# Patient Record
Sex: FEMALE | Race: WHITE | NOT HISPANIC OR LATINO
[De-identification: names, ages, dates, MRNs, and addresses within clinical notes are randomized per-mention and may not be internally consistent; named-entity substitution may affect disease eponyms.]

---

## 2017-01-12 ENCOUNTER — APPOINTMENT (OUTPATIENT)
Dept: INTERNAL MEDICINE | Facility: CLINIC | Age: 82
End: 2017-01-12

## 2017-01-12 VITALS — DIASTOLIC BLOOD PRESSURE: 68 MMHG | RESPIRATION RATE: 14 BRPM | SYSTOLIC BLOOD PRESSURE: 118 MMHG | HEART RATE: 68 BPM

## 2017-01-12 VITALS — WEIGHT: 175 LBS | HEIGHT: 68 IN | BODY MASS INDEX: 26.52 KG/M2

## 2017-01-16 ENCOUNTER — LABORATORY RESULT (OUTPATIENT)
Age: 82
End: 2017-01-16

## 2017-01-19 ENCOUNTER — APPOINTMENT (OUTPATIENT)
Dept: CARDIOLOGY | Facility: CLINIC | Age: 82
End: 2017-01-19

## 2017-01-19 VITALS
HEIGHT: 68 IN | WEIGHT: 174 LBS | OXYGEN SATURATION: 98 % | HEART RATE: 75 BPM | BODY MASS INDEX: 26.37 KG/M2 | DIASTOLIC BLOOD PRESSURE: 69 MMHG | RESPIRATION RATE: 15 BRPM | SYSTOLIC BLOOD PRESSURE: 105 MMHG

## 2017-01-19 LAB
25(OH)D3 SERPL-MCNC: 37.1 NG/ML
ALBUMIN SERPL ELPH-MCNC: 3.9 G/DL
ALP BLD-CCNC: 85 U/L
ALT SERPL-CCNC: 26 U/L
ANION GAP SERPL CALC-SCNC: 15 MMOL/L
APPEARANCE: CLEAR
AST SERPL-CCNC: 26 U/L
BASOPHILS # BLD AUTO: 0.06 K/UL
BASOPHILS NFR BLD AUTO: 0.7 %
BILIRUB SERPL-MCNC: 0.6 MG/DL
BILIRUBIN URINE: NEGATIVE
BLOOD URINE: NEGATIVE
BUN SERPL-MCNC: 33 MG/DL
CALCIUM SERPL-MCNC: 10.1 MG/DL
CHLORIDE SERPL-SCNC: 106 MMOL/L
CHOLEST SERPL-MCNC: 141 MG/DL
CHOLEST/HDLC SERPL: 2.3 RATIO
CO2 SERPL-SCNC: 24 MMOL/L
COLOR: YELLOW
CREAT SERPL-MCNC: 1.26 MG/DL
CRP SERPL HS-MCNC: 1.3 MG/L
EOSINOPHIL # BLD AUTO: 0.23 K/UL
EOSINOPHIL NFR BLD AUTO: 2.9 %
GLUCOSE BS SERPL-MCNC: 112 MG/DL
GLUCOSE QUALITATIVE U: NORMAL MG/DL
GLUCOSE SERPL-MCNC: 111 MG/DL
HBA1C MFR BLD HPLC: 6.7 %
HCT VFR BLD CALC: 34.9 %
HDLC SERPL-MCNC: 62 MG/DL
HGB BLD-MCNC: 10.3 G/DL
IMM GRANULOCYTES NFR BLD AUTO: 0.2 %
KETONES URINE: NEGATIVE
LDLC SERPL CALC-MCNC: 62 MG/DL
LEUKOCYTE ESTERASE URINE: NEGATIVE
LYMPHOCYTES # BLD AUTO: 1.54 K/UL
LYMPHOCYTES NFR BLD AUTO: 19.2 %
MAN DIFF?: NORMAL
MCHC RBC-ENTMCNC: 23.3 PG
MCHC RBC-ENTMCNC: 29.5 GM/DL
MCV RBC AUTO: 79 FL
MONOCYTES # BLD AUTO: 1.15 K/UL
MONOCYTES NFR BLD AUTO: 14.3 %
NEUTROPHILS # BLD AUTO: 5.04 K/UL
NEUTROPHILS NFR BLD AUTO: 62.7 %
NITRITE URINE: NEGATIVE
PH URINE: 6.5
PLATELET # BLD AUTO: 451 K/UL
POTASSIUM SERPL-SCNC: 4.9 MMOL/L
PROT SERPL-MCNC: 7 G/DL
PROTEIN URINE: 30 MG/DL
RBC # BLD: 4.42 M/UL
RBC # FLD: 20.1 %
SODIUM SERPL-SCNC: 145 MMOL/L
SPECIFIC GRAVITY URINE: 1.02
T4 FREE SERPL-MCNC: 1.1 NG/DL
TRIGL SERPL-MCNC: 85 MG/DL
TSH SERPL-ACNC: 2.51 UIU/ML
UROBILINOGEN URINE: 1 MG/DL
VIT B12 SERPL-MCNC: 1089 PG/ML
WBC # FLD AUTO: 8.04 K/UL

## 2017-01-20 LAB
INR PPP: 2.5 RATIO
POCT-PROTHROMBIN TIME: 28.9 SECS
QUALITY CONTROL: YES

## 2017-01-23 ENCOUNTER — LABORATORY RESULT (OUTPATIENT)
Age: 82
End: 2017-01-23

## 2017-01-24 ENCOUNTER — LABORATORY RESULT (OUTPATIENT)
Age: 82
End: 2017-01-24

## 2017-01-25 ENCOUNTER — LABORATORY RESULT (OUTPATIENT)
Age: 82
End: 2017-01-25

## 2017-01-26 ENCOUNTER — LABORATORY RESULT (OUTPATIENT)
Age: 82
End: 2017-01-26

## 2017-01-30 ENCOUNTER — APPOINTMENT (OUTPATIENT)
Dept: CARDIOLOGY | Facility: CLINIC | Age: 82
End: 2017-01-30

## 2017-01-30 VITALS
OXYGEN SATURATION: 96 % | HEART RATE: 77 BPM | SYSTOLIC BLOOD PRESSURE: 100 MMHG | DIASTOLIC BLOOD PRESSURE: 60 MMHG | RESPIRATION RATE: 16 BRPM

## 2017-01-30 LAB
INR PPP: 2.7 RATIO
POCT-PROTHROMBIN TIME: 32.3 SECS
QUALITY CONTROL: YES

## 2017-02-13 ENCOUNTER — APPOINTMENT (OUTPATIENT)
Dept: CARDIOLOGY | Facility: CLINIC | Age: 82
End: 2017-02-13

## 2017-02-13 VITALS — HEART RATE: 77 BPM | DIASTOLIC BLOOD PRESSURE: 74 MMHG | SYSTOLIC BLOOD PRESSURE: 112 MMHG | OXYGEN SATURATION: 97 %

## 2017-02-13 LAB
INR PPP: 2.6 RATIO
POCT-PROTHROMBIN TIME: 31.2 SECS
QUALITY CONTROL: YES

## 2017-02-15 ENCOUNTER — APPOINTMENT (OUTPATIENT)
Dept: HEMATOLOGY ONCOLOGY | Facility: CLINIC | Age: 82
End: 2017-02-15

## 2017-02-15 VITALS
SYSTOLIC BLOOD PRESSURE: 110 MMHG | WEIGHT: 174 LBS | BODY MASS INDEX: 26.46 KG/M2 | TEMPERATURE: 97.7 F | DIASTOLIC BLOOD PRESSURE: 62 MMHG | HEART RATE: 80 BPM

## 2017-02-15 DIAGNOSIS — Z92.89 PERSONAL HISTORY OF OTHER MEDICAL TREATMENT: ICD-10-CM

## 2017-02-15 DIAGNOSIS — Z12.4 ENCOUNTER FOR SCREENING FOR MALIGNANT NEOPLASM OF CERVIX: ICD-10-CM

## 2017-02-15 DIAGNOSIS — Z98.890 OTHER SPECIFIED POSTPROCEDURAL STATES: ICD-10-CM

## 2017-02-16 ENCOUNTER — NON-APPOINTMENT (OUTPATIENT)
Age: 82
End: 2017-02-16

## 2017-02-16 ENCOUNTER — APPOINTMENT (OUTPATIENT)
Dept: CARDIOLOGY | Facility: CLINIC | Age: 82
End: 2017-02-16

## 2017-02-16 VITALS
BODY MASS INDEX: 26.07 KG/M2 | DIASTOLIC BLOOD PRESSURE: 69 MMHG | OXYGEN SATURATION: 95 % | HEIGHT: 68 IN | HEART RATE: 68 BPM | WEIGHT: 172 LBS | RESPIRATION RATE: 16 BRPM | SYSTOLIC BLOOD PRESSURE: 110 MMHG

## 2017-02-23 ENCOUNTER — LABORATORY RESULT (OUTPATIENT)
Age: 82
End: 2017-02-23

## 2017-02-23 LAB
CREAT SERPL-MCNC: 1.27 MG/DL
FERRITIN SERPL-MCNC: 39.6 NG/ML
FOLATE SERPL-MCNC: 15.4 NG/ML
HAPTOGLOB SERPL-MCNC: <20 MG/DL
IRON SATN MFR SERPL: 11 %
IRON SERPL-MCNC: 40 UG/DL
RBC # BLD: 4.58 M/UL
RETICS # AUTO: 2.1 %
RETICS AGGREG/RBC NFR: 94.1 K/UL
TIBC SERPL-MCNC: 368 UG/DL
UIBC SERPL-MCNC: 328 UG/DL
VIT B12 SERPL-MCNC: 1228 PG/ML

## 2017-02-24 LAB
BASOPHILS # BLD AUTO: 0.05 K/UL
BASOPHILS NFR BLD AUTO: 0.6 %
DEPRECATED KAPPA LC FREE/LAMBDA SER: 1.42 RATIO
DEPRECATED KAPPA LC FREE/LAMBDA SER: 1.42 RATIO
EOSINOPHIL # BLD AUTO: 0.16 K/UL
EOSINOPHIL NFR BLD AUTO: 1.9 %
HCT VFR BLD CALC: 35.9 %
HCYS SERPL-MCNC: 13.5 UMOL/L
HGB BLD-MCNC: 10.8 G/DL
IGA SER QL IEP: 350 MG/DL
IGG SER QL IEP: 1290 MG/DL
IGM SER QL IEP: 118 MG/DL
IMM GRANULOCYTES NFR BLD AUTO: 0.2 %
KAPPA LC CSF-MCNC: 2.78 MG/DL
KAPPA LC CSF-MCNC: 2.78 MG/DL
KAPPA LC SERPL-MCNC: 3.94 MG/DL
KAPPA LC SERPL-MCNC: 3.94 MG/DL
LYMPHOCYTES # BLD AUTO: 1.03 K/UL
LYMPHOCYTES NFR BLD AUTO: 12.2 %
MAN DIFF?: NORMAL
MCHC RBC-ENTMCNC: 23.6 PG
MCHC RBC-ENTMCNC: 30.1 GM/DL
MCV RBC AUTO: 78.4 FL
MONOCYTES # BLD AUTO: 0.9 K/UL
MONOCYTES NFR BLD AUTO: 10.7 %
NEUTROPHILS # BLD AUTO: 6.26 K/UL
NEUTROPHILS NFR BLD AUTO: 74.4 %
PLATELET # BLD AUTO: 437 K/UL
RBC # BLD: 4.58 M/UL
RBC # FLD: 23.1 %
WBC # FLD AUTO: 8.42 K/UL

## 2017-02-26 LAB — KAPPA LC 24H UR QL: NORMAL

## 2017-02-27 LAB
ALBUMIN MFR SERPL ELPH: 53.9 %
ALBUMIN SERPL-MCNC: 3.8 G/DL
ALBUMIN/GLOB SERPL: 1.2 RATIO
ALPHA1 GLOB MFR SERPL ELPH: 5.4 %
ALPHA1 GLOB SERPL ELPH-MCNC: 0.4 G/DL
ALPHA2 GLOB MFR SERPL ELPH: 8.8 %
ALPHA2 GLOB SERPL ELPH-MCNC: 0.6 G/DL
B-GLOBULIN MFR SERPL ELPH: 14.1 %
B-GLOBULIN SERPL ELPH-MCNC: 1 G/DL
GAMMA GLOB FLD ELPH-MCNC: 1.2 G/DL
GAMMA GLOB MFR SERPL ELPH: 17.8 %
INTERPRETATION SERPL IEP-IMP: NORMAL
M PROTEIN MFR SERPL ELPH: NORMAL %
M PROTEIN SPEC IFE-MCNC: NORMAL
MONOCLON BAND OBS SERPL: NORMAL G/DL
PROT SERPL-MCNC: 7 G/DL
PROT SERPL-MCNC: 7 G/DL

## 2017-03-02 ENCOUNTER — APPOINTMENT (OUTPATIENT)
Dept: HEMATOLOGY ONCOLOGY | Facility: CLINIC | Age: 82
End: 2017-03-02

## 2017-03-02 VITALS
DIASTOLIC BLOOD PRESSURE: 70 MMHG | SYSTOLIC BLOOD PRESSURE: 122 MMHG | HEART RATE: 76 BPM | BODY MASS INDEX: 26.46 KG/M2 | WEIGHT: 174 LBS | OXYGEN SATURATION: 96 % | TEMPERATURE: 97.4 F

## 2017-03-02 LAB — METHYLMALONATE SERPL-SCNC: 251 NMOL/L

## 2017-03-02 RX ORDER — CLINDAMYCIN HYDROCHLORIDE 150 MG/1
150 CAPSULE ORAL
Qty: 28 | Refills: 0 | Status: COMPLETED | COMMUNITY
Start: 2016-08-22

## 2017-03-06 ENCOUNTER — APPOINTMENT (OUTPATIENT)
Dept: CARDIOLOGY | Facility: CLINIC | Age: 82
End: 2017-03-06

## 2017-03-06 VITALS — SYSTOLIC BLOOD PRESSURE: 118 MMHG | HEART RATE: 68 BPM | OXYGEN SATURATION: 98 % | DIASTOLIC BLOOD PRESSURE: 74 MMHG

## 2017-03-06 LAB
INR PPP: 2.6 RATIO
POCT-PROTHROMBIN TIME: 31.7 SECS
QUALITY CONTROL: YES

## 2017-03-23 ENCOUNTER — APPOINTMENT (OUTPATIENT)
Dept: CARDIOLOGY | Facility: CLINIC | Age: 82
End: 2017-03-23

## 2017-03-23 ENCOUNTER — NON-APPOINTMENT (OUTPATIENT)
Age: 82
End: 2017-03-23

## 2017-03-23 VITALS
HEART RATE: 74 BPM | WEIGHT: 172 LBS | BODY MASS INDEX: 26.07 KG/M2 | RESPIRATION RATE: 16 BRPM | SYSTOLIC BLOOD PRESSURE: 132 MMHG | DIASTOLIC BLOOD PRESSURE: 69 MMHG | HEIGHT: 68 IN | OXYGEN SATURATION: 96 %

## 2017-03-24 ENCOUNTER — RX RENEWAL (OUTPATIENT)
Age: 82
End: 2017-03-24

## 2017-03-24 LAB
INR PPP: 2.6 RATIO
POCT-PROTHROMBIN TIME: 31.2 SECS
QUALITY CONTROL: YES

## 2017-04-02 ENCOUNTER — EMERGENCY (EMERGENCY)
Facility: HOSPITAL | Age: 82
LOS: 1 days | Discharge: HOME | End: 2017-04-02
Admitting: EMERGENCY MEDICINE
Payer: MEDICARE

## 2017-04-02 DIAGNOSIS — Z86.69 PERSONAL HISTORY OF OTHER DISEASES OF THE NERVOUS SYSTEM AND SENSE ORGANS: Chronic | ICD-10-CM

## 2017-04-02 DIAGNOSIS — Z79.82 LONG TERM (CURRENT) USE OF ASPIRIN: ICD-10-CM

## 2017-04-02 DIAGNOSIS — M84.372A STRESS FRACTURE, LEFT ANKLE, INITIAL ENCOUNTER FOR FRACTURE: Chronic | ICD-10-CM

## 2017-04-02 DIAGNOSIS — Z88.0 ALLERGY STATUS TO PENICILLIN: ICD-10-CM

## 2017-04-02 DIAGNOSIS — S49.91XA UNSPECIFIED INJURY OF RIGHT SHOULDER AND UPPER ARM, INITIAL ENCOUNTER: ICD-10-CM
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
## 2017-04-02 DIAGNOSIS — Z95.4 PRESENCE OF OTHER HEART-VALVE REPLACEMENT: Chronic | ICD-10-CM

## 2017-04-02 DIAGNOSIS — S09.90XA UNSPECIFIED INJURY OF HEAD, INITIAL ENCOUNTER: ICD-10-CM

## 2017-04-02 DIAGNOSIS — Z79.01 LONG TERM (CURRENT) USE OF ANTICOAGULANTS: ICD-10-CM

## 2017-04-02 DIAGNOSIS — R51 HEADACHE: ICD-10-CM

## 2017-04-02 PROCEDURE — 73030 X-RAY EXAM OF SHOULDER: CPT | Mod: 26,RT

## 2017-04-02 PROCEDURE — 70450 CT HEAD/BRAIN W/O DYE: CPT | Mod: 26

## 2017-04-02 PROCEDURE — 99284 EMERGENCY DEPT VISIT MOD MDM: CPT | Mod: 25

## 2017-04-02 PROCEDURE — 70450 CT HEAD/BRAIN W/O DYE: CPT

## 2017-04-02 PROCEDURE — 73030 X-RAY EXAM OF SHOULDER: CPT

## 2017-04-02 PROCEDURE — 99284 EMERGENCY DEPT VISIT MOD MDM: CPT

## 2017-04-10 ENCOUNTER — APPOINTMENT (OUTPATIENT)
Dept: CARDIOLOGY | Facility: CLINIC | Age: 82
End: 2017-04-10

## 2017-04-10 VITALS — HEART RATE: 73 BPM | OXYGEN SATURATION: 93 %

## 2017-04-10 LAB
INR PPP: 2.7 RATIO
POCT-PROTHROMBIN TIME: 32.6 SECS
QUALITY CONTROL: YES

## 2017-04-13 ENCOUNTER — RX RENEWAL (OUTPATIENT)
Age: 82
End: 2017-04-13

## 2017-04-13 ENCOUNTER — NON-APPOINTMENT (OUTPATIENT)
Age: 82
End: 2017-04-13

## 2017-04-13 ENCOUNTER — APPOINTMENT (OUTPATIENT)
Dept: INTERNAL MEDICINE | Facility: CLINIC | Age: 82
End: 2017-04-13

## 2017-04-13 VITALS
SYSTOLIC BLOOD PRESSURE: 128 MMHG | BODY MASS INDEX: 26.37 KG/M2 | RESPIRATION RATE: 14 BRPM | HEART RATE: 72 BPM | WEIGHT: 174 LBS | HEIGHT: 68 IN | DIASTOLIC BLOOD PRESSURE: 72 MMHG

## 2017-05-01 ENCOUNTER — LABORATORY RESULT (OUTPATIENT)
Age: 82
End: 2017-05-01

## 2017-05-01 LAB
CALCIUM SERPL-MCNC: 10.2 MG/DL
CREAT SERPL-MCNC: 1.2 MG/DL
FERRITIN SERPL-MCNC: 51.9 NG/ML
IRON SATN MFR SERPL: 43 %
IRON SERPL-MCNC: 141 UG/DL
LDH SERPL-CCNC: 279 U/L
RBC # BLD: 4.48 M/UL
RETICS # AUTO: 1.6 %
RETICS AGGREG/RBC NFR: 73.5 K/UL
TIBC SERPL-MCNC: 327 UG/DL
UIBC SERPL-MCNC: 186 UG/DL

## 2017-05-02 LAB
BASOPHILS # BLD AUTO: 0.03 K/UL
BASOPHILS NFR BLD AUTO: 0.4 %
DEPRECATED KAPPA LC FREE/LAMBDA SER: 1.68 RATIO
DEPRECATED KAPPA LC FREE/LAMBDA SER: 1.68 RATIO
EOSINOPHIL # BLD AUTO: 0.14 K/UL
EOSINOPHIL NFR BLD AUTO: 1.8 %
HCT VFR BLD CALC: 38.3 %
HGB BLD-MCNC: 12.1 G/DL
IGA SER QL IEP: 369 MG/DL
IGG SER QL IEP: 1290 MG/DL
IGM SER QL IEP: 120 MG/DL
IMM GRANULOCYTES NFR BLD AUTO: 0.4 %
KAPPA LC CSF-MCNC: 2.3 MG/DL
KAPPA LC CSF-MCNC: 2.3 MG/DL
KAPPA LC SERPL-MCNC: 3.87 MG/DL
KAPPA LC SERPL-MCNC: 3.87 MG/DL
LYMPHOCYTES # BLD AUTO: 0.87 K/UL
LYMPHOCYTES NFR BLD AUTO: 11.1 %
MAN DIFF?: NORMAL
MCHC RBC-ENTMCNC: 27 PG
MCHC RBC-ENTMCNC: 31.6 GM/DL
MCV RBC AUTO: 85.5 FL
MONOCYTES # BLD AUTO: 0.77 K/UL
MONOCYTES NFR BLD AUTO: 9.8 %
NEUTROPHILS # BLD AUTO: 6.01 K/UL
NEUTROPHILS NFR BLD AUTO: 76.5 %
PLATELET # BLD AUTO: 225 K/UL
RBC # BLD: 4.48 M/UL
RBC # FLD: 26.4 %
WBC # FLD AUTO: 7.85 K/UL

## 2017-05-09 ENCOUNTER — APPOINTMENT (OUTPATIENT)
Dept: HEMATOLOGY ONCOLOGY | Facility: CLINIC | Age: 82
End: 2017-05-09

## 2017-05-09 VITALS
HEART RATE: 60 BPM | SYSTOLIC BLOOD PRESSURE: 132 MMHG | DIASTOLIC BLOOD PRESSURE: 60 MMHG | WEIGHT: 172 LBS | BODY MASS INDEX: 26.15 KG/M2 | TEMPERATURE: 97.7 F

## 2017-05-09 RX ORDER — DILTIAZEM HYDROCHLORIDE 30 MG/1
30 TABLET ORAL TWICE DAILY
Refills: 0 | Status: DISCONTINUED | COMMUNITY
End: 2017-05-09

## 2017-05-11 ENCOUNTER — APPOINTMENT (OUTPATIENT)
Dept: CARDIOLOGY | Facility: CLINIC | Age: 82
End: 2017-05-11

## 2017-05-11 VITALS — HEART RATE: 73 BPM | DIASTOLIC BLOOD PRESSURE: 76 MMHG | OXYGEN SATURATION: 97 % | SYSTOLIC BLOOD PRESSURE: 119 MMHG

## 2017-05-11 LAB
INR PPP: 3 RATIO
POCT-PROTHROMBIN TIME: 35.7 SECS
QUALITY CONTROL: YES

## 2017-05-25 ENCOUNTER — NON-APPOINTMENT (OUTPATIENT)
Age: 82
End: 2017-05-25

## 2017-05-25 ENCOUNTER — APPOINTMENT (OUTPATIENT)
Dept: CARDIOLOGY | Facility: CLINIC | Age: 82
End: 2017-05-25

## 2017-05-25 VITALS
BODY MASS INDEX: 26.07 KG/M2 | HEART RATE: 73 BPM | WEIGHT: 172 LBS | HEIGHT: 68 IN | RESPIRATION RATE: 16 BRPM | DIASTOLIC BLOOD PRESSURE: 72 MMHG | OXYGEN SATURATION: 98 % | SYSTOLIC BLOOD PRESSURE: 122 MMHG

## 2017-06-08 ENCOUNTER — APPOINTMENT (OUTPATIENT)
Dept: CARDIOLOGY | Facility: CLINIC | Age: 82
End: 2017-06-08

## 2017-06-08 VITALS — DIASTOLIC BLOOD PRESSURE: 84 MMHG | HEART RATE: 64 BPM | SYSTOLIC BLOOD PRESSURE: 130 MMHG | OXYGEN SATURATION: 97 %

## 2017-06-08 LAB
INR PPP: 3.2 RATIO
POCT-PROTHROMBIN TIME: 38.9 SECS
QUALITY CONTROL: YES

## 2017-06-16 ENCOUNTER — APPOINTMENT (OUTPATIENT)
Dept: INTERNAL MEDICINE | Facility: CLINIC | Age: 82
End: 2017-06-16

## 2017-06-16 VITALS
HEART RATE: 68 BPM | HEIGHT: 68 IN | SYSTOLIC BLOOD PRESSURE: 128 MMHG | RESPIRATION RATE: 14 BRPM | BODY MASS INDEX: 26.37 KG/M2 | DIASTOLIC BLOOD PRESSURE: 68 MMHG | WEIGHT: 174 LBS

## 2017-06-28 ENCOUNTER — RX RENEWAL (OUTPATIENT)
Age: 82
End: 2017-06-28

## 2017-06-29 ENCOUNTER — APPOINTMENT (OUTPATIENT)
Dept: CARDIOLOGY | Facility: CLINIC | Age: 82
End: 2017-06-29

## 2017-06-29 ENCOUNTER — RX RENEWAL (OUTPATIENT)
Age: 82
End: 2017-06-29

## 2017-07-05 LAB
INR PPP: 2.8 RATIO
POCT-PROTHROMBIN TIME: 28.8 SECS
QUALITY CONTROL: YES

## 2017-07-20 ENCOUNTER — APPOINTMENT (OUTPATIENT)
Dept: CARDIOLOGY | Facility: CLINIC | Age: 82
End: 2017-07-20

## 2017-07-20 VITALS — SYSTOLIC BLOOD PRESSURE: 117 MMHG | HEART RATE: 75 BPM | OXYGEN SATURATION: 97 % | DIASTOLIC BLOOD PRESSURE: 78 MMHG

## 2017-07-20 LAB
INR PPP: 2.9 RATIO
POCT-PROTHROMBIN TIME: 34.3 SECS
QUALITY CONTROL: YES

## 2017-07-27 ENCOUNTER — RX RENEWAL (OUTPATIENT)
Age: 82
End: 2017-07-27

## 2017-08-01 ENCOUNTER — APPOINTMENT (OUTPATIENT)
Dept: INTERNAL MEDICINE | Facility: CLINIC | Age: 82
End: 2017-08-01
Payer: MEDICARE

## 2017-08-01 VITALS
HEIGHT: 68 IN | RESPIRATION RATE: 15 BRPM | HEART RATE: 68 BPM | DIASTOLIC BLOOD PRESSURE: 76 MMHG | BODY MASS INDEX: 26.22 KG/M2 | SYSTOLIC BLOOD PRESSURE: 120 MMHG | TEMPERATURE: 98.4 F | WEIGHT: 173 LBS

## 2017-08-01 PROCEDURE — 99215 OFFICE O/P EST HI 40 MIN: CPT

## 2017-08-01 RX ORDER — CIPROFLOXACIN HYDROCHLORIDE 500 MG/1
500 TABLET, FILM COATED ORAL
Qty: 14 | Refills: 0 | Status: DISCONTINUED | COMMUNITY
Start: 2017-06-30 | End: 2017-08-01

## 2017-08-01 RX ORDER — METRONIDAZOLE 250 MG/1
250 TABLET ORAL EVERY 6 HOURS
Qty: 28 | Refills: 0 | Status: DISCONTINUED | COMMUNITY
Start: 2017-06-30 | End: 2017-08-01

## 2017-08-07 ENCOUNTER — MESSAGE (OUTPATIENT)
Age: 82
End: 2017-08-07

## 2017-08-08 ENCOUNTER — EMERGENCY (EMERGENCY)
Facility: HOSPITAL | Age: 82
LOS: 1 days | Discharge: HOME | End: 2017-08-08
Admitting: EMERGENCY MEDICINE
Payer: MEDICARE

## 2017-08-08 ENCOUNTER — NON-APPOINTMENT (OUTPATIENT)
Age: 82
End: 2017-08-08

## 2017-08-08 ENCOUNTER — APPOINTMENT (OUTPATIENT)
Dept: CARDIOLOGY | Facility: CLINIC | Age: 82
End: 2017-08-08
Payer: MEDICARE

## 2017-08-08 VITALS
DIASTOLIC BLOOD PRESSURE: 83 MMHG | OXYGEN SATURATION: 97 % | RESPIRATION RATE: 15 BRPM | HEIGHT: 68 IN | BODY MASS INDEX: 25.01 KG/M2 | SYSTOLIC BLOOD PRESSURE: 139 MMHG | WEIGHT: 165 LBS | HEART RATE: 87 BPM

## 2017-08-08 DIAGNOSIS — Z86.69 PERSONAL HISTORY OF OTHER DISEASES OF THE NERVOUS SYSTEM AND SENSE ORGANS: Chronic | ICD-10-CM

## 2017-08-08 DIAGNOSIS — Z88.0 ALLERGY STATUS TO PENICILLIN: ICD-10-CM

## 2017-08-08 DIAGNOSIS — I50.9 HEART FAILURE, UNSPECIFIED: ICD-10-CM

## 2017-08-08 DIAGNOSIS — R07.9 CHEST PAIN, UNSPECIFIED: ICD-10-CM

## 2017-08-08 DIAGNOSIS — R60.0 LOCALIZED EDEMA: ICD-10-CM

## 2017-08-08 DIAGNOSIS — Z79.01 LONG TERM (CURRENT) USE OF ANTICOAGULANTS: ICD-10-CM

## 2017-08-08 DIAGNOSIS — Z95.4 PRESENCE OF OTHER HEART-VALVE REPLACEMENT: Chronic | ICD-10-CM

## 2017-08-08 DIAGNOSIS — Z79.899 OTHER LONG TERM (CURRENT) DRUG THERAPY: ICD-10-CM

## 2017-08-08 DIAGNOSIS — I24.9 ACUTE ISCHEMIC HEART DISEASE, UNSPECIFIED: ICD-10-CM

## 2017-08-08 DIAGNOSIS — I25.10 ATHEROSCLEROTIC HEART DISEASE OF NATIVE CORONARY ARTERY WITHOUT ANGINA PECTORIS: ICD-10-CM

## 2017-08-08 DIAGNOSIS — R94.31 ABNORMAL ELECTROCARDIOGRAM [ECG] [EKG]: ICD-10-CM

## 2017-08-08 DIAGNOSIS — Z79.82 LONG TERM (CURRENT) USE OF ASPIRIN: ICD-10-CM

## 2017-08-08 DIAGNOSIS — R07.89 OTHER CHEST PAIN: ICD-10-CM

## 2017-08-08 DIAGNOSIS — M84.372A STRESS FRACTURE, LEFT ANKLE, INITIAL ENCOUNTER FOR FRACTURE: Chronic | ICD-10-CM

## 2017-08-08 PROCEDURE — 99284 EMERGENCY DEPT VISIT MOD MDM: CPT | Mod: 25

## 2017-08-08 PROCEDURE — 71010: CPT | Mod: 26

## 2017-08-08 PROCEDURE — 71045 X-RAY EXAM CHEST 1 VIEW: CPT

## 2017-08-08 PROCEDURE — 93005 ELECTROCARDIOGRAM TRACING: CPT

## 2017-08-08 PROCEDURE — 83880 ASSAY OF NATRIURETIC PEPTIDE: CPT

## 2017-08-08 PROCEDURE — 82550 ASSAY OF CK (CPK): CPT

## 2017-08-08 PROCEDURE — 36415 COLL VENOUS BLD VENIPUNCTURE: CPT

## 2017-08-08 PROCEDURE — 93010 ELECTROCARDIOGRAM REPORT: CPT

## 2017-08-08 PROCEDURE — 84484 ASSAY OF TROPONIN QUANT: CPT

## 2017-08-08 PROCEDURE — 93306 TTE W/DOPPLER COMPLETE: CPT

## 2017-08-08 PROCEDURE — 85027 COMPLETE CBC AUTOMATED: CPT

## 2017-08-08 PROCEDURE — 93000 ELECTROCARDIOGRAM COMPLETE: CPT

## 2017-08-08 PROCEDURE — 80053 COMPREHEN METABOLIC PANEL: CPT

## 2017-08-08 PROCEDURE — 85730 THROMBOPLASTIN TIME PARTIAL: CPT

## 2017-08-08 PROCEDURE — 99215 OFFICE O/P EST HI 40 MIN: CPT

## 2017-08-08 PROCEDURE — 85610 PROTHROMBIN TIME: CPT

## 2017-08-08 PROCEDURE — 99285 EMERGENCY DEPT VISIT HI MDM: CPT

## 2017-08-17 ENCOUNTER — APPOINTMENT (OUTPATIENT)
Dept: CARDIOLOGY | Facility: CLINIC | Age: 82
End: 2017-08-17
Payer: MEDICARE

## 2017-08-17 ENCOUNTER — NON-APPOINTMENT (OUTPATIENT)
Age: 82
End: 2017-08-17

## 2017-08-17 VITALS
RESPIRATION RATE: 16 BRPM | BODY MASS INDEX: 25.16 KG/M2 | WEIGHT: 166 LBS | HEIGHT: 68 IN | HEART RATE: 76 BPM | SYSTOLIC BLOOD PRESSURE: 128 MMHG | OXYGEN SATURATION: 99 % | DIASTOLIC BLOOD PRESSURE: 79 MMHG

## 2017-08-17 LAB
INR PPP: 4 RATIO
POCT-PROTHROMBIN TIME: 47.8 SECS
QUALITY CONTROL: YES

## 2017-08-17 PROCEDURE — 85610 PROTHROMBIN TIME: CPT | Mod: QW

## 2017-08-17 PROCEDURE — 93000 ELECTROCARDIOGRAM COMPLETE: CPT

## 2017-08-17 PROCEDURE — 99214 OFFICE O/P EST MOD 30 MIN: CPT

## 2017-08-21 ENCOUNTER — APPOINTMENT (OUTPATIENT)
Dept: CARDIOLOGY | Facility: CLINIC | Age: 82
End: 2017-08-21
Payer: MEDICARE

## 2017-08-21 VITALS — HEART RATE: 67 BPM | OXYGEN SATURATION: 96 %

## 2017-08-21 LAB
INR PPP: 3.3 RATIO
POCT-PROTHROMBIN TIME: 39.4 SECS
QUALITY CONTROL: YES

## 2017-08-21 PROCEDURE — 85610 PROTHROMBIN TIME: CPT | Mod: QW

## 2017-08-21 PROCEDURE — 99211 OFF/OP EST MAY X REQ PHY/QHP: CPT

## 2017-09-05 ENCOUNTER — APPOINTMENT (OUTPATIENT)
Dept: CARDIOLOGY | Facility: CLINIC | Age: 82
End: 2017-09-05
Payer: MEDICARE

## 2017-09-05 VITALS — DIASTOLIC BLOOD PRESSURE: 79 MMHG | OXYGEN SATURATION: 99 % | HEART RATE: 64 BPM | SYSTOLIC BLOOD PRESSURE: 125 MMHG

## 2017-09-05 LAB
INR PPP: 2.6 RATIO
POCT-PROTHROMBIN TIME: 29.9 SECS
QUALITY CONTROL: YES

## 2017-09-05 PROCEDURE — 85610 PROTHROMBIN TIME: CPT | Mod: QW

## 2017-09-05 PROCEDURE — 99211 OFF/OP EST MAY X REQ PHY/QHP: CPT

## 2017-09-08 ENCOUNTER — EMERGENCY (EMERGENCY)
Facility: HOSPITAL | Age: 82
LOS: 1 days | Discharge: HOME | End: 2017-09-08
Admitting: EMERGENCY MEDICINE
Payer: MEDICARE

## 2017-09-08 DIAGNOSIS — M84.372A STRESS FRACTURE, LEFT ANKLE, INITIAL ENCOUNTER FOR FRACTURE: Chronic | ICD-10-CM

## 2017-09-08 DIAGNOSIS — Z79.82 LONG TERM (CURRENT) USE OF ASPIRIN: ICD-10-CM

## 2017-09-08 DIAGNOSIS — Z95.2 PRESENCE OF PROSTHETIC HEART VALVE: ICD-10-CM

## 2017-09-08 DIAGNOSIS — Z79.899 OTHER LONG TERM (CURRENT) DRUG THERAPY: ICD-10-CM

## 2017-09-08 DIAGNOSIS — Z86.69 PERSONAL HISTORY OF OTHER DISEASES OF THE NERVOUS SYSTEM AND SENSE ORGANS: Chronic | ICD-10-CM

## 2017-09-08 DIAGNOSIS — K57.32 DIVERTICULITIS OF LARGE INTESTINE WITHOUT PERFORATION OR ABSCESS WITHOUT BLEEDING: ICD-10-CM

## 2017-09-08 DIAGNOSIS — R10.84 GENERALIZED ABDOMINAL PAIN: ICD-10-CM

## 2017-09-08 DIAGNOSIS — Z79.01 LONG TERM (CURRENT) USE OF ANTICOAGULANTS: ICD-10-CM

## 2017-09-08 DIAGNOSIS — Z95.4 PRESENCE OF OTHER HEART-VALVE REPLACEMENT: Chronic | ICD-10-CM

## 2017-09-08 DIAGNOSIS — Z88.0 ALLERGY STATUS TO PENICILLIN: ICD-10-CM

## 2017-09-08 PROCEDURE — 87040 BLOOD CULTURE FOR BACTERIA: CPT

## 2017-09-08 PROCEDURE — 85027 COMPLETE CBC AUTOMATED: CPT

## 2017-09-08 PROCEDURE — 99284 EMERGENCY DEPT VISIT MOD MDM: CPT | Mod: 25

## 2017-09-08 PROCEDURE — 71045 X-RAY EXAM CHEST 1 VIEW: CPT

## 2017-09-08 PROCEDURE — 74177 CT ABD & PELVIS W/CONTRAST: CPT

## 2017-09-08 PROCEDURE — 99284 EMERGENCY DEPT VISIT MOD MDM: CPT

## 2017-09-08 PROCEDURE — 93005 ELECTROCARDIOGRAM TRACING: CPT

## 2017-09-08 PROCEDURE — 83605 ASSAY OF LACTIC ACID: CPT

## 2017-09-08 PROCEDURE — 85730 THROMBOPLASTIN TIME PARTIAL: CPT

## 2017-09-08 PROCEDURE — 93010 ELECTROCARDIOGRAM REPORT: CPT

## 2017-09-08 PROCEDURE — 85610 PROTHROMBIN TIME: CPT

## 2017-09-08 PROCEDURE — 80048 BASIC METABOLIC PNL TOTAL CA: CPT

## 2017-09-08 PROCEDURE — 71010: CPT | Mod: 26

## 2017-09-08 PROCEDURE — 36415 COLL VENOUS BLD VENIPUNCTURE: CPT

## 2017-09-08 PROCEDURE — 81003 URINALYSIS AUTO W/O SCOPE: CPT

## 2017-09-08 PROCEDURE — 74177 CT ABD & PELVIS W/CONTRAST: CPT | Mod: 26

## 2017-09-12 ENCOUNTER — APPOINTMENT (OUTPATIENT)
Dept: CARDIOLOGY | Facility: CLINIC | Age: 82
End: 2017-09-12
Payer: MEDICARE

## 2017-09-12 VITALS — OXYGEN SATURATION: 98 % | HEART RATE: 67 BPM

## 2017-09-12 LAB
INR PPP: 6.8 RATIO
POCT-PROTHROMBIN TIME: 81.7 SECS
QUALITY CONTROL: YES

## 2017-09-12 PROCEDURE — 85610 PROTHROMBIN TIME: CPT | Mod: QW

## 2017-09-12 PROCEDURE — 99211 OFF/OP EST MAY X REQ PHY/QHP: CPT

## 2017-09-13 ENCOUNTER — OUTPATIENT (OUTPATIENT)
Dept: OUTPATIENT SERVICES | Facility: HOSPITAL | Age: 82
LOS: 1 days | End: 2017-09-13
Payer: MEDICARE

## 2017-09-13 ENCOUNTER — APPOINTMENT (OUTPATIENT)
Dept: INTERNAL MEDICINE | Facility: CLINIC | Age: 82
End: 2017-09-13
Payer: MEDICARE

## 2017-09-13 ENCOUNTER — APPOINTMENT (OUTPATIENT)
Dept: MRI IMAGING | Facility: HOSPITAL | Age: 82
End: 2017-09-13
Payer: MEDICARE

## 2017-09-13 VITALS
DIASTOLIC BLOOD PRESSURE: 80 MMHG | HEIGHT: 68 IN | HEART RATE: 68 BPM | SYSTOLIC BLOOD PRESSURE: 128 MMHG | BODY MASS INDEX: 24.25 KG/M2 | RESPIRATION RATE: 15 BRPM | WEIGHT: 160 LBS

## 2017-09-13 DIAGNOSIS — M84.372A STRESS FRACTURE, LEFT ANKLE, INITIAL ENCOUNTER FOR FRACTURE: Chronic | ICD-10-CM

## 2017-09-13 DIAGNOSIS — K86.89 OTHER SPECIFIED DISEASES OF PANCREAS: ICD-10-CM

## 2017-09-13 DIAGNOSIS — Z95.4 PRESENCE OF OTHER HEART-VALVE REPLACEMENT: Chronic | ICD-10-CM

## 2017-09-13 DIAGNOSIS — N28.1 CYST OF KIDNEY, ACQUIRED: ICD-10-CM

## 2017-09-13 DIAGNOSIS — K57.30 DIVERTICULOSIS OF LARGE INTESTINE WITHOUT PERFORATION OR ABSCESS WITHOUT BLEEDING: ICD-10-CM

## 2017-09-13 DIAGNOSIS — Z86.69 PERSONAL HISTORY OF OTHER DISEASES OF THE NERVOUS SYSTEM AND SENSE ORGANS: Chronic | ICD-10-CM

## 2017-09-13 DIAGNOSIS — Z00.8 ENCOUNTER FOR OTHER GENERAL EXAMINATION: ICD-10-CM

## 2017-09-13 PROCEDURE — 76376 3D RENDER W/INTRP POSTPROCES: CPT

## 2017-09-13 PROCEDURE — 74181 MRI ABDOMEN W/O CONTRAST: CPT

## 2017-09-13 PROCEDURE — 99215 OFFICE O/P EST HI 40 MIN: CPT

## 2017-09-13 PROCEDURE — 74181 MRI ABDOMEN W/O CONTRAST: CPT | Mod: 26

## 2017-09-15 ENCOUNTER — APPOINTMENT (OUTPATIENT)
Dept: CARDIOLOGY | Facility: CLINIC | Age: 82
End: 2017-09-15
Payer: MEDICARE

## 2017-09-15 LAB
INR PPP: 4.3 RATIO
POCT-PROTHROMBIN TIME: 51.9 SECS
QUALITY CONTROL: YES

## 2017-09-15 PROCEDURE — 85610 PROTHROMBIN TIME: CPT | Mod: QW

## 2017-09-15 PROCEDURE — 99211 OFF/OP EST MAY X REQ PHY/QHP: CPT

## 2017-09-19 ENCOUNTER — APPOINTMENT (OUTPATIENT)
Dept: CARDIOLOGY | Facility: CLINIC | Age: 82
End: 2017-09-19
Payer: MEDICARE

## 2017-09-19 VITALS — DIASTOLIC BLOOD PRESSURE: 83 MMHG | HEART RATE: 69 BPM | SYSTOLIC BLOOD PRESSURE: 129 MMHG | OXYGEN SATURATION: 97 %

## 2017-09-19 LAB
INR PPP: 2.1 RATIO
POCT-PROTHROMBIN TIME: 25.6 SECS
QUALITY CONTROL: YES

## 2017-09-19 PROCEDURE — 85610 PROTHROMBIN TIME: CPT | Mod: QW

## 2017-09-19 PROCEDURE — 99211 OFF/OP EST MAY X REQ PHY/QHP: CPT

## 2017-09-25 ENCOUNTER — RX RENEWAL (OUTPATIENT)
Age: 82
End: 2017-09-25

## 2017-09-26 ENCOUNTER — APPOINTMENT (OUTPATIENT)
Dept: INTERNAL MEDICINE | Facility: CLINIC | Age: 82
End: 2017-09-26
Payer: MEDICARE

## 2017-09-26 ENCOUNTER — APPOINTMENT (OUTPATIENT)
Dept: CARDIOLOGY | Facility: CLINIC | Age: 82
End: 2017-09-26
Payer: MEDICARE

## 2017-09-26 VITALS
RESPIRATION RATE: 14 BRPM | HEART RATE: 76 BPM | SYSTOLIC BLOOD PRESSURE: 130 MMHG | BODY MASS INDEX: 24.55 KG/M2 | HEIGHT: 68 IN | DIASTOLIC BLOOD PRESSURE: 84 MMHG | WEIGHT: 162 LBS

## 2017-09-26 VITALS — OXYGEN SATURATION: 96 % | HEART RATE: 74 BPM

## 2017-09-26 DIAGNOSIS — K57.92 DIVERTICULITIS OF INTESTINE, PART UNSPECIFIED, W/OUT PERFORATION OR ABSCESS W/OUT BLEEDING: ICD-10-CM

## 2017-09-26 DIAGNOSIS — Z23 ENCOUNTER FOR IMMUNIZATION: ICD-10-CM

## 2017-09-26 LAB
INR PPP: 1.5 RATIO
POCT-PROTHROMBIN TIME: 17.9 SECS
QUALITY CONTROL: YES

## 2017-09-26 PROCEDURE — G0008: CPT

## 2017-09-26 PROCEDURE — 99211 OFF/OP EST MAY X REQ PHY/QHP: CPT

## 2017-09-26 PROCEDURE — 99215 OFFICE O/P EST HI 40 MIN: CPT | Mod: 25

## 2017-09-26 PROCEDURE — 90686 IIV4 VACC NO PRSV 0.5 ML IM: CPT

## 2017-09-26 PROCEDURE — 85610 PROTHROMBIN TIME: CPT | Mod: QW

## 2017-10-03 ENCOUNTER — APPOINTMENT (OUTPATIENT)
Dept: CARDIOLOGY | Facility: CLINIC | Age: 82
End: 2017-10-03
Payer: MEDICARE

## 2017-10-03 VITALS — OXYGEN SATURATION: 97 % | DIASTOLIC BLOOD PRESSURE: 76 MMHG | SYSTOLIC BLOOD PRESSURE: 119 MMHG | HEART RATE: 68 BPM

## 2017-10-03 LAB
INR PPP: 1.9 RATIO
POCT-PROTHROMBIN TIME: 22.7 SECS
QUALITY CONTROL: YES

## 2017-10-03 PROCEDURE — 85610 PROTHROMBIN TIME: CPT | Mod: QW

## 2017-10-03 PROCEDURE — 99211 OFF/OP EST MAY X REQ PHY/QHP: CPT

## 2017-10-10 ENCOUNTER — APPOINTMENT (OUTPATIENT)
Dept: CARDIOLOGY | Facility: CLINIC | Age: 82
End: 2017-10-10
Payer: MEDICARE

## 2017-10-10 VITALS — OXYGEN SATURATION: 96 % | DIASTOLIC BLOOD PRESSURE: 79 MMHG | HEART RATE: 81 BPM | SYSTOLIC BLOOD PRESSURE: 132 MMHG

## 2017-10-10 LAB
INR PPP: 2.5 RATIO
POCT-PROTHROMBIN TIME: 29.9 SECS
QUALITY CONTROL: YES

## 2017-10-10 PROCEDURE — 85610 PROTHROMBIN TIME: CPT | Mod: QW

## 2017-10-10 PROCEDURE — 99211 OFF/OP EST MAY X REQ PHY/QHP: CPT

## 2017-10-25 ENCOUNTER — RX RENEWAL (OUTPATIENT)
Age: 82
End: 2017-10-25

## 2017-10-26 ENCOUNTER — RX RENEWAL (OUTPATIENT)
Age: 82
End: 2017-10-26

## 2017-10-30 ENCOUNTER — NON-APPOINTMENT (OUTPATIENT)
Age: 82
End: 2017-10-30

## 2017-10-30 ENCOUNTER — APPOINTMENT (OUTPATIENT)
Dept: CARDIOLOGY | Facility: CLINIC | Age: 82
End: 2017-10-30
Payer: MEDICARE

## 2017-10-30 VITALS
BODY MASS INDEX: 24.71 KG/M2 | HEIGHT: 68 IN | HEART RATE: 82 BPM | DIASTOLIC BLOOD PRESSURE: 81 MMHG | OXYGEN SATURATION: 97 % | SYSTOLIC BLOOD PRESSURE: 127 MMHG | WEIGHT: 163 LBS | RESPIRATION RATE: 16 BRPM

## 2017-10-30 LAB — INR PPP: 2.6 RATIO

## 2017-10-30 PROCEDURE — 99214 OFFICE O/P EST MOD 30 MIN: CPT

## 2017-10-30 PROCEDURE — 93000 ELECTROCARDIOGRAM COMPLETE: CPT

## 2017-10-31 ENCOUNTER — APPOINTMENT (OUTPATIENT)
Dept: CARDIOLOGY | Facility: CLINIC | Age: 82
End: 2017-10-31

## 2017-11-01 LAB
BASOPHILS # BLD AUTO: 0.05 K/UL
BASOPHILS NFR BLD AUTO: 0.8 %
DEPRECATED KAPPA LC FREE/LAMBDA SER: 1.36 RATIO
DEPRECATED KAPPA LC FREE/LAMBDA SER: 1.36 RATIO
EOSINOPHIL # BLD AUTO: 0.21 K/UL
EOSINOPHIL NFR BLD AUTO: 3.3 %
HCT VFR BLD CALC: 41.2 %
HGB BLD-MCNC: 13.2 G/DL
IGA SER QL IEP: 359 MG/DL
IGG SER QL IEP: 1460 MG/DL
IGM SER QL IEP: 132 MG/DL
IMM GRANULOCYTES NFR BLD AUTO: 0.2 %
KAPPA LC CSF-MCNC: 2.79 MG/DL
KAPPA LC CSF-MCNC: 2.79 MG/DL
KAPPA LC SERPL-MCNC: 3.8 MG/DL
KAPPA LC SERPL-MCNC: 3.8 MG/DL
LYMPHOCYTES # BLD AUTO: 0.85 K/UL
LYMPHOCYTES NFR BLD AUTO: 13.4 %
MAN DIFF?: NORMAL
MCHC RBC-ENTMCNC: 32 GM/DL
MCHC RBC-ENTMCNC: 32.6 PG
MCV RBC AUTO: 101.7 FL
MONOCYTES # BLD AUTO: 0.73 K/UL
MONOCYTES NFR BLD AUTO: 11.5 %
NEUTROPHILS # BLD AUTO: 4.5 K/UL
NEUTROPHILS NFR BLD AUTO: 70.8 %
PLATELET # BLD AUTO: 292 K/UL
RBC # BLD: 4.05 M/UL
RBC # BLD: 4.05 M/UL
RBC # FLD: 16.7 %
RETICS # AUTO: 1.9 %
RETICS AGGREG/RBC NFR: 76.9 K/UL
WBC # FLD AUTO: 6.35 K/UL

## 2017-11-02 LAB
CALCIUM SERPL-MCNC: 10.7 MG/DL
CREAT SERPL-MCNC: 1.02 MG/DL
HAPTOGLOB SERPL-MCNC: <20 MG/DL
IRON SATN MFR SERPL: 29 %
IRON SERPL-MCNC: 83 UG/DL
LDH SERPL-CCNC: 240 U/L
TIBC SERPL-MCNC: 285 UG/DL
UIBC SERPL-MCNC: 202 UG/DL

## 2017-11-03 LAB
CHOLEST SERPL-MCNC: 152 MG/DL
CHOLEST/HDLC SERPL: 2.2 RATIO
FERRITIN SERPL-MCNC: 109 NG/ML
HDLC SERPL-MCNC: 70 MG/DL
LDLC SERPL CALC-MCNC: 66 MG/DL
TRIGL SERPL-MCNC: 80 MG/DL

## 2017-11-04 LAB
LDH SERPL-CCNC: 211 U/L
LDH1 CFR SERPL ELPH: 29 %
LDH1/LDH2 SERPL-CRTO: 0.9
LDH2 CFR SERPL ELPH: 32 %
LDH3 CFR SERPL ELPH: 20 %
LDH4 CFR SERPL ELPH: 8 %
LDH5 CFR SERPL ELPH: 10 %

## 2017-11-08 ENCOUNTER — APPOINTMENT (OUTPATIENT)
Dept: HEMATOLOGY ONCOLOGY | Facility: CLINIC | Age: 82
End: 2017-11-08
Payer: MEDICARE

## 2017-11-08 VITALS
BODY MASS INDEX: 24.94 KG/M2 | DIASTOLIC BLOOD PRESSURE: 66 MMHG | TEMPERATURE: 97.4 F | WEIGHT: 164 LBS | HEART RATE: 76 BPM | SYSTOLIC BLOOD PRESSURE: 116 MMHG

## 2017-11-08 PROCEDURE — 99214 OFFICE O/P EST MOD 30 MIN: CPT

## 2017-11-08 RX ORDER — ACYCLOVIR 800 MG/1
800 TABLET ORAL
Qty: 35 | Refills: 0 | Status: COMPLETED | COMMUNITY
Start: 2017-08-01 | End: 2017-11-08

## 2017-11-20 ENCOUNTER — LABORATORY RESULT (OUTPATIENT)
Age: 82
End: 2017-11-20

## 2017-11-20 ENCOUNTER — APPOINTMENT (OUTPATIENT)
Dept: CARDIOLOGY | Facility: CLINIC | Age: 82
End: 2017-11-20
Payer: MEDICARE

## 2017-11-20 VITALS — OXYGEN SATURATION: 96 % | SYSTOLIC BLOOD PRESSURE: 108 MMHG | DIASTOLIC BLOOD PRESSURE: 67 MMHG | HEART RATE: 71 BPM

## 2017-11-20 LAB
INR PPP: 2.8 RATIO
POCT-PROTHROMBIN TIME: 33.8 SECS
QUALITY CONTROL: YES

## 2017-11-20 PROCEDURE — 85610 PROTHROMBIN TIME: CPT | Mod: QW

## 2017-11-20 PROCEDURE — 99211 OFF/OP EST MAY X REQ PHY/QHP: CPT

## 2017-11-26 ENCOUNTER — RX RENEWAL (OUTPATIENT)
Age: 82
End: 2017-11-26

## 2017-12-04 ENCOUNTER — APPOINTMENT (OUTPATIENT)
Dept: CARDIOLOGY | Facility: CLINIC | Age: 82
End: 2017-12-04
Payer: MEDICARE

## 2017-12-04 ENCOUNTER — NON-APPOINTMENT (OUTPATIENT)
Age: 82
End: 2017-12-04

## 2017-12-04 VITALS
BODY MASS INDEX: 24.86 KG/M2 | OXYGEN SATURATION: 99 % | RESPIRATION RATE: 15 BRPM | SYSTOLIC BLOOD PRESSURE: 110 MMHG | HEART RATE: 62 BPM | DIASTOLIC BLOOD PRESSURE: 69 MMHG | HEIGHT: 68 IN | WEIGHT: 164 LBS

## 2017-12-04 LAB
INR PPP: 3.4 RATIO
POCT-PROTHROMBIN TIME: 40.3 SECS
QUALITY CONTROL: YES

## 2017-12-04 PROCEDURE — 93000 ELECTROCARDIOGRAM COMPLETE: CPT

## 2017-12-04 PROCEDURE — 99214 OFFICE O/P EST MOD 30 MIN: CPT

## 2017-12-04 PROCEDURE — 85610 PROTHROMBIN TIME: CPT | Mod: QW

## 2017-12-07 ENCOUNTER — APPOINTMENT (OUTPATIENT)
Dept: INTERNAL MEDICINE | Facility: CLINIC | Age: 82
End: 2017-12-07
Payer: MEDICARE

## 2017-12-07 PROCEDURE — 99215 OFFICE O/P EST HI 40 MIN: CPT

## 2017-12-11 ENCOUNTER — APPOINTMENT (OUTPATIENT)
Dept: CARDIOLOGY | Facility: CLINIC | Age: 82
End: 2017-12-11
Payer: MEDICARE

## 2017-12-11 VITALS — DIASTOLIC BLOOD PRESSURE: 69 MMHG | OXYGEN SATURATION: 97 % | SYSTOLIC BLOOD PRESSURE: 102 MMHG | HEART RATE: 70 BPM

## 2017-12-11 LAB
INR PPP: 3.8 RATIO
POCT-PROTHROMBIN TIME: 45.8 SECS
QUALITY CONTROL: YES

## 2017-12-11 PROCEDURE — 99211 OFF/OP EST MAY X REQ PHY/QHP: CPT

## 2017-12-11 PROCEDURE — 85610 PROTHROMBIN TIME: CPT | Mod: QW

## 2018-01-08 ENCOUNTER — EMERGENCY (EMERGENCY)
Facility: HOSPITAL | Age: 83
LOS: 1 days | Discharge: HOME | End: 2018-01-08
Admitting: EMERGENCY MEDICINE
Payer: MEDICARE

## 2018-01-08 ENCOUNTER — APPOINTMENT (OUTPATIENT)
Dept: CARDIOLOGY | Facility: CLINIC | Age: 83
End: 2018-01-08

## 2018-01-08 DIAGNOSIS — Z88.0 ALLERGY STATUS TO PENICILLIN: ICD-10-CM

## 2018-01-08 DIAGNOSIS — Z95.4 PRESENCE OF OTHER HEART-VALVE REPLACEMENT: Chronic | ICD-10-CM

## 2018-01-08 DIAGNOSIS — Z79.01 LONG TERM (CURRENT) USE OF ANTICOAGULANTS: ICD-10-CM

## 2018-01-08 DIAGNOSIS — I25.10 ATHEROSCLEROTIC HEART DISEASE OF NATIVE CORONARY ARTERY WITHOUT ANGINA PECTORIS: ICD-10-CM

## 2018-01-08 DIAGNOSIS — M84.372A STRESS FRACTURE, LEFT ANKLE, INITIAL ENCOUNTER FOR FRACTURE: Chronic | ICD-10-CM

## 2018-01-08 DIAGNOSIS — I48.91 UNSPECIFIED ATRIAL FIBRILLATION: ICD-10-CM

## 2018-01-08 DIAGNOSIS — Z79.899 OTHER LONG TERM (CURRENT) DRUG THERAPY: ICD-10-CM

## 2018-01-08 DIAGNOSIS — Z88.8 ALLERGY STATUS TO OTHER DRUGS, MEDICAMENTS AND BIOLOGICAL SUBSTANCES: ICD-10-CM

## 2018-01-08 DIAGNOSIS — I10 ESSENTIAL (PRIMARY) HYPERTENSION: ICD-10-CM

## 2018-01-08 DIAGNOSIS — Z86.69 PERSONAL HISTORY OF OTHER DISEASES OF THE NERVOUS SYSTEM AND SENSE ORGANS: Chronic | ICD-10-CM

## 2018-01-08 DIAGNOSIS — Z95.4 PRESENCE OF OTHER HEART-VALVE REPLACEMENT: ICD-10-CM

## 2018-01-08 DIAGNOSIS — R00.2 PALPITATIONS: ICD-10-CM

## 2018-01-08 DIAGNOSIS — Z79.82 LONG TERM (CURRENT) USE OF ASPIRIN: ICD-10-CM

## 2018-01-08 PROCEDURE — 85027 COMPLETE CBC AUTOMATED: CPT

## 2018-01-08 PROCEDURE — 99285 EMERGENCY DEPT VISIT HI MDM: CPT

## 2018-01-08 PROCEDURE — 93005 ELECTROCARDIOGRAM TRACING: CPT

## 2018-01-08 PROCEDURE — 71045 X-RAY EXAM CHEST 1 VIEW: CPT

## 2018-01-08 PROCEDURE — 85730 THROMBOPLASTIN TIME PARTIAL: CPT

## 2018-01-08 PROCEDURE — 84484 ASSAY OF TROPONIN QUANT: CPT

## 2018-01-08 PROCEDURE — 82553 CREATINE MB FRACTION: CPT

## 2018-01-08 PROCEDURE — 80053 COMPREHEN METABOLIC PANEL: CPT

## 2018-01-08 PROCEDURE — 71045 X-RAY EXAM CHEST 1 VIEW: CPT | Mod: 26

## 2018-01-08 PROCEDURE — 82550 ASSAY OF CK (CPK): CPT

## 2018-01-08 PROCEDURE — 93010 ELECTROCARDIOGRAM REPORT: CPT

## 2018-01-08 PROCEDURE — 99284 EMERGENCY DEPT VISIT MOD MDM: CPT | Mod: 25

## 2018-01-08 PROCEDURE — 96360 HYDRATION IV INFUSION INIT: CPT

## 2018-01-08 PROCEDURE — 96361 HYDRATE IV INFUSION ADD-ON: CPT

## 2018-01-08 PROCEDURE — 85610 PROTHROMBIN TIME: CPT

## 2018-01-09 ENCOUNTER — APPOINTMENT (OUTPATIENT)
Dept: CARDIOLOGY | Facility: CLINIC | Age: 83
End: 2018-01-09

## 2018-01-11 ENCOUNTER — APPOINTMENT (OUTPATIENT)
Dept: CARDIOLOGY | Facility: CLINIC | Age: 83
End: 2018-01-11
Payer: MEDICARE

## 2018-01-11 ENCOUNTER — NON-APPOINTMENT (OUTPATIENT)
Age: 83
End: 2018-01-11

## 2018-01-11 VITALS
DIASTOLIC BLOOD PRESSURE: 74 MMHG | HEART RATE: 89 BPM | SYSTOLIC BLOOD PRESSURE: 116 MMHG | HEIGHT: 68 IN | OXYGEN SATURATION: 97 % | RESPIRATION RATE: 16 BRPM | BODY MASS INDEX: 24.86 KG/M2 | WEIGHT: 164 LBS

## 2018-01-11 PROCEDURE — 93000 ELECTROCARDIOGRAM COMPLETE: CPT | Mod: 59

## 2018-01-11 PROCEDURE — 99215 OFFICE O/P EST HI 40 MIN: CPT | Mod: 25

## 2018-01-11 PROCEDURE — 93224 XTRNL ECG REC UP TO 48 HRS: CPT

## 2018-01-13 ENCOUNTER — RX RENEWAL (OUTPATIENT)
Age: 83
End: 2018-01-13

## 2018-01-16 ENCOUNTER — NON-APPOINTMENT (OUTPATIENT)
Age: 83
End: 2018-01-16

## 2018-01-18 ENCOUNTER — NON-APPOINTMENT (OUTPATIENT)
Age: 83
End: 2018-01-18

## 2018-01-18 ENCOUNTER — APPOINTMENT (OUTPATIENT)
Dept: CARDIOLOGY | Facility: CLINIC | Age: 83
End: 2018-01-18
Payer: MEDICARE

## 2018-01-18 VITALS
BODY MASS INDEX: 25.16 KG/M2 | OXYGEN SATURATION: 97 % | DIASTOLIC BLOOD PRESSURE: 64 MMHG | RESPIRATION RATE: 16 BRPM | HEIGHT: 68 IN | WEIGHT: 166 LBS | SYSTOLIC BLOOD PRESSURE: 104 MMHG | HEART RATE: 84 BPM

## 2018-01-18 PROCEDURE — 93000 ELECTROCARDIOGRAM COMPLETE: CPT

## 2018-01-18 PROCEDURE — 85610 PROTHROMBIN TIME: CPT | Mod: QW

## 2018-01-18 PROCEDURE — 99215 OFFICE O/P EST HI 40 MIN: CPT

## 2018-01-22 ENCOUNTER — APPOINTMENT (OUTPATIENT)
Dept: CARDIOLOGY | Facility: CLINIC | Age: 83
End: 2018-01-22

## 2018-01-22 ENCOUNTER — OTHER (OUTPATIENT)
Age: 83
End: 2018-01-22

## 2018-01-23 LAB
INR PPP: 3.8 RATIO
POCT-PROTHROMBIN TIME: 45.2 SECS
QUALITY CONTROL: YES

## 2018-02-05 ENCOUNTER — APPOINTMENT (OUTPATIENT)
Dept: INTERNAL MEDICINE | Facility: CLINIC | Age: 83
End: 2018-02-05
Payer: MEDICARE

## 2018-02-05 VITALS
HEIGHT: 68 IN | RESPIRATION RATE: 14 BRPM | DIASTOLIC BLOOD PRESSURE: 68 MMHG | BODY MASS INDEX: 24.71 KG/M2 | WEIGHT: 163 LBS | HEART RATE: 88 BPM | SYSTOLIC BLOOD PRESSURE: 112 MMHG

## 2018-02-05 PROCEDURE — 99215 OFFICE O/P EST HI 40 MIN: CPT

## 2018-02-06 ENCOUNTER — OUTPATIENT (OUTPATIENT)
Dept: OUTPATIENT SERVICES | Facility: HOSPITAL | Age: 83
LOS: 1 days | End: 2018-02-06
Payer: MEDICARE

## 2018-02-06 ENCOUNTER — CLINICAL ADVICE (OUTPATIENT)
Age: 83
End: 2018-02-06

## 2018-02-06 ENCOUNTER — APPOINTMENT (OUTPATIENT)
Dept: CARDIOTHORACIC SURGERY | Facility: CLINIC | Age: 83
End: 2018-02-06
Payer: MEDICARE

## 2018-02-06 ENCOUNTER — APPOINTMENT (OUTPATIENT)
Dept: CV DIAGNOSITCS | Facility: HOSPITAL | Age: 83
End: 2018-02-06

## 2018-02-06 VITALS
OXYGEN SATURATION: 98 % | RESPIRATION RATE: 18 BRPM | TEMPERATURE: 98 F | DIASTOLIC BLOOD PRESSURE: 73 MMHG | HEIGHT: 68 IN | SYSTOLIC BLOOD PRESSURE: 152 MMHG | WEIGHT: 162.92 LBS | HEART RATE: 79 BPM

## 2018-02-06 VITALS
OXYGEN SATURATION: 96 % | HEART RATE: 83 BPM | DIASTOLIC BLOOD PRESSURE: 81 MMHG | RESPIRATION RATE: 13 BRPM | TEMPERATURE: 98 F | SYSTOLIC BLOOD PRESSURE: 139 MMHG

## 2018-02-06 VITALS — BODY MASS INDEX: 24.25 KG/M2 | HEIGHT: 68 IN | WEIGHT: 160 LBS

## 2018-02-06 DIAGNOSIS — Z86.69 PERSONAL HISTORY OF OTHER DISEASES OF THE NERVOUS SYSTEM AND SENSE ORGANS: Chronic | ICD-10-CM

## 2018-02-06 DIAGNOSIS — Z95.4 PRESENCE OF OTHER HEART-VALVE REPLACEMENT: Chronic | ICD-10-CM

## 2018-02-06 DIAGNOSIS — I48.91 UNSPECIFIED ATRIAL FIBRILLATION: ICD-10-CM

## 2018-02-06 DIAGNOSIS — M84.372A STRESS FRACTURE, LEFT ANKLE, INITIAL ENCOUNTER FOR FRACTURE: Chronic | ICD-10-CM

## 2018-02-06 DIAGNOSIS — I34.0 NONRHEUMATIC MITRAL (VALVE) INSUFFICIENCY: ICD-10-CM

## 2018-02-06 LAB
ALBUMIN SERPL ELPH-MCNC: 4.1 G/DL — SIGNIFICANT CHANGE UP (ref 3.3–5)
ALP SERPL-CCNC: 101 U/L — SIGNIFICANT CHANGE UP (ref 40–120)
ALT FLD-CCNC: 22 U/L RC — SIGNIFICANT CHANGE UP (ref 10–45)
ANION GAP SERPL CALC-SCNC: 14 MMOL/L — SIGNIFICANT CHANGE UP (ref 5–17)
APTT BLD: 37.6 SEC — HIGH (ref 27.5–37.4)
AST SERPL-CCNC: 21 U/L — SIGNIFICANT CHANGE UP (ref 10–40)
BILIRUB SERPL-MCNC: 0.9 MG/DL — SIGNIFICANT CHANGE UP (ref 0.2–1.2)
BUN SERPL-MCNC: 41 MG/DL — HIGH (ref 7–23)
CALCIUM SERPL-MCNC: 10.4 MG/DL — SIGNIFICANT CHANGE UP (ref 8.4–10.5)
CHLORIDE SERPL-SCNC: 105 MMOL/L — SIGNIFICANT CHANGE UP (ref 96–108)
CO2 SERPL-SCNC: 26 MMOL/L — SIGNIFICANT CHANGE UP (ref 22–31)
CREAT SERPL-MCNC: 1.37 MG/DL — HIGH (ref 0.5–1.3)
GLUCOSE SERPL-MCNC: 102 MG/DL — HIGH (ref 70–99)
HCT VFR BLD CALC: 45.8 % — HIGH (ref 34.5–45)
HGB BLD-MCNC: 15.5 G/DL — SIGNIFICANT CHANGE UP (ref 11.5–15.5)
INR BLD: 2.03 RATIO — HIGH (ref 0.88–1.16)
MCHC RBC-ENTMCNC: 33.7 GM/DL — SIGNIFICANT CHANGE UP (ref 32–36)
MCHC RBC-ENTMCNC: 34.3 PG — HIGH (ref 27–34)
MCV RBC AUTO: 102 FL — HIGH (ref 80–100)
PLATELET # BLD AUTO: 235 K/UL — SIGNIFICANT CHANGE UP (ref 150–400)
POTASSIUM SERPL-MCNC: 4.5 MMOL/L — SIGNIFICANT CHANGE UP (ref 3.5–5.3)
POTASSIUM SERPL-SCNC: 4.5 MMOL/L — SIGNIFICANT CHANGE UP (ref 3.5–5.3)
PROT SERPL-MCNC: 8 G/DL — SIGNIFICANT CHANGE UP (ref 6–8.3)
PROTHROM AB SERPL-ACNC: 22.4 SEC — HIGH (ref 9.8–12.7)
RBC # BLD: 4.51 M/UL — SIGNIFICANT CHANGE UP (ref 3.8–5.2)
RBC # FLD: 14.2 % — SIGNIFICANT CHANGE UP (ref 10.3–14.5)
SODIUM SERPL-SCNC: 145 MMOL/L — SIGNIFICANT CHANGE UP (ref 135–145)
WBC # BLD: 7.7 K/UL — SIGNIFICANT CHANGE UP (ref 3.8–10.5)
WBC # FLD AUTO: 7.7 K/UL — SIGNIFICANT CHANGE UP (ref 3.8–10.5)

## 2018-02-06 PROCEDURE — 93306 TTE W/DOPPLER COMPLETE: CPT | Mod: 26

## 2018-02-06 PROCEDURE — 93306 TTE W/DOPPLER COMPLETE: CPT

## 2018-02-06 PROCEDURE — 93005 ELECTROCARDIOGRAM TRACING: CPT

## 2018-02-06 PROCEDURE — 99215 OFFICE O/P EST HI 40 MIN: CPT

## 2018-02-06 PROCEDURE — 85027 COMPLETE CBC AUTOMATED: CPT

## 2018-02-06 PROCEDURE — 85610 PROTHROMBIN TIME: CPT

## 2018-02-06 PROCEDURE — 93010 ELECTROCARDIOGRAM REPORT: CPT

## 2018-02-06 PROCEDURE — 85730 THROMBOPLASTIN TIME PARTIAL: CPT

## 2018-02-06 PROCEDURE — 93312 ECHO TRANSESOPHAGEAL: CPT | Mod: 26

## 2018-02-06 PROCEDURE — 80053 COMPREHEN METABOLIC PANEL: CPT

## 2018-02-06 PROCEDURE — 93312 ECHO TRANSESOPHAGEAL: CPT

## 2018-02-06 RX ORDER — SODIUM CHLORIDE 9 MG/ML
3 INJECTION INTRAMUSCULAR; INTRAVENOUS; SUBCUTANEOUS EVERY 8 HOURS
Qty: 0 | Refills: 0 | Status: DISCONTINUED | OUTPATIENT
Start: 2018-02-06 | End: 2018-02-21

## 2018-02-06 NOTE — H&P CARDIOLOGY - HISTORY OF PRESENT ILLNESS
89 yr old  female with PMHx of Afib on Coumadin(last dose 2/5) with previous cardioversion, anxiety, HTN, HLD, AVR (Mechanical on Coumadin), mitral clip(5/2016) Diverticulosis, Melanoma presents for MT and cardioversion. pt denies palpitation, SOB or dizziness. Pt lives alone with half day aid.

## 2018-02-06 NOTE — H&P CARDIOLOGY - EKG AND INTERPRETATION
a-fib @ 75, right axis deviation, incomplete RBBB, ST abnormality in II, III, V3-6, T wave abnormality in III, aVF

## 2018-02-06 NOTE — H&P CARDIOLOGY - PSH
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

## 2018-02-06 NOTE — H&P CARDIOLOGY - PMH
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

## 2018-02-12 ENCOUNTER — NON-APPOINTMENT (OUTPATIENT)
Age: 83
End: 2018-02-12

## 2018-02-12 ENCOUNTER — APPOINTMENT (OUTPATIENT)
Dept: CARDIOLOGY | Facility: CLINIC | Age: 83
End: 2018-02-12
Payer: MEDICARE

## 2018-02-12 VITALS
SYSTOLIC BLOOD PRESSURE: 120 MMHG | OXYGEN SATURATION: 94 % | BODY MASS INDEX: 24.55 KG/M2 | HEIGHT: 68 IN | HEART RATE: 100 BPM | RESPIRATION RATE: 15 BRPM | DIASTOLIC BLOOD PRESSURE: 80 MMHG | WEIGHT: 162 LBS

## 2018-02-12 LAB
INR PPP: 2.9 RATIO
POCT-PROTHROMBIN TIME: 34.8 SECS
QUALITY CONTROL: YES

## 2018-02-12 PROCEDURE — 99215 OFFICE O/P EST HI 40 MIN: CPT

## 2018-02-12 PROCEDURE — 85610 PROTHROMBIN TIME: CPT | Mod: QW

## 2018-02-12 PROCEDURE — 93000 ELECTROCARDIOGRAM COMPLETE: CPT

## 2018-02-17 ENCOUNTER — RX RENEWAL (OUTPATIENT)
Age: 83
End: 2018-02-17

## 2018-02-19 ENCOUNTER — RX RENEWAL (OUTPATIENT)
Age: 83
End: 2018-02-19

## 2018-02-20 ENCOUNTER — APPOINTMENT (OUTPATIENT)
Dept: CARDIOLOGY | Facility: CLINIC | Age: 83
End: 2018-02-20
Payer: MEDICARE

## 2018-02-20 LAB
INR PPP: 3.3 RATIO
POCT-PROTHROMBIN TIME: 39.4 SECS
QUALITY CONTROL: YES

## 2018-02-20 PROCEDURE — 99211 OFF/OP EST MAY X REQ PHY/QHP: CPT

## 2018-02-20 PROCEDURE — 85610 PROTHROMBIN TIME: CPT | Mod: QW

## 2018-02-23 ENCOUNTER — APPOINTMENT (OUTPATIENT)
Dept: INTERNAL MEDICINE | Facility: CLINIC | Age: 83
End: 2018-02-23
Payer: MEDICARE

## 2018-02-23 ENCOUNTER — LABORATORY RESULT (OUTPATIENT)
Age: 83
End: 2018-02-23

## 2018-02-23 VITALS
WEIGHT: 163 LBS | HEIGHT: 68 IN | BODY MASS INDEX: 24.71 KG/M2 | HEART RATE: 80 BPM | SYSTOLIC BLOOD PRESSURE: 108 MMHG | DIASTOLIC BLOOD PRESSURE: 74 MMHG | RESPIRATION RATE: 14 BRPM

## 2018-02-23 VITALS
HEART RATE: 88 BPM | DIASTOLIC BLOOD PRESSURE: 70 MMHG | TEMPERATURE: 97.4 F | SYSTOLIC BLOOD PRESSURE: 106 MMHG | OXYGEN SATURATION: 96 % | RESPIRATION RATE: 16 BRPM

## 2018-02-23 DIAGNOSIS — R11.10 VOMITING, UNSPECIFIED: ICD-10-CM

## 2018-02-23 LAB
ALBUMIN SERPL ELPH-MCNC: 3.9 G/DL
ALP BLD-CCNC: 98 U/L
ALT SERPL-CCNC: 17 U/L
ANION GAP SERPL CALC-SCNC: 15 MMOL/L
APPEARANCE: CLEAR
AST SERPL-CCNC: 18 U/L
BASOPHILS # BLD AUTO: 0.04 K/UL
BASOPHILS NFR BLD AUTO: 0.5 %
BILIRUB SERPL-MCNC: 1 MG/DL
BILIRUBIN URINE: NEGATIVE
BLOOD URINE: ABNORMAL
BUN SERPL-MCNC: 34 MG/DL
CALCIUM SERPL-MCNC: 10.5 MG/DL
CHLORIDE SERPL-SCNC: 106 MMOL/L
CHOLEST SERPL-MCNC: 168 MG/DL
CHOLEST/HDLC SERPL: 3.1 RATIO
CO2 SERPL-SCNC: 24 MMOL/L
COLOR: YELLOW
CREAT SERPL-MCNC: 1.54 MG/DL
CRP SERPL HS-MCNC: 2.1 MG/L
EOSINOPHIL # BLD AUTO: 0.11 K/UL
EOSINOPHIL NFR BLD AUTO: 1.3 %
GLUCOSE BS SERPL-MCNC: 91 MG/DL
GLUCOSE QUALITATIVE U: NEGATIVE
GLUCOSE SERPL-MCNC: 104 MG/DL
HBA1C MFR BLD HPLC: 6.5 %
HCT VFR BLD CALC: 45.5 %
HDLC SERPL-MCNC: 54 MG/DL
HGB BLD-MCNC: 14.7 G/DL
IMM GRANULOCYTES NFR BLD AUTO: 0.6 %
KETONES URINE: NEGATIVE
LDLC SERPL CALC-MCNC: 94 MG/DL
LEUKOCYTE ESTERASE URINE: NEGATIVE
LYMPHOCYTES # BLD AUTO: 0.78 K/UL
LYMPHOCYTES NFR BLD AUTO: 9.4 %
MAN DIFF?: NORMAL
MCHC RBC-ENTMCNC: 32 PG
MCHC RBC-ENTMCNC: 32.3 GM/DL
MCV RBC AUTO: 98.9 FL
MONOCYTES # BLD AUTO: 0.75 K/UL
MONOCYTES NFR BLD AUTO: 9 %
NEUTROPHILS # BLD AUTO: 6.61 K/UL
NEUTROPHILS NFR BLD AUTO: 79.2 %
NITRITE URINE: NEGATIVE
PH URINE: 7
PLATELET # BLD AUTO: 231 K/UL
POTASSIUM SERPL-SCNC: 4.8 MMOL/L
PROT SERPL-MCNC: 7.3 G/DL
PROTEIN URINE: 100
RBC # BLD: 4.6 M/UL
RBC # FLD: 17.3 %
SODIUM SERPL-SCNC: 145 MMOL/L
SPECIFIC GRAVITY URINE: 1.01
T4 FREE SERPL-MCNC: 1.4 NG/DL
TRIGL SERPL-MCNC: 101 MG/DL
TSH SERPL-ACNC: 2.12 UIU/ML
UROBILINOGEN URINE: 1
VIT B12 SERPL-MCNC: 1854 PG/ML
WBC # FLD AUTO: 8.34 K/UL

## 2018-02-23 PROCEDURE — 99215 OFFICE O/P EST HI 40 MIN: CPT

## 2018-02-24 PROBLEM — R11.10 VOMITING: Status: ACTIVE | Noted: 2018-02-24

## 2018-02-27 LAB — 25(OH)D3 SERPL-MCNC: 23 NG/ML

## 2018-03-01 ENCOUNTER — APPOINTMENT (OUTPATIENT)
Dept: CARDIOLOGY | Facility: CLINIC | Age: 83
End: 2018-03-01
Payer: MEDICARE

## 2018-03-01 VITALS
DIASTOLIC BLOOD PRESSURE: 81 MMHG | HEIGHT: 68 IN | OXYGEN SATURATION: 96 % | RESPIRATION RATE: 15 BRPM | BODY MASS INDEX: 24.71 KG/M2 | HEART RATE: 80 BPM | WEIGHT: 163 LBS | SYSTOLIC BLOOD PRESSURE: 119 MMHG

## 2018-03-01 LAB
INR PPP: 4.1 RATIO
POCT-PROTHROMBIN TIME: 48.9 SECS
QUALITY CONTROL: YES

## 2018-03-01 PROCEDURE — 93000 ELECTROCARDIOGRAM COMPLETE: CPT

## 2018-03-01 PROCEDURE — 85610 PROTHROMBIN TIME: CPT | Mod: QW

## 2018-03-01 PROCEDURE — 99215 OFFICE O/P EST HI 40 MIN: CPT

## 2018-03-06 ENCOUNTER — APPOINTMENT (OUTPATIENT)
Dept: CARDIOLOGY | Facility: CLINIC | Age: 83
End: 2018-03-06
Payer: MEDICARE

## 2018-03-06 VITALS — DIASTOLIC BLOOD PRESSURE: 79 MMHG | HEART RATE: 108 BPM | SYSTOLIC BLOOD PRESSURE: 114 MMHG | OXYGEN SATURATION: 97 %

## 2018-03-06 LAB
INR PPP: 2.8 RATIO
POCT-PROTHROMBIN TIME: 34.1 SECS
QUALITY CONTROL: YES

## 2018-03-06 PROCEDURE — 85610 PROTHROMBIN TIME: CPT | Mod: QW

## 2018-03-06 PROCEDURE — 99211 OFF/OP EST MAY X REQ PHY/QHP: CPT

## 2018-03-13 ENCOUNTER — APPOINTMENT (OUTPATIENT)
Dept: CARDIOLOGY | Facility: CLINIC | Age: 83
End: 2018-03-13
Payer: MEDICARE

## 2018-03-14 ENCOUNTER — FORM ENCOUNTER (OUTPATIENT)
Age: 83
End: 2018-03-14

## 2018-03-15 ENCOUNTER — APPOINTMENT (OUTPATIENT)
Dept: CARDIOLOGY | Facility: CLINIC | Age: 83
End: 2018-03-15
Payer: MEDICARE

## 2018-03-15 ENCOUNTER — NON-APPOINTMENT (OUTPATIENT)
Age: 83
End: 2018-03-15

## 2018-03-15 ENCOUNTER — APPOINTMENT (OUTPATIENT)
Dept: RADIOLOGY | Facility: HOSPITAL | Age: 83
End: 2018-03-15
Payer: MEDICARE

## 2018-03-15 ENCOUNTER — OUTPATIENT (OUTPATIENT)
Dept: OUTPATIENT SERVICES | Facility: HOSPITAL | Age: 83
LOS: 1 days | End: 2018-03-15
Payer: MEDICARE

## 2018-03-15 VITALS
HEIGHT: 68 IN | SYSTOLIC BLOOD PRESSURE: 104 MMHG | RESPIRATION RATE: 15 BRPM | OXYGEN SATURATION: 97 % | WEIGHT: 160 LBS | BODY MASS INDEX: 24.25 KG/M2 | HEART RATE: 90 BPM | DIASTOLIC BLOOD PRESSURE: 72 MMHG

## 2018-03-15 DIAGNOSIS — Z86.69 PERSONAL HISTORY OF OTHER DISEASES OF THE NERVOUS SYSTEM AND SENSE ORGANS: Chronic | ICD-10-CM

## 2018-03-15 DIAGNOSIS — J90 PLEURAL EFFUSION, NOT ELSEWHERE CLASSIFIED: ICD-10-CM

## 2018-03-15 DIAGNOSIS — M84.372A STRESS FRACTURE, LEFT ANKLE, INITIAL ENCOUNTER FOR FRACTURE: Chronic | ICD-10-CM

## 2018-03-15 DIAGNOSIS — Z95.2 PRESENCE OF PROSTHETIC HEART VALVE: ICD-10-CM

## 2018-03-15 DIAGNOSIS — Z00.8 ENCOUNTER FOR OTHER GENERAL EXAMINATION: ICD-10-CM

## 2018-03-15 DIAGNOSIS — Z95.4 PRESENCE OF OTHER HEART-VALVE REPLACEMENT: Chronic | ICD-10-CM

## 2018-03-15 LAB
INR PPP: 7.1 RATIO
POCT-PROTHROMBIN TIME: 24.7 SECS
QUALITY CONTROL: YES

## 2018-03-15 PROCEDURE — 93000 ELECTROCARDIOGRAM COMPLETE: CPT

## 2018-03-15 PROCEDURE — 71046 X-RAY EXAM CHEST 2 VIEWS: CPT | Mod: 26

## 2018-03-15 PROCEDURE — 85610 PROTHROMBIN TIME: CPT | Mod: QW

## 2018-03-15 PROCEDURE — 99215 OFFICE O/P EST HI 40 MIN: CPT

## 2018-03-15 PROCEDURE — 71046 X-RAY EXAM CHEST 2 VIEWS: CPT

## 2018-03-19 ENCOUNTER — APPOINTMENT (OUTPATIENT)
Dept: CARDIOLOGY | Facility: CLINIC | Age: 83
End: 2018-03-19
Payer: MEDICARE

## 2018-03-19 VITALS
HEIGHT: 68 IN | BODY MASS INDEX: 24.4 KG/M2 | HEART RATE: 90 BPM | WEIGHT: 161 LBS | OXYGEN SATURATION: 98 % | RESPIRATION RATE: 15 BRPM | DIASTOLIC BLOOD PRESSURE: 68 MMHG | SYSTOLIC BLOOD PRESSURE: 101 MMHG

## 2018-03-19 LAB
INR PPP: 2.9 RATIO
POCT-PROTHROMBIN TIME: 34.6 SECS
QUALITY CONTROL: YES

## 2018-03-19 PROCEDURE — 85610 PROTHROMBIN TIME: CPT | Mod: QW

## 2018-03-19 PROCEDURE — 99214 OFFICE O/P EST MOD 30 MIN: CPT

## 2018-03-20 ENCOUNTER — RX RENEWAL (OUTPATIENT)
Age: 83
End: 2018-03-20

## 2018-03-22 ENCOUNTER — APPOINTMENT (OUTPATIENT)
Dept: CARDIOLOGY | Facility: CLINIC | Age: 83
End: 2018-03-22
Payer: MEDICARE

## 2018-03-22 VITALS — DIASTOLIC BLOOD PRESSURE: 70 MMHG | SYSTOLIC BLOOD PRESSURE: 104 MMHG | HEART RATE: 89 BPM | OXYGEN SATURATION: 95 %

## 2018-03-22 LAB
INR PPP: 2.3 RATIO
POCT-PROTHROMBIN TIME: 27.2 SECS
QUALITY CONTROL: YES

## 2018-03-22 PROCEDURE — 85610 PROTHROMBIN TIME: CPT | Mod: QW

## 2018-03-22 PROCEDURE — 99211 OFF/OP EST MAY X REQ PHY/QHP: CPT

## 2018-03-29 LAB
INR PPP: 2.43
PT BLD: 28

## 2018-04-03 ENCOUNTER — APPOINTMENT (OUTPATIENT)
Dept: INTERNAL MEDICINE | Facility: CLINIC | Age: 83
End: 2018-04-03
Payer: MEDICARE

## 2018-04-03 VITALS
HEART RATE: 68 BPM | BODY MASS INDEX: 24.55 KG/M2 | DIASTOLIC BLOOD PRESSURE: 68 MMHG | HEIGHT: 68 IN | WEIGHT: 162 LBS | SYSTOLIC BLOOD PRESSURE: 112 MMHG | RESPIRATION RATE: 14 BRPM

## 2018-04-03 PROCEDURE — 99215 OFFICE O/P EST HI 40 MIN: CPT

## 2018-04-03 RX ORDER — AZITHROMYCIN 250 MG/1
250 TABLET, FILM COATED ORAL
Qty: 6 | Refills: 0 | Status: DISCONTINUED | COMMUNITY
Start: 2018-03-06 | End: 2018-04-03

## 2018-04-03 RX ORDER — ONDANSETRON 4 MG/1
4 TABLET, ORALLY DISINTEGRATING ORAL
Qty: 6 | Refills: 1 | Status: DISCONTINUED | COMMUNITY
Start: 2018-02-24 | End: 2018-04-03

## 2018-04-05 ENCOUNTER — APPOINTMENT (OUTPATIENT)
Dept: CARDIOLOGY | Facility: CLINIC | Age: 83
End: 2018-04-05
Payer: MEDICARE

## 2018-04-05 ENCOUNTER — NON-APPOINTMENT (OUTPATIENT)
Age: 83
End: 2018-04-05

## 2018-04-05 VITALS
HEIGHT: 68 IN | RESPIRATION RATE: 15 BRPM | SYSTOLIC BLOOD PRESSURE: 115 MMHG | OXYGEN SATURATION: 92 % | BODY MASS INDEX: 24.4 KG/M2 | WEIGHT: 161 LBS | DIASTOLIC BLOOD PRESSURE: 73 MMHG | HEART RATE: 80 BPM

## 2018-04-05 DIAGNOSIS — Z95.818 OTHER SPECIFIED POSTPROCEDURAL STATES: ICD-10-CM

## 2018-04-05 DIAGNOSIS — Z98.890 OTHER SPECIFIED POSTPROCEDURAL STATES: ICD-10-CM

## 2018-04-05 LAB
ALBUMIN SERPL ELPH-MCNC: 3.9 G/DL
ALP BLD-CCNC: 99 U/L
ALT SERPL-CCNC: 18 U/L
ANION GAP SERPL CALC-SCNC: 16 MMOL/L
AST SERPL-CCNC: 24 U/L
BASOPHILS # BLD AUTO: 0.02 K/UL
BASOPHILS NFR BLD AUTO: 0.3 %
BILIRUB SERPL-MCNC: 0.8 MG/DL
BUN SERPL-MCNC: 34 MG/DL
CALCIUM SERPL-MCNC: 10.3 MG/DL
CHLORIDE SERPL-SCNC: 103 MMOL/L
CO2 SERPL-SCNC: 24 MMOL/L
CREAT SERPL-MCNC: 1.53 MG/DL
EOSINOPHIL # BLD AUTO: 0.09 K/UL
EOSINOPHIL NFR BLD AUTO: 1.4 %
GLUCOSE SERPL-MCNC: 106 MG/DL
HCT VFR BLD CALC: 44.8 %
HGB BLD-MCNC: 14.7 G/DL
IMM GRANULOCYTES NFR BLD AUTO: 0.6 %
INR PPP: 4.3 RATIO
LYMPHOCYTES # BLD AUTO: 0.7 K/UL
LYMPHOCYTES NFR BLD AUTO: 10.9 %
MAGNESIUM SERPL-MCNC: 2.3 MG/DL
MAN DIFF?: NORMAL
MCHC RBC-ENTMCNC: 31.6 PG
MCHC RBC-ENTMCNC: 32.8 GM/DL
MCV RBC AUTO: 96.3 FL
MONOCYTES # BLD AUTO: 0.6 K/UL
MONOCYTES NFR BLD AUTO: 9.3 %
NEUTROPHILS # BLD AUTO: 4.99 K/UL
NEUTROPHILS NFR BLD AUTO: 77.5 %
PLATELET # BLD AUTO: 229 K/UL
POCT-PROTHROMBIN TIME: 51.7 SECS
POTASSIUM SERPL-SCNC: 5.1 MMOL/L
PROT SERPL-MCNC: 7.1 G/DL
QUALITY CONTROL: YES
RBC # BLD: 4.65 M/UL
RBC # FLD: 17.6 %
SODIUM SERPL-SCNC: 143 MMOL/L
TSH SERPL-ACNC: 2.19 UIU/ML
URATE SERPL-MCNC: 6.3 MG/DL
WBC # FLD AUTO: 6.44 K/UL

## 2018-04-05 PROCEDURE — 99215 OFFICE O/P EST HI 40 MIN: CPT

## 2018-04-05 PROCEDURE — 85610 PROTHROMBIN TIME: CPT | Mod: QW

## 2018-04-05 PROCEDURE — 93000 ELECTROCARDIOGRAM COMPLETE: CPT

## 2018-04-12 ENCOUNTER — EMERGENCY (EMERGENCY)
Facility: HOSPITAL | Age: 83
LOS: 1 days | Discharge: HOME | End: 2018-04-12
Admitting: INTERNAL MEDICINE
Payer: MEDICARE

## 2018-04-12 DIAGNOSIS — Z86.69 PERSONAL HISTORY OF OTHER DISEASES OF THE NERVOUS SYSTEM AND SENSE ORGANS: Chronic | ICD-10-CM

## 2018-04-12 DIAGNOSIS — M84.372A STRESS FRACTURE, LEFT ANKLE, INITIAL ENCOUNTER FOR FRACTURE: Chronic | ICD-10-CM

## 2018-04-12 DIAGNOSIS — Z95.4 PRESENCE OF OTHER HEART-VALVE REPLACEMENT: Chronic | ICD-10-CM

## 2018-04-12 PROCEDURE — 30901 CONTROL OF NOSEBLEED: CPT

## 2018-04-12 PROCEDURE — 99284 EMERGENCY DEPT VISIT MOD MDM: CPT | Mod: 25

## 2018-04-13 ENCOUNTER — APPOINTMENT (OUTPATIENT)
Dept: CARDIOLOGY | Facility: CLINIC | Age: 83
End: 2018-04-13
Payer: MEDICARE

## 2018-04-14 ENCOUNTER — INPATIENT (INPATIENT)
Facility: HOSPITAL | Age: 83
LOS: 1 days | Discharge: HOME | DRG: 982 | End: 2018-04-16
Attending: INTERNAL MEDICINE | Admitting: INTERNAL MEDICINE
Payer: MEDICARE

## 2018-04-14 DIAGNOSIS — M84.372A STRESS FRACTURE, LEFT ANKLE, INITIAL ENCOUNTER FOR FRACTURE: Chronic | ICD-10-CM

## 2018-04-14 DIAGNOSIS — E78.5 HYPERLIPIDEMIA, UNSPECIFIED: ICD-10-CM

## 2018-04-14 DIAGNOSIS — T45.515A ADVERSE EFFECT OF ANTICOAGULANTS, INITIAL ENCOUNTER: ICD-10-CM

## 2018-04-14 DIAGNOSIS — R04.0 EPISTAXIS: ICD-10-CM

## 2018-04-14 DIAGNOSIS — Z86.69 PERSONAL HISTORY OF OTHER DISEASES OF THE NERVOUS SYSTEM AND SENSE ORGANS: Chronic | ICD-10-CM
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
## 2018-04-14 DIAGNOSIS — I48.91 UNSPECIFIED ATRIAL FIBRILLATION: ICD-10-CM

## 2018-04-14 DIAGNOSIS — Z95.2 PRESENCE OF PROSTHETIC HEART VALVE: ICD-10-CM

## 2018-04-14 DIAGNOSIS — Z79.82 LONG TERM (CURRENT) USE OF ASPIRIN: ICD-10-CM

## 2018-04-14 DIAGNOSIS — Z88.0 ALLERGY STATUS TO PENICILLIN: ICD-10-CM

## 2018-04-14 DIAGNOSIS — I10 ESSENTIAL (PRIMARY) HYPERTENSION: ICD-10-CM

## 2018-04-14 DIAGNOSIS — Z79.01 LONG TERM (CURRENT) USE OF ANTICOAGULANTS: ICD-10-CM

## 2018-04-14 DIAGNOSIS — I05.0 RHEUMATIC MITRAL STENOSIS: ICD-10-CM

## 2018-04-14 DIAGNOSIS — R55 SYNCOPE AND COLLAPSE: ICD-10-CM

## 2018-04-14 DIAGNOSIS — Z95.4 PRESENCE OF OTHER HEART-VALVE REPLACEMENT: Chronic | ICD-10-CM

## 2018-04-14 DIAGNOSIS — D68.32 HEMORRHAGIC DISORDER DUE TO EXTRINSIC CIRCULATING ANTICOAGULANTS: ICD-10-CM

## 2018-04-14 DIAGNOSIS — E87.6 HYPOKALEMIA: ICD-10-CM

## 2018-04-14 PROCEDURE — 99223 1ST HOSP IP/OBS HIGH 75: CPT | Mod: AI

## 2018-04-15 PROCEDURE — 99235 HOSP IP/OBS SAME DATE MOD 70: CPT

## 2018-04-15 PROCEDURE — 99214 OFFICE O/P EST MOD 30 MIN: CPT

## 2018-04-15 PROCEDURE — 93306 TTE W/DOPPLER COMPLETE: CPT | Mod: 26

## 2018-04-16 ENCOUNTER — EMERGENCY (EMERGENCY)
Facility: HOSPITAL | Age: 83
LOS: 1 days | Discharge: HOME | End: 2018-04-16
Admitting: EMERGENCY MEDICINE
Payer: MEDICARE

## 2018-04-16 DIAGNOSIS — Z95.2 PRESENCE OF PROSTHETIC HEART VALVE: ICD-10-CM

## 2018-04-16 DIAGNOSIS — Z87.19 PERSONAL HISTORY OF OTHER DISEASES OF THE DIGESTIVE SYSTEM: ICD-10-CM

## 2018-04-16 DIAGNOSIS — M84.372A STRESS FRACTURE, LEFT ANKLE, INITIAL ENCOUNTER FOR FRACTURE: Chronic | ICD-10-CM

## 2018-04-16 DIAGNOSIS — R04.0 EPISTAXIS: ICD-10-CM

## 2018-04-16 DIAGNOSIS — Z88.8 ALLERGY STATUS TO OTHER DRUGS, MEDICAMENTS AND BIOLOGICAL SUBSTANCES: ICD-10-CM

## 2018-04-16 DIAGNOSIS — Z86.69 PERSONAL HISTORY OF OTHER DISEASES OF THE NERVOUS SYSTEM AND SENSE ORGANS: Chronic | ICD-10-CM

## 2018-04-16 DIAGNOSIS — I25.10 ATHEROSCLEROTIC HEART DISEASE OF NATIVE CORONARY ARTERY WITHOUT ANGINA PECTORIS: ICD-10-CM

## 2018-04-16 DIAGNOSIS — Z79.01 LONG TERM (CURRENT) USE OF ANTICOAGULANTS: ICD-10-CM

## 2018-04-16 DIAGNOSIS — Z88.0 ALLERGY STATUS TO PENICILLIN: ICD-10-CM

## 2018-04-16 DIAGNOSIS — Z95.4 PRESENCE OF OTHER HEART-VALVE REPLACEMENT: Chronic | ICD-10-CM

## 2018-04-16 DIAGNOSIS — Z79.899 OTHER LONG TERM (CURRENT) DRUG THERAPY: ICD-10-CM

## 2018-04-16 DIAGNOSIS — I10 ESSENTIAL (PRIMARY) HYPERTENSION: ICD-10-CM

## 2018-04-16 DIAGNOSIS — Z79.82 LONG TERM (CURRENT) USE OF ASPIRIN: ICD-10-CM

## 2018-04-16 PROCEDURE — 85610 PROTHROMBIN TIME: CPT

## 2018-04-16 PROCEDURE — 99239 HOSP IP/OBS DSCHRG MGMT >30: CPT

## 2018-04-16 PROCEDURE — 85027 COMPLETE CBC AUTOMATED: CPT

## 2018-04-16 PROCEDURE — 93005 ELECTROCARDIOGRAM TRACING: CPT

## 2018-04-16 PROCEDURE — 80048 BASIC METABOLIC PNL TOTAL CA: CPT

## 2018-04-16 PROCEDURE — 85730 THROMBOPLASTIN TIME PARTIAL: CPT

## 2018-04-16 PROCEDURE — 99282 EMERGENCY DEPT VISIT SF MDM: CPT

## 2018-04-16 PROCEDURE — 84484 ASSAY OF TROPONIN QUANT: CPT

## 2018-04-16 PROCEDURE — 36415 COLL VENOUS BLD VENIPUNCTURE: CPT

## 2018-04-16 PROCEDURE — 30903 CONTROL OF NOSEBLEED: CPT | Mod: RT

## 2018-04-16 PROCEDURE — 82550 ASSAY OF CK (CPK): CPT

## 2018-04-16 PROCEDURE — 30901 CONTROL OF NOSEBLEED: CPT | Mod: RT

## 2018-04-16 PROCEDURE — 99283 EMERGENCY DEPT VISIT LOW MDM: CPT | Mod: 25

## 2018-04-16 PROCEDURE — 93306 TTE W/DOPPLER COMPLETE: CPT

## 2018-04-16 PROCEDURE — 82553 CREATINE MB FRACTION: CPT

## 2018-04-16 PROCEDURE — 80053 COMPREHEN METABOLIC PANEL: CPT

## 2018-04-16 PROCEDURE — 99285 EMERGENCY DEPT VISIT HI MDM: CPT | Mod: 25

## 2018-04-19 ENCOUNTER — APPOINTMENT (OUTPATIENT)
Dept: CARDIOLOGY | Facility: CLINIC | Age: 83
End: 2018-04-19
Payer: MEDICARE

## 2018-04-19 LAB
INR PPP: 1.6 RATIO
POCT-PROTHROMBIN TIME: 17.1 SECS
QUALITY CONTROL: YES

## 2018-04-19 PROCEDURE — 99211 OFF/OP EST MAY X REQ PHY/QHP: CPT

## 2018-04-19 PROCEDURE — 85610 PROTHROMBIN TIME: CPT | Mod: QW

## 2018-04-22 ENCOUNTER — RX RENEWAL (OUTPATIENT)
Age: 83
End: 2018-04-22

## 2018-04-23 ENCOUNTER — NON-APPOINTMENT (OUTPATIENT)
Age: 83
End: 2018-04-23

## 2018-04-23 ENCOUNTER — APPOINTMENT (OUTPATIENT)
Dept: CARDIOLOGY | Facility: CLINIC | Age: 83
End: 2018-04-23
Payer: MEDICARE

## 2018-04-23 VITALS
SYSTOLIC BLOOD PRESSURE: 111 MMHG | DIASTOLIC BLOOD PRESSURE: 72 MMHG | BODY MASS INDEX: 67.81 KG/M2 | OXYGEN SATURATION: 97 % | RESPIRATION RATE: 15 BRPM | HEART RATE: 80 BPM | WEIGHT: 293 LBS | HEIGHT: 55 IN

## 2018-04-23 LAB
INR PPP: 1.3 RATIO
POCT-PROTHROMBIN TIME: 16 SECS
QUALITY CONTROL: YES

## 2018-04-23 PROCEDURE — 99215 OFFICE O/P EST HI 40 MIN: CPT

## 2018-04-23 PROCEDURE — 85610 PROTHROMBIN TIME: CPT | Mod: QW

## 2018-04-23 PROCEDURE — 93000 ELECTROCARDIOGRAM COMPLETE: CPT

## 2018-04-26 ENCOUNTER — APPOINTMENT (OUTPATIENT)
Dept: CARDIOLOGY | Facility: CLINIC | Age: 83
End: 2018-04-26
Payer: MEDICARE

## 2018-04-26 LAB
INR PPP: 1.5 RATIO
POCT-PROTHROMBIN TIME: 17.6 SECS
QUALITY CONTROL: YES

## 2018-04-26 PROCEDURE — 99211 OFF/OP EST MAY X REQ PHY/QHP: CPT

## 2018-04-26 PROCEDURE — 85610 PROTHROMBIN TIME: CPT | Mod: QW

## 2018-05-01 ENCOUNTER — APPOINTMENT (OUTPATIENT)
Dept: CARDIOLOGY | Facility: CLINIC | Age: 83
End: 2018-05-01
Payer: MEDICARE

## 2018-05-01 VITALS — OXYGEN SATURATION: 97 % | DIASTOLIC BLOOD PRESSURE: 75 MMHG | HEART RATE: 91 BPM | SYSTOLIC BLOOD PRESSURE: 130 MMHG

## 2018-05-01 LAB
INR PPP: 1.8 RATIO
POCT-PROTHROMBIN TIME: 21 SECS
QUALITY CONTROL: NO

## 2018-05-01 PROCEDURE — 85610 PROTHROMBIN TIME: CPT | Mod: QW

## 2018-05-01 PROCEDURE — 99211 OFF/OP EST MAY X REQ PHY/QHP: CPT

## 2018-05-07 ENCOUNTER — APPOINTMENT (OUTPATIENT)
Dept: CARDIOLOGY | Facility: CLINIC | Age: 83
End: 2018-05-07
Payer: MEDICARE

## 2018-05-07 VITALS — HEART RATE: 80 BPM | DIASTOLIC BLOOD PRESSURE: 80 MMHG | SYSTOLIC BLOOD PRESSURE: 123 MMHG

## 2018-05-07 PROCEDURE — 85610 PROTHROMBIN TIME: CPT | Mod: QW

## 2018-05-07 PROCEDURE — 99211 OFF/OP EST MAY X REQ PHY/QHP: CPT

## 2018-05-08 LAB
INR PPP: 2.2 RATIO
POCT-PROTHROMBIN TIME: 26.7 SECS
QUALITY CONTROL: YES

## 2018-05-09 ENCOUNTER — LABORATORY RESULT (OUTPATIENT)
Age: 83
End: 2018-05-09

## 2018-05-14 ENCOUNTER — APPOINTMENT (OUTPATIENT)
Dept: HEMATOLOGY ONCOLOGY | Facility: CLINIC | Age: 83
End: 2018-05-14
Payer: MEDICARE

## 2018-05-14 VITALS
WEIGHT: 163 LBS | SYSTOLIC BLOOD PRESSURE: 106 MMHG | TEMPERATURE: 98.2 F | BODY MASS INDEX: 1790.67 KG/M2 | DIASTOLIC BLOOD PRESSURE: 60 MMHG | HEART RATE: 64 BPM

## 2018-05-14 DIAGNOSIS — R55 SYNCOPE AND COLLAPSE: ICD-10-CM

## 2018-05-14 PROCEDURE — 99214 OFFICE O/P EST MOD 30 MIN: CPT

## 2018-05-14 RX ORDER — FERROUS SULFATE 325(65) MG
325 (65 FE) TABLET ORAL
Refills: 0 | Status: DISCONTINUED | COMMUNITY
End: 2018-05-14

## 2018-05-14 RX ORDER — CLINDAMYCIN HYDROCHLORIDE 300 MG/1
300 CAPSULE ORAL
Qty: 21 | Refills: 0 | Status: COMPLETED | COMMUNITY
Start: 2018-04-12

## 2018-05-15 ENCOUNTER — APPOINTMENT (OUTPATIENT)
Dept: CARDIOLOGY | Facility: CLINIC | Age: 83
End: 2018-05-15
Payer: MEDICARE

## 2018-05-15 LAB
INR PPP: 2.6 RATIO
POCT-PROTHROMBIN TIME: 31 SECS
QUALITY CONTROL: YES

## 2018-05-15 PROCEDURE — 85610 PROTHROMBIN TIME: CPT | Mod: QW

## 2018-05-15 PROCEDURE — 99211 OFF/OP EST MAY X REQ PHY/QHP: CPT

## 2018-05-17 ENCOUNTER — APPOINTMENT (OUTPATIENT)
Dept: CARDIOLOGY | Facility: CLINIC | Age: 83
End: 2018-05-17
Payer: MEDICARE

## 2018-05-17 ENCOUNTER — NON-APPOINTMENT (OUTPATIENT)
Age: 83
End: 2018-05-17

## 2018-05-17 VITALS
RESPIRATION RATE: 15 BRPM | HEART RATE: 95 BPM | WEIGHT: 164 LBS | HEIGHT: 68 IN | DIASTOLIC BLOOD PRESSURE: 80 MMHG | BODY MASS INDEX: 24.86 KG/M2 | OXYGEN SATURATION: 95 % | SYSTOLIC BLOOD PRESSURE: 127 MMHG

## 2018-05-17 DIAGNOSIS — I49.3 VENTRICULAR PREMATURE DEPOLARIZATION: ICD-10-CM

## 2018-05-17 PROCEDURE — 99215 OFFICE O/P EST HI 40 MIN: CPT

## 2018-05-17 PROCEDURE — 93000 ELECTROCARDIOGRAM COMPLETE: CPT

## 2018-05-19 ENCOUNTER — EMERGENCY (EMERGENCY)
Facility: HOSPITAL | Age: 83
LOS: 1 days | Discharge: HOME | End: 2018-05-19
Admitting: INTERNAL MEDICINE
Payer: MEDICARE

## 2018-05-19 DIAGNOSIS — L53.9 ERYTHEMATOUS CONDITION, UNSPECIFIED: ICD-10-CM

## 2018-05-19 DIAGNOSIS — Z79.82 LONG TERM (CURRENT) USE OF ASPIRIN: ICD-10-CM

## 2018-05-19 DIAGNOSIS — Z88.0 ALLERGY STATUS TO PENICILLIN: ICD-10-CM

## 2018-05-19 DIAGNOSIS — Z91.041 RADIOGRAPHIC DYE ALLERGY STATUS: ICD-10-CM

## 2018-05-19 DIAGNOSIS — M84.372A STRESS FRACTURE, LEFT ANKLE, INITIAL ENCOUNTER FOR FRACTURE: Chronic | ICD-10-CM

## 2018-05-19 DIAGNOSIS — L03.115 CELLULITIS OF RIGHT LOWER LIMB: ICD-10-CM

## 2018-05-19 DIAGNOSIS — Z79.01 LONG TERM (CURRENT) USE OF ANTICOAGULANTS: ICD-10-CM

## 2018-05-19 DIAGNOSIS — Z79.899 OTHER LONG TERM (CURRENT) DRUG THERAPY: ICD-10-CM

## 2018-05-19 DIAGNOSIS — Z95.2 PRESENCE OF PROSTHETIC HEART VALVE: ICD-10-CM

## 2018-05-19 DIAGNOSIS — Z95.4 PRESENCE OF OTHER HEART-VALVE REPLACEMENT: Chronic | ICD-10-CM

## 2018-05-19 DIAGNOSIS — Z86.69 PERSONAL HISTORY OF OTHER DISEASES OF THE NERVOUS SYSTEM AND SENSE ORGANS: Chronic | ICD-10-CM

## 2018-05-19 PROCEDURE — 99284 EMERGENCY DEPT VISIT MOD MDM: CPT

## 2018-05-20 PROCEDURE — 96365 THER/PROPH/DIAG IV INF INIT: CPT

## 2018-05-20 PROCEDURE — 80048 BASIC METABOLIC PNL TOTAL CA: CPT

## 2018-05-20 PROCEDURE — 85730 THROMBOPLASTIN TIME PARTIAL: CPT

## 2018-05-20 PROCEDURE — 85027 COMPLETE CBC AUTOMATED: CPT

## 2018-05-20 PROCEDURE — 85610 PROTHROMBIN TIME: CPT

## 2018-05-20 PROCEDURE — 99284 EMERGENCY DEPT VISIT MOD MDM: CPT | Mod: 25

## 2018-05-20 PROCEDURE — 96361 HYDRATE IV INFUSION ADD-ON: CPT

## 2018-05-21 ENCOUNTER — APPOINTMENT (OUTPATIENT)
Dept: CARDIOLOGY | Facility: CLINIC | Age: 83
End: 2018-05-21
Payer: MEDICARE

## 2018-05-21 VITALS
HEIGHT: 68 IN | OXYGEN SATURATION: 90 % | DIASTOLIC BLOOD PRESSURE: 66 MMHG | WEIGHT: 166 LBS | HEART RATE: 96 BPM | BODY MASS INDEX: 25.16 KG/M2 | SYSTOLIC BLOOD PRESSURE: 104 MMHG | RESPIRATION RATE: 16 BRPM

## 2018-05-21 LAB — INR PPP: 2.1 RATIO

## 2018-05-21 PROCEDURE — 85610 PROTHROMBIN TIME: CPT | Mod: QW

## 2018-05-21 PROCEDURE — 99214 OFFICE O/P EST MOD 30 MIN: CPT

## 2018-05-29 ENCOUNTER — APPOINTMENT (OUTPATIENT)
Dept: CARDIOLOGY | Facility: CLINIC | Age: 83
End: 2018-05-29
Payer: MEDICARE

## 2018-05-29 VITALS — DIASTOLIC BLOOD PRESSURE: 72 MMHG | HEART RATE: 80 BPM | OXYGEN SATURATION: 93 % | SYSTOLIC BLOOD PRESSURE: 112 MMHG

## 2018-05-29 LAB
INR PPP: 2.8 RATIO
POCT-PROTHROMBIN TIME: 34.1 SECS
QUALITY CONTROL: YES

## 2018-05-29 PROCEDURE — 85610 PROTHROMBIN TIME: CPT | Mod: QW

## 2018-05-29 PROCEDURE — 99211 OFF/OP EST MAY X REQ PHY/QHP: CPT

## 2018-05-31 ENCOUNTER — APPOINTMENT (OUTPATIENT)
Dept: CARDIOLOGY | Facility: CLINIC | Age: 83
End: 2018-05-31
Payer: MEDICARE

## 2018-05-31 VITALS
DIASTOLIC BLOOD PRESSURE: 71 MMHG | BODY MASS INDEX: 24.71 KG/M2 | OXYGEN SATURATION: 96 % | WEIGHT: 163 LBS | SYSTOLIC BLOOD PRESSURE: 112 MMHG | HEART RATE: 95 BPM | RESPIRATION RATE: 17 BRPM | HEIGHT: 68 IN

## 2018-05-31 PROCEDURE — 93000 ELECTROCARDIOGRAM COMPLETE: CPT

## 2018-05-31 PROCEDURE — 99214 OFFICE O/P EST MOD 30 MIN: CPT

## 2018-05-31 RX ORDER — DOXYCYCLINE HYCLATE 100 MG/1
100 CAPSULE ORAL
Qty: 20 | Refills: 0 | Status: DISCONTINUED | COMMUNITY
Start: 2018-05-19 | End: 2018-05-31

## 2018-06-01 ENCOUNTER — NON-APPOINTMENT (OUTPATIENT)
Age: 83
End: 2018-06-01

## 2018-06-04 ENCOUNTER — APPOINTMENT (OUTPATIENT)
Dept: CARDIOLOGY | Facility: CLINIC | Age: 83
End: 2018-06-04

## 2018-06-04 ENCOUNTER — INPATIENT (INPATIENT)
Facility: HOSPITAL | Age: 83
LOS: 2 days | Discharge: HOME | DRG: 293 | End: 2018-06-07
Attending: INTERNAL MEDICINE | Admitting: INTERNAL MEDICINE
Payer: MEDICARE

## 2018-06-04 ENCOUNTER — APPOINTMENT (OUTPATIENT)
Dept: INTERNAL MEDICINE | Facility: CLINIC | Age: 83
End: 2018-06-04
Payer: MEDICARE

## 2018-06-04 VITALS — RESPIRATION RATE: 14 BRPM | HEART RATE: 78 BPM

## 2018-06-04 VITALS
HEIGHT: 68 IN | DIASTOLIC BLOOD PRESSURE: 74 MMHG | RESPIRATION RATE: 14 BRPM | SYSTOLIC BLOOD PRESSURE: 124 MMHG | HEART RATE: 78 BPM | BODY MASS INDEX: 24.71 KG/M2 | WEIGHT: 163 LBS

## 2018-06-04 VITALS — BODY MASS INDEX: 24.78 KG/M2 | WEIGHT: 163 LBS

## 2018-06-04 DIAGNOSIS — Z86.69 PERSONAL HISTORY OF OTHER DISEASES OF THE NERVOUS SYSTEM AND SENSE ORGANS: Chronic | ICD-10-CM

## 2018-06-04 DIAGNOSIS — R04.0 EPISTAXIS: ICD-10-CM

## 2018-06-04 DIAGNOSIS — Z95.4 PRESENCE OF OTHER HEART-VALVE REPLACEMENT: Chronic | ICD-10-CM

## 2018-06-04 DIAGNOSIS — M84.372A STRESS FRACTURE, LEFT ANKLE, INITIAL ENCOUNTER FOR FRACTURE: Chronic | ICD-10-CM

## 2018-06-04 PROCEDURE — 99215 OFFICE O/P EST HI 40 MIN: CPT

## 2018-06-04 PROCEDURE — 93010 ELECTROCARDIOGRAM REPORT: CPT

## 2018-06-04 NOTE — HISTORY OF PRESENT ILLNESS
[Formal Caregiver] : formal caregiver [FreeTextEntry1] : Her followup to review her medications discuss overall health which recently have included nosebleeds shortness of breath on exertion and right leg cellulitis [de-identified] : -89year-old woman with a recent history of nose bleeds anemia anxiety atrial fibrillation hypertension recent cellulitis of the right lower extremity elevated cholesterol mitral stenosis and intraductal papillary mucinous neoplasm of the pancreas comes to office for followup recently seen the ear nose and throat specialist about nosebleeds which have finally calmed down she has questions about the saline that she uses in her nose is fat restricted because of her cardiac disease she denies temperature chills sweats or myalgias she's had no chest pain palpitations lightheadedness or syncope she is short of breath on exertion although not worse than its been over the past 6 months. She denies abdominal pain nausea vomiting diarrhea constipation or rectal bleeding her appetite has been poor and she has lost several pounds over the past month she is unable to use large meals and she tries to eat every 2 hours to establish the calories she has occasional lower back pain and pain in her knees and hips and slightly unsteady gait

## 2018-06-04 NOTE — PHYSICAL EXAM
[No Acute Distress] : no acute distress [Well Nourished] : well nourished [Well Developed] : well developed [Well-Appearing] : well-appearing [Normal Voice/Communication] : normal voice/communication [Normal Sclera/Conjunctiva] : normal sclera/conjunctiva [PERRL] : pupils equal round and reactive to light [EOMI] : extraocular movements intact [Normal Outer Ear/Nose] : the outer ears and nose were normal in appearance [Normal Oropharynx] : the oropharynx was normal [Normal TMs] : both tympanic membranes were normal [Normal Nasal Mucosa] : the nasal mucosa was normal [No JVD] : no jugular venous distention [Supple] : supple [No Lymphadenopathy] : no lymphadenopathy [Thyroid Normal, No Nodules] : the thyroid was normal and there were no nodules present [No Respiratory Distress] : no respiratory distress  [Clear to Auscultation] : lungs were clear to auscultation bilaterally [No Accessory Muscle Use] : no accessory muscle use [Normal Rate] : normal rate  [Regular Rhythm] : with a regular rhythm [Normal S1, S2] : normal S1 and S2 [No Murmur] : no murmur heard [No Carotid Bruits] : no carotid bruits [No Abdominal Bruit] : a ~M bruit was not heard ~T in the abdomen [No Varicosities] : no varicosities [Pedal Pulses Present] : the pedal pulses are present [No Edema] : there was no peripheral edema [No Extremity Clubbing/Cyanosis] : no extremity clubbing/cyanosis [No Palpable Aorta] : no palpable aorta [Declined Breast Exam] : declined breast exam  [Soft] : abdomen soft [Non Tender] : non-tender [Non-distended] : non-distended [No Masses] : no abdominal mass palpated [No HSM] : no HSM [Normal Bowel Sounds] : normal bowel sounds [No Hernias] : no hernias [Declined Rectal Exam] : declined rectal exam [Normal Supraclavicular Nodes] : no supraclavicular lymphadenopathy [Normal Axillary Nodes] : no axillary lymphadenopathy [Normal Posterior Cervical Nodes] : no posterior cervical lymphadenopathy [Normal Femoral Nodes] : no femoral lymphadenopathy [Normal Anterior Cervical Nodes] : no anterior cervical lymphadenopathy [Normal Inguinal Nodes] : no inguinal lymphadenopathy [No CVA Tenderness] : no CVA  tenderness [No Spinal Tenderness] : no spinal tenderness [No Joint Swelling] : no joint swelling [Grossly Normal Strength/Tone] : grossly normal strength/tone [No Rash] : no rash [Normal Gait] : normal gait [Coordination Grossly Intact] : coordination grossly intact [No Focal Deficits] : no focal deficits [Deep Tendon Reflexes (DTR)] : deep tendon reflexes were 2+ and symmetric [Speech Grossly Normal] : speech grossly normal [Memory Grossly Normal] : memory grossly normal [Normal Affect] : the affect was normal [Alert and Oriented x3] : oriented to person, place, and time [Normal Mood] : the mood was normal [Normal Insight/Judgement] : insight and judgment were intact [Kyphosis] : no kyphosis [Scoliosis] : no scoliosis [Acne] : no acne

## 2018-06-04 NOTE — REVIEW OF SYSTEMS
[Palpitations] : palpitations [Shortness Of Breath] : shortness of breath [Dyspnea on Exertion] : dyspnea on exertion [Joint Stiffness] : joint stiffness [Anxiety] : anxiety [Fever] : no fever [Chills] : no chills [Fatigue] : no fatigue [Hot Flashes] : no hot flashes [Night Sweats] : no night sweats [Recent Change In Weight] : ~T no recent weight change [Discharge] : no discharge [Pain] : no pain [Redness] : no redness [Dryness] : no dryness  [Vision Problems] : no vision problems [Itching] : no itching [Earache] : no earache [Hearing Loss] : no hearing loss [Nosebleed] : no nosebleeds [Hoarseness] : no hoarseness [Nasal Discharge] : no nasal discharge [Sore Throat] : no sore throat [Postnasal Drip] : no postnasal drip [Chest Pain] : no chest pain [Leg Claudication] : no leg claudication [Lower Ext Edema] : no lower extremity edema [Orthopnea] : no orthopnea [Paroysmal Nocturnal Dyspnea] : no paroysmal nocturnal dyspnea [Wheezing] : no wheezing [Cough] : no cough [Abdominal Pain] : no abdominal pain [Nausea] : no nausea [Constipation] : no constipation [Diarrhea] : diarrhea [Vomiting] : no vomiting [Heartburn] : no heartburn [Melena] : no melena [Dysuria] : no dysuria [Incontinence] : no incontinence [Nocturia] : no nocturia [Poor Libido] : libido not poor [Hematuria] : no hematuria [Frequency] : no frequency [Vaginal Discharge] : no vaginal discharge [Dysmenorrhea] : no dysmenorrhea [Joint Pain] : no joint pain [Joint Swelling] : no joint swelling [Muscle Weakness] : no muscle weakness [Muscle Pain] : no muscle pain [Back Pain] : no back pain [Itching] : no itching [Mole Changes] : no mole changes [Nail Changes] : no nail changes [Hair Changes] : no hair changes [Skin Rash] : no skin rash [Headache] : no headache [Dizziness] : no dizziness [Fainting] : no fainting [Confusion] : no confusion [Memory Loss] : no memory loss [Unsteady Walking] : no ataxia [Suicidal] : not suicidal [Insomnia] : no insomnia [Depression] : no depression [Easy Bleeding] : no easy bleeding [Easy Bruising] : no easy bruising [Swollen Glands] : no swollen glands

## 2018-06-04 NOTE — ASSESSMENT
[FreeTextEntry1] : Physical exam shows a well-developed female in no acute distress her weight was 163 pounds with clothes blood pressure was 124 with 74 pulse was between 70 and 80 irregularly irregular HEENT was unremarkable chest was clear cardiovascular exam was irregular with a 2/6 systolic murmur abdomen soft and nontender extremities showed trace lower extremity edema neurologic exam was nonfocal.......... or nose bleeds have finally stopped after 2 months of seen the ear nose and throat specialist and using medications she is allowed to use the saline irrigation which has no significant absorption her right leg cellulitis improved and no further treatment necessary she is in discussion with her cardiologist about her mitral valve disease and her shortness of breath and will keep reporting them to him and as he put it surgeries on the table.A slip was given for the new shingles vaccine

## 2018-06-05 DIAGNOSIS — J90 PLEURAL EFFUSION, NOT ELSEWHERE CLASSIFIED: ICD-10-CM

## 2018-06-05 PROCEDURE — 99223 1ST HOSP IP/OBS HIGH 75: CPT

## 2018-06-05 PROCEDURE — 71250 CT THORAX DX C-: CPT | Mod: 26

## 2018-06-05 PROCEDURE — 99235 HOSP IP/OBS SAME DATE MOD 70: CPT

## 2018-06-05 PROCEDURE — 74176 CT ABD & PELVIS W/O CONTRAST: CPT | Mod: 26

## 2018-06-05 PROCEDURE — 71045 X-RAY EXAM CHEST 1 VIEW: CPT | Mod: 26

## 2018-06-06 PROCEDURE — 99233 SBSQ HOSP IP/OBS HIGH 50: CPT

## 2018-06-06 PROCEDURE — 99232 SBSQ HOSP IP/OBS MODERATE 35: CPT

## 2018-06-07 PROCEDURE — 82150 ASSAY OF AMYLASE: CPT

## 2018-06-07 PROCEDURE — 87040 BLOOD CULTURE FOR BACTERIA: CPT

## 2018-06-07 PROCEDURE — 84436 ASSAY OF TOTAL THYROXINE: CPT

## 2018-06-07 PROCEDURE — 85730 THROMBOPLASTIN TIME PARTIAL: CPT

## 2018-06-07 PROCEDURE — 93005 ELECTROCARDIOGRAM TRACING: CPT

## 2018-06-07 PROCEDURE — 99285 EMERGENCY DEPT VISIT HI MDM: CPT | Mod: 25

## 2018-06-07 PROCEDURE — 83880 ASSAY OF NATRIURETIC PEPTIDE: CPT

## 2018-06-07 PROCEDURE — 82553 CREATINE MB FRACTION: CPT

## 2018-06-07 PROCEDURE — 84146 ASSAY OF PROLACTIN: CPT

## 2018-06-07 PROCEDURE — 80076 HEPATIC FUNCTION PANEL: CPT

## 2018-06-07 PROCEDURE — 83690 ASSAY OF LIPASE: CPT

## 2018-06-07 PROCEDURE — 74176 CT ABD & PELVIS W/O CONTRAST: CPT

## 2018-06-07 PROCEDURE — 80048 BASIC METABOLIC PNL TOTAL CA: CPT

## 2018-06-07 PROCEDURE — 99233 SBSQ HOSP IP/OBS HIGH 50: CPT

## 2018-06-07 PROCEDURE — 84439 ASSAY OF FREE THYROXINE: CPT

## 2018-06-07 PROCEDURE — 71250 CT THORAX DX C-: CPT

## 2018-06-07 PROCEDURE — 84480 ASSAY TRIIODOTHYRONINE (T3): CPT

## 2018-06-07 PROCEDURE — 84484 ASSAY OF TROPONIN QUANT: CPT

## 2018-06-07 PROCEDURE — 97162 PT EVAL MOD COMPLEX 30 MIN: CPT

## 2018-06-07 PROCEDURE — 83605 ASSAY OF LACTIC ACID: CPT

## 2018-06-07 PROCEDURE — 82550 ASSAY OF CK (CPK): CPT

## 2018-06-07 PROCEDURE — 71045 X-RAY EXAM CHEST 1 VIEW: CPT

## 2018-06-07 PROCEDURE — 85027 COMPLETE CBC AUTOMATED: CPT

## 2018-06-07 PROCEDURE — 99239 HOSP IP/OBS DSCHRG MGMT >30: CPT

## 2018-06-07 PROCEDURE — 96374 THER/PROPH/DIAG INJ IV PUSH: CPT

## 2018-06-07 PROCEDURE — 84443 ASSAY THYROID STIM HORMONE: CPT

## 2018-06-07 PROCEDURE — 96375 TX/PRO/DX INJ NEW DRUG ADDON: CPT

## 2018-06-07 PROCEDURE — 83735 ASSAY OF MAGNESIUM: CPT

## 2018-06-07 PROCEDURE — 85610 PROTHROMBIN TIME: CPT

## 2018-06-08 ENCOUNTER — APPOINTMENT (OUTPATIENT)
Dept: CARDIOLOGY | Facility: CLINIC | Age: 83
End: 2018-06-08

## 2018-06-08 ENCOUNTER — MEDICATION RENEWAL (OUTPATIENT)
Age: 83
End: 2018-06-08

## 2018-06-12 ENCOUNTER — APPOINTMENT (OUTPATIENT)
Dept: CARDIOLOGY | Facility: CLINIC | Age: 83
End: 2018-06-12
Payer: MEDICARE

## 2018-06-12 VITALS — DIASTOLIC BLOOD PRESSURE: 78 MMHG | HEART RATE: 92 BPM | OXYGEN SATURATION: 81 % | SYSTOLIC BLOOD PRESSURE: 110 MMHG

## 2018-06-12 PROCEDURE — 99211 OFF/OP EST MAY X REQ PHY/QHP: CPT

## 2018-06-12 PROCEDURE — 85610 PROTHROMBIN TIME: CPT | Mod: QW

## 2018-06-15 ENCOUNTER — EMERGENCY (EMERGENCY)
Facility: HOSPITAL | Age: 83
LOS: 1 days | Discharge: HOME | End: 2018-06-15
Admitting: EMERGENCY MEDICINE
Payer: MEDICARE

## 2018-06-15 DIAGNOSIS — Z86.69 PERSONAL HISTORY OF OTHER DISEASES OF THE NERVOUS SYSTEM AND SENSE ORGANS: Chronic | ICD-10-CM

## 2018-06-15 DIAGNOSIS — M84.372A STRESS FRACTURE, LEFT ANKLE, INITIAL ENCOUNTER FOR FRACTURE: Chronic | ICD-10-CM

## 2018-06-15 DIAGNOSIS — Z95.4 PRESENCE OF OTHER HEART-VALVE REPLACEMENT: Chronic | ICD-10-CM

## 2018-06-15 PROCEDURE — 99283 EMERGENCY DEPT VISIT LOW MDM: CPT

## 2018-06-18 ENCOUNTER — APPOINTMENT (OUTPATIENT)
Dept: CARDIOLOGY | Facility: CLINIC | Age: 83
End: 2018-06-18
Payer: MEDICARE

## 2018-06-18 DIAGNOSIS — I27.20 PULMONARY HYPERTENSION, UNSPECIFIED: ICD-10-CM

## 2018-06-18 DIAGNOSIS — Z79.01 LONG TERM (CURRENT) USE OF ANTICOAGULANTS: ICD-10-CM

## 2018-06-18 DIAGNOSIS — E78.5 HYPERLIPIDEMIA, UNSPECIFIED: ICD-10-CM

## 2018-06-18 DIAGNOSIS — D47.2 MONOCLONAL GAMMOPATHY: ICD-10-CM

## 2018-06-18 DIAGNOSIS — G43.909 MIGRAINE, UNSPECIFIED, NOT INTRACTABLE, WITHOUT STATUS MIGRAINOSUS: ICD-10-CM

## 2018-06-18 DIAGNOSIS — Z66 DO NOT RESUSCITATE: ICD-10-CM

## 2018-06-18 DIAGNOSIS — I48.2 CHRONIC ATRIAL FIBRILLATION: ICD-10-CM

## 2018-06-18 DIAGNOSIS — I34.0 NONRHEUMATIC MITRAL (VALVE) INSUFFICIENCY: ICD-10-CM

## 2018-06-18 DIAGNOSIS — Z91.041 RADIOGRAPHIC DYE ALLERGY STATUS: ICD-10-CM

## 2018-06-18 DIAGNOSIS — Z88.0 ALLERGY STATUS TO PENICILLIN: ICD-10-CM

## 2018-06-18 DIAGNOSIS — I25.10 ATHEROSCLEROTIC HEART DISEASE OF NATIVE CORONARY ARTERY WITHOUT ANGINA PECTORIS: ICD-10-CM

## 2018-06-18 DIAGNOSIS — Z85.820 PERSONAL HISTORY OF MALIGNANT MELANOMA OF SKIN: ICD-10-CM

## 2018-06-18 DIAGNOSIS — I10 ESSENTIAL (PRIMARY) HYPERTENSION: ICD-10-CM

## 2018-06-18 DIAGNOSIS — Z95.2 PRESENCE OF PROSTHETIC HEART VALVE: ICD-10-CM

## 2018-06-18 DIAGNOSIS — Z79.82 LONG TERM (CURRENT) USE OF ASPIRIN: ICD-10-CM

## 2018-06-18 DIAGNOSIS — I50.811 ACUTE RIGHT HEART FAILURE: ICD-10-CM

## 2018-06-18 LAB
INR PPP: 2.3 RATIO
POCT-PROTHROMBIN TIME: 27.3 SECS
QUALITY CONTROL: YES

## 2018-06-18 PROCEDURE — 99211 OFF/OP EST MAY X REQ PHY/QHP: CPT

## 2018-06-18 PROCEDURE — 85610 PROTHROMBIN TIME: CPT | Mod: QW

## 2018-06-19 LAB
INR PPP: 1.9 RATIO
POCT-PROTHROMBIN TIME: 23 SECS
QUALITY CONTROL: YES

## 2018-06-22 ENCOUNTER — APPOINTMENT (OUTPATIENT)
Dept: INTERNAL MEDICINE | Facility: CLINIC | Age: 83
End: 2018-06-22
Payer: MEDICARE

## 2018-06-22 VITALS
RESPIRATION RATE: 17 BRPM | HEIGHT: 68 IN | OXYGEN SATURATION: 97 % | WEIGHT: 166 LBS | SYSTOLIC BLOOD PRESSURE: 107 MMHG | BODY MASS INDEX: 25.16 KG/M2 | TEMPERATURE: 98 F | DIASTOLIC BLOOD PRESSURE: 72 MMHG | HEART RATE: 97 BPM

## 2018-06-22 DIAGNOSIS — J30.9 ALLERGIC RHINITIS, UNSPECIFIED: ICD-10-CM

## 2018-06-22 PROCEDURE — 99495 TRANSJ CARE MGMT MOD F2F 14D: CPT

## 2018-06-22 NOTE — PHYSICAL EXAM
[No Acute Distress] : no acute distress [Well Nourished] : well nourished [Well Developed] : well developed [Well-Appearing] : well-appearing [Normal Voice/Communication] : normal voice/communication [Normal Sclera/Conjunctiva] : normal sclera/conjunctiva [PERRL] : pupils equal round and reactive to light [EOMI] : extraocular movements intact [Normal Outer Ear/Nose] : the outer ears and nose were normal in appearance [Normal Oropharynx] : the oropharynx was normal [Normal TMs] : both tympanic membranes were normal [Normal Nasal Mucosa] : the nasal mucosa was normal [No JVD] : no jugular venous distention [Supple] : supple [No Lymphadenopathy] : no lymphadenopathy [Thyroid Normal, No Nodules] : the thyroid was normal and there were no nodules present [No Respiratory Distress] : no respiratory distress  [Clear to Auscultation] : lungs were clear to auscultation bilaterally [No Accessory Muscle Use] : no accessory muscle use [Normal Rate] : normal rate  [Regular Rhythm] : with a regular rhythm [Normal S1, S2] : normal S1 and S2 [No Murmur] : no murmur heard [No Carotid Bruits] : no carotid bruits [No Abdominal Bruit] : a ~M bruit was not heard ~T in the abdomen [No Varicosities] : no varicosities [Pedal Pulses Present] : the pedal pulses are present [No Edema] : there was no peripheral edema [No Extremity Clubbing/Cyanosis] : no extremity clubbing/cyanosis [No Palpable Aorta] : no palpable aorta [Soft] : abdomen soft [Non Tender] : non-tender [Non-distended] : non-distended [No Masses] : no abdominal mass palpated [No HSM] : no HSM [Normal Bowel Sounds] : normal bowel sounds [Declined Rectal Exam] : declined rectal exam [Normal Supraclavicular Nodes] : no supraclavicular lymphadenopathy [Normal Axillary Nodes] : no axillary lymphadenopathy [Normal Posterior Cervical Nodes] : no posterior cervical lymphadenopathy [Normal Femoral Nodes] : no femoral lymphadenopathy [Normal Anterior Cervical Nodes] : no anterior cervical lymphadenopathy [Normal Inguinal Nodes] : no inguinal lymphadenopathy [No CVA Tenderness] : no CVA  tenderness [No Spinal Tenderness] : no spinal tenderness [No Joint Swelling] : no joint swelling [Grossly Normal Strength/Tone] : grossly normal strength/tone [No Rash] : no rash [No Skin Lesions] : no skin lesions [Normal Gait] : normal gait [Coordination Grossly Intact] : coordination grossly intact [No Focal Deficits] : no focal deficits [Deep Tendon Reflexes (DTR)] : deep tendon reflexes were 2+ and symmetric [Speech Grossly Normal] : speech grossly normal [Memory Grossly Normal] : memory grossly normal [Normal Affect] : the affect was normal [Normal Mood] : the mood was normal [Normal Insight/Judgement] : insight and judgment were intact [Declined Breast Exam] : declined breast exam  [No Hernias] : no hernias [Kyphosis] : no kyphosis [Scoliosis] : no scoliosis [Acne] : no acne

## 2018-06-22 NOTE — REVIEW OF SYSTEMS
[Negative] : Heme/Lymph [Fever] : no fever [Chills] : no chills [Fatigue] : no fatigue [Hot Flashes] : no hot flashes [Night Sweats] : no night sweats [Recent Change In Weight] : ~T no recent weight change [Discharge] : no discharge [Pain] : no pain [Redness] : no redness [Dryness] : no dryness  [Vision Problems] : no vision problems [Itching] : no itching [Earache] : no earache [Hearing Loss] : no hearing loss [Nosebleed] : no nosebleeds [Hoarseness] : no hoarseness [Nasal Discharge] : no nasal discharge [Sore Throat] : no sore throat [Postnasal Drip] : no postnasal drip [Chest Pain] : no chest pain [Palpitations] : no palpitations [Leg Claudication] : no leg claudication [Lower Ext Edema] : no lower extremity edema [Orthopnea] : no orthopnea [Paroysmal Nocturnal Dyspnea] : no paroysmal nocturnal dyspnea [Shortness Of Breath] : no shortness of breath [Wheezing] : no wheezing [Cough] : no cough [Dyspnea on Exertion] : no dyspnea on exertion [Abdominal Pain] : no abdominal pain [Nausea] : no nausea [Constipation] : no constipation [Diarrhea] : diarrhea [Vomiting] : no vomiting [Heartburn] : no heartburn [Melena] : no melena [Dysuria] : no dysuria [Incontinence] : no incontinence [Nocturia] : no nocturia [Poor Libido] : libido not poor [Hematuria] : no hematuria [Frequency] : no frequency [Vaginal Discharge] : no vaginal discharge [Dysmenorrhea] : no dysmenorrhea [Joint Pain] : no joint pain [Joint Stiffness] : no joint stiffness [Joint Swelling] : no joint swelling [Muscle Weakness] : no muscle weakness [Muscle Pain] : no muscle pain [Back Pain] : no back pain [Mole Changes] : no mole changes [Nail Changes] : no nail changes [Hair Changes] : no hair changes [Skin Rash] : no skin rash

## 2018-06-22 NOTE — ASSESSMENT
[FreeTextEntry1] : Physical exam shows a well-developed female in no acute distress blood pressure was 107/72 her temperature was 98°F orally pulse was irregular irregular between  respiratory rate was 15 HEENT was unremarkable chest was clear cardiovascular was irregularly irregular with a 2/6 systolic murmur abdomen soft and nontender extremities she bilateral edema wound bandage on her left lower extremity neurologic exam was nonfocal reports from visiting nurses suggested significant improvement in the healing of this flap laceration to monitor and inform me of his progress the need arises they have been given the name of the wound surgeon to contact patient's INR is to be followed cardiologist last one was 2.2 she is up-to-date on her ophthalmologist visits

## 2018-06-22 NOTE — HISTORY OF PRESENT ILLNESS
[Discharged on ___] : discharged on [unfilled] [Post-hospitalization from ___ Hospital] : Post-hospitalization from [unfilled] Hospital [FreeTextEntry2] : 89-year-old woman with a history of MGUS mitral regurg allergic rhinitis hyperlipidemia chronic diastolic congestive heart failure with leg edema atrial fibrillation on anticoagulation recent problems with nosebleeds and a flap laceration of her left lower extremity she denies temperature chills sweats or myalgias he was recently in the emergency room for the laceration of the leg she is being seen by home care nurses have been providing care there is no evidence of infection and things are proceeding along nicely she denies chest pain shortness of breath palpitations lightheadedness or syncope denies abdominal pain nausea vomiting diarrhea she has been constipated and she said no rectal bleeding her appetite has been poor but she is maintaining her weight by eating frequent small meals

## 2018-06-22 NOTE — HEALTH RISK ASSESSMENT
[No falls in past year] : Patient reported no falls in the past year [0] : 2) Feeling down, depressed, or hopeless: Not at all (0) [[id number]] : 0

## 2018-06-27 ENCOUNTER — APPOINTMENT (OUTPATIENT)
Dept: SURGERY | Facility: CLINIC | Age: 83
End: 2018-06-27
Payer: MEDICARE

## 2018-06-27 DIAGNOSIS — I47.1 SUPRAVENTRICULAR TACHYCARDIA: ICD-10-CM

## 2018-06-27 DIAGNOSIS — S81.812A LACERATION W/OUT FOREIGN BODY, LEFT LOWER LEG, INITIAL ENCOUNTER: ICD-10-CM

## 2018-06-27 PROCEDURE — 99213 OFFICE O/P EST LOW 20 MIN: CPT

## 2018-06-28 ENCOUNTER — NON-APPOINTMENT (OUTPATIENT)
Age: 83
End: 2018-06-28

## 2018-06-28 ENCOUNTER — APPOINTMENT (OUTPATIENT)
Dept: CARDIOLOGY | Facility: CLINIC | Age: 83
End: 2018-06-28
Payer: MEDICARE

## 2018-06-28 VITALS
BODY MASS INDEX: 25.16 KG/M2 | RESPIRATION RATE: 17 BRPM | HEIGHT: 68 IN | DIASTOLIC BLOOD PRESSURE: 66 MMHG | HEART RATE: 85 BPM | WEIGHT: 166 LBS | SYSTOLIC BLOOD PRESSURE: 98 MMHG | OXYGEN SATURATION: 92 %

## 2018-06-28 VITALS — SYSTOLIC BLOOD PRESSURE: 120 MMHG | DIASTOLIC BLOOD PRESSURE: 79 MMHG

## 2018-06-28 PROBLEM — S81.812A LACERATION OF LEFT LOWER EXTREMITY, INITIAL ENCOUNTER: Status: ACTIVE | Noted: 2018-06-28

## 2018-06-28 LAB
INR PPP: 3 RATIO
POCT-PROTHROMBIN TIME: 36.6 SECS
QUALITY CONTROL: YES

## 2018-06-28 PROCEDURE — 93000 ELECTROCARDIOGRAM COMPLETE: CPT

## 2018-06-28 PROCEDURE — 99214 OFFICE O/P EST MOD 30 MIN: CPT

## 2018-06-28 PROCEDURE — 85610 PROTHROMBIN TIME: CPT | Mod: QW

## 2018-07-02 ENCOUNTER — APPOINTMENT (OUTPATIENT)
Dept: CARDIOLOGY | Facility: CLINIC | Age: 83
End: 2018-07-02

## 2018-07-09 ENCOUNTER — APPOINTMENT (OUTPATIENT)
Dept: SURGERY | Facility: CLINIC | Age: 83
End: 2018-07-09
Payer: MEDICARE

## 2018-07-09 DIAGNOSIS — Z86.19 PERSONAL HISTORY OF OTHER INFECTIOUS AND PARASITIC DISEASES: ICD-10-CM

## 2018-07-09 DIAGNOSIS — Z95.4 PRESENCE OF OTHER HEART-VALVE REPLACEMENT: ICD-10-CM

## 2018-07-09 PROCEDURE — 99213 OFFICE O/P EST LOW 20 MIN: CPT

## 2018-07-10 PROBLEM — Z86.19 HISTORY OF HERPES ZOSTER: Status: RESOLVED | Noted: 2017-08-01 | Resolved: 2018-07-10

## 2018-07-10 PROBLEM — Z95.4 S/P AORTIC VALVE REPLACEMENT WITH METALLIC VALVE: Status: RESOLVED | Noted: 2018-02-01 | Resolved: 2018-07-10

## 2018-07-19 ENCOUNTER — APPOINTMENT (OUTPATIENT)
Dept: CARDIOLOGY | Facility: CLINIC | Age: 83
End: 2018-07-19
Payer: MEDICARE

## 2018-07-19 ENCOUNTER — OTHER (OUTPATIENT)
Age: 83
End: 2018-07-19

## 2018-07-19 ENCOUNTER — NON-APPOINTMENT (OUTPATIENT)
Age: 83
End: 2018-07-19

## 2018-07-19 VITALS
BODY MASS INDEX: 24.86 KG/M2 | WEIGHT: 164 LBS | RESPIRATION RATE: 15 BRPM | OXYGEN SATURATION: 97 % | SYSTOLIC BLOOD PRESSURE: 109 MMHG | HEART RATE: 85 BPM | DIASTOLIC BLOOD PRESSURE: 75 MMHG | HEIGHT: 68 IN

## 2018-07-19 PROBLEM — I35.9 NONRHEUMATIC AORTIC VALVE DISORDER, UNSPECIFIED: Chronic | Status: ACTIVE | Noted: 2018-02-06

## 2018-07-19 LAB
INR PPP: 3.9 RATIO
POCT-PROTHROMBIN TIME: 47.1 SECS
QUALITY CONTROL: YES

## 2018-07-19 PROCEDURE — 93000 ELECTROCARDIOGRAM COMPLETE: CPT

## 2018-07-19 PROCEDURE — 99214 OFFICE O/P EST MOD 30 MIN: CPT

## 2018-07-19 PROCEDURE — 85610 PROTHROMBIN TIME: CPT | Mod: QW

## 2018-07-30 ENCOUNTER — APPOINTMENT (OUTPATIENT)
Dept: ORTHOPEDIC SURGERY | Facility: CLINIC | Age: 83
End: 2018-07-30
Payer: MEDICARE

## 2018-07-30 VITALS
HEIGHT: 68 IN | DIASTOLIC BLOOD PRESSURE: 78 MMHG | WEIGHT: 163 LBS | HEART RATE: 94 BPM | SYSTOLIC BLOOD PRESSURE: 116 MMHG | BODY MASS INDEX: 24.71 KG/M2

## 2018-07-30 DIAGNOSIS — M19.019 PRIMARY OSTEOARTHRITIS, UNSPECIFIED SHOULDER: ICD-10-CM

## 2018-07-30 PROCEDURE — 20610 DRAIN/INJ JOINT/BURSA W/O US: CPT | Mod: RT

## 2018-07-30 PROCEDURE — 99204 OFFICE O/P NEW MOD 45 MIN: CPT | Mod: 25

## 2018-08-01 ENCOUNTER — APPOINTMENT (OUTPATIENT)
Dept: SURGERY | Facility: CLINIC | Age: 83
End: 2018-08-01
Payer: MEDICARE

## 2018-08-01 PROCEDURE — 99213 OFFICE O/P EST LOW 20 MIN: CPT

## 2018-08-02 ENCOUNTER — APPOINTMENT (OUTPATIENT)
Dept: CARDIOLOGY | Facility: CLINIC | Age: 83
End: 2018-08-02
Payer: MEDICARE

## 2018-08-02 VITALS — OXYGEN SATURATION: 98 % | DIASTOLIC BLOOD PRESSURE: 75 MMHG | SYSTOLIC BLOOD PRESSURE: 111 MMHG | HEART RATE: 97 BPM

## 2018-08-02 LAB
INR PPP: 3.8 RATIO
POCT-PROTHROMBIN TIME: 45.2 SECS
QUALITY CONTROL: YES

## 2018-08-02 PROCEDURE — 93793 ANTICOAG MGMT PT WARFARIN: CPT

## 2018-08-02 PROCEDURE — 85610 PROTHROMBIN TIME: CPT | Mod: QW

## 2018-08-15 ENCOUNTER — NON-APPOINTMENT (OUTPATIENT)
Age: 83
End: 2018-08-15

## 2018-08-15 ENCOUNTER — APPOINTMENT (OUTPATIENT)
Dept: INTERNAL MEDICINE | Facility: CLINIC | Age: 83
End: 2018-08-15
Payer: MEDICARE

## 2018-08-15 ENCOUNTER — EMERGENCY (EMERGENCY)
Facility: HOSPITAL | Age: 83
LOS: 1 days | Discharge: HOME | End: 2018-08-15
Attending: EMERGENCY MEDICINE | Admitting: EMERGENCY MEDICINE
Payer: MEDICARE

## 2018-08-15 VITALS
HEART RATE: 99 BPM | RESPIRATION RATE: 20 BRPM | WEIGHT: 160.06 LBS | HEIGHT: 68 IN | SYSTOLIC BLOOD PRESSURE: 122 MMHG | TEMPERATURE: 98 F | DIASTOLIC BLOOD PRESSURE: 73 MMHG | OXYGEN SATURATION: 96 %

## 2018-08-15 VITALS
OXYGEN SATURATION: 95 % | HEART RATE: 97 BPM | TEMPERATURE: 99 F | DIASTOLIC BLOOD PRESSURE: 68 MMHG | SYSTOLIC BLOOD PRESSURE: 103 MMHG | RESPIRATION RATE: 18 BRPM

## 2018-08-15 VITALS
TEMPERATURE: 98 F | HEART RATE: 102 BPM | DIASTOLIC BLOOD PRESSURE: 60 MMHG | RESPIRATION RATE: 16 BRPM | HEIGHT: 68 IN | SYSTOLIC BLOOD PRESSURE: 104 MMHG | OXYGEN SATURATION: 96 % | WEIGHT: 160 LBS | BODY MASS INDEX: 24.25 KG/M2

## 2018-08-15 DIAGNOSIS — Z87.39 PERSONAL HISTORY OF OTHER DISEASES OF THE MUSCULOSKELETAL SYSTEM AND CONNECTIVE TISSUE: ICD-10-CM

## 2018-08-15 DIAGNOSIS — Z91.09 OTHER ALLERGY STATUS, OTHER THAN TO DRUGS AND BIOLOGICAL SUBSTANCES: ICD-10-CM

## 2018-08-15 DIAGNOSIS — Z86.69 PERSONAL HISTORY OF OTHER DISEASES OF THE NERVOUS SYSTEM AND SENSE ORGANS: Chronic | ICD-10-CM

## 2018-08-15 DIAGNOSIS — Z95.4 PRESENCE OF OTHER HEART-VALVE REPLACEMENT: Chronic | ICD-10-CM

## 2018-08-15 DIAGNOSIS — M84.372A STRESS FRACTURE, LEFT ANKLE, INITIAL ENCOUNTER FOR FRACTURE: Chronic | ICD-10-CM

## 2018-08-15 LAB
ALBUMIN SERPL ELPH-MCNC: 3 G/DL — LOW (ref 3.3–5)
ALP SERPL-CCNC: 129 U/L — HIGH (ref 40–120)
ALT FLD-CCNC: 40 U/L DA — SIGNIFICANT CHANGE UP (ref 10–45)
ANION GAP SERPL CALC-SCNC: 7 MMOL/L — SIGNIFICANT CHANGE UP (ref 5–17)
APTT BLD: 42.2 SEC — HIGH (ref 27.5–37.4)
AST SERPL-CCNC: 37 U/L — SIGNIFICANT CHANGE UP (ref 10–40)
BASOPHILS # BLD AUTO: 0.1 K/UL — SIGNIFICANT CHANGE UP (ref 0–0.2)
BASOPHILS NFR BLD AUTO: 1.2 % — SIGNIFICANT CHANGE UP (ref 0–2)
BILIRUB SERPL-MCNC: 1.3 MG/DL — HIGH (ref 0.2–1.2)
BUN SERPL-MCNC: 35 MG/DL — HIGH (ref 7–23)
CALCIUM SERPL-MCNC: 9.2 MG/DL — SIGNIFICANT CHANGE UP (ref 8.4–10.5)
CHLORIDE SERPL-SCNC: 107 MMOL/L — SIGNIFICANT CHANGE UP (ref 96–108)
CO2 SERPL-SCNC: 27 MMOL/L — SIGNIFICANT CHANGE UP (ref 22–31)
CREAT SERPL-MCNC: 1.43 MG/DL — HIGH (ref 0.5–1.3)
EOSINOPHIL # BLD AUTO: 0.1 K/UL — SIGNIFICANT CHANGE UP (ref 0–0.5)
EOSINOPHIL NFR BLD AUTO: 1.1 % — SIGNIFICANT CHANGE UP (ref 0–6)
GLUCOSE SERPL-MCNC: 110 MG/DL — HIGH (ref 70–99)
HCT VFR BLD CALC: 45.8 % — HIGH (ref 34.5–45)
HGB BLD-MCNC: 14.1 G/DL — SIGNIFICANT CHANGE UP (ref 11.5–15.5)
INR BLD: 2.7 RATIO — HIGH (ref 0.88–1.16)
LYMPHOCYTES # BLD AUTO: 0.7 K/UL — LOW (ref 1–3.3)
LYMPHOCYTES # BLD AUTO: 11.6 % — LOW (ref 13–44)
MCHC RBC-ENTMCNC: 30.1 PG — SIGNIFICANT CHANGE UP (ref 27–34)
MCHC RBC-ENTMCNC: 30.8 GM/DL — LOW (ref 32–36)
MCV RBC AUTO: 97.7 FL — SIGNIFICANT CHANGE UP (ref 80–100)
MONOCYTES # BLD AUTO: 0.7 K/UL — SIGNIFICANT CHANGE UP (ref 0–0.9)
MONOCYTES NFR BLD AUTO: 11.4 % — HIGH (ref 1–9)
NEUTROPHILS # BLD AUTO: 4.6 K/UL — SIGNIFICANT CHANGE UP (ref 1.8–7.4)
NEUTROPHILS NFR BLD AUTO: 74.7 % — SIGNIFICANT CHANGE UP (ref 43–77)
PLATELET # BLD AUTO: 196 K/UL — SIGNIFICANT CHANGE UP (ref 150–400)
POTASSIUM SERPL-MCNC: 4.1 MMOL/L — SIGNIFICANT CHANGE UP (ref 3.5–5.3)
POTASSIUM SERPL-SCNC: 4.1 MMOL/L — SIGNIFICANT CHANGE UP (ref 3.5–5.3)
PROT SERPL-MCNC: 7 G/DL — SIGNIFICANT CHANGE UP (ref 6–8.3)
PROTHROM AB SERPL-ACNC: 30.6 SEC — HIGH (ref 9.8–12.7)
RBC # BLD: 4.69 M/UL — SIGNIFICANT CHANGE UP (ref 3.8–5.2)
RBC # FLD: 16.7 % — HIGH (ref 10.3–14.5)
SODIUM SERPL-SCNC: 141 MMOL/L — SIGNIFICANT CHANGE UP (ref 135–145)
WBC # BLD: 6.1 K/UL — SIGNIFICANT CHANGE UP (ref 3.8–10.5)
WBC # FLD AUTO: 6.1 K/UL — SIGNIFICANT CHANGE UP (ref 3.8–10.5)

## 2018-08-15 PROCEDURE — 99284 EMERGENCY DEPT VISIT MOD MDM: CPT

## 2018-08-15 PROCEDURE — 85610 PROTHROMBIN TIME: CPT

## 2018-08-15 PROCEDURE — 85027 COMPLETE CBC AUTOMATED: CPT

## 2018-08-15 PROCEDURE — 93010 ELECTROCARDIOGRAM REPORT: CPT

## 2018-08-15 PROCEDURE — 80053 COMPREHEN METABOLIC PANEL: CPT

## 2018-08-15 PROCEDURE — 99283 EMERGENCY DEPT VISIT LOW MDM: CPT

## 2018-08-15 PROCEDURE — 99215 OFFICE O/P EST HI 40 MIN: CPT

## 2018-08-15 PROCEDURE — 93005 ELECTROCARDIOGRAM TRACING: CPT

## 2018-08-15 PROCEDURE — 85730 THROMBOPLASTIN TIME PARTIAL: CPT

## 2018-08-15 PROCEDURE — 99285 EMERGENCY DEPT VISIT HI MDM: CPT

## 2018-08-15 RX ORDER — SODIUM CHLORIDE 9 MG/ML
3 INJECTION INTRAMUSCULAR; INTRAVENOUS; SUBCUTANEOUS ONCE
Qty: 0 | Refills: 0 | Status: COMPLETED | OUTPATIENT
Start: 2018-08-15 | End: 2018-08-15

## 2018-08-15 RX ORDER — DIGOXIN 250 MCG
0.12 TABLET ORAL DAILY
Qty: 0 | Refills: 0 | Status: DISCONTINUED | OUTPATIENT
Start: 2018-08-15 | End: 2018-08-15

## 2018-08-15 RX ORDER — FLUTICASONE PROPIONATE 50 UG/1
50 SPRAY, METERED NASAL
Refills: 0 | Status: DISCONTINUED | COMMUNITY
End: 2018-08-15

## 2018-08-15 RX ORDER — GENTAMICIN SULFATE 1 MG/G
0.1 CREAM TOPICAL
Qty: 15 | Refills: 0 | Status: DISCONTINUED | COMMUNITY
Start: 2018-05-01 | End: 2018-08-15

## 2018-08-15 RX ORDER — SUCRALFATE 1 G/1
1 TABLET ORAL DAILY
Refills: 0 | Status: DISCONTINUED | COMMUNITY
Start: 2018-06-13 | End: 2018-08-15

## 2018-08-15 RX ORDER — BUDESONIDE AND FORMOTEROL FUMARATE DIHYDRATE 160; 4.5 UG/1; UG/1
160-4.5 AEROSOL RESPIRATORY (INHALATION)
Qty: 10 | Refills: 0 | Status: DISCONTINUED | COMMUNITY
Start: 2018-03-08 | End: 2018-08-15

## 2018-08-15 RX ORDER — DIGOXIN 250 MCG
1 TABLET ORAL
Qty: 7 | Refills: 0
Start: 2018-08-15 | End: 2018-08-28

## 2018-08-15 RX ORDER — TRIAMCINOLONE ACETONIDE 55 UG/1
55 SPRAY, METERED NASAL DAILY
Refills: 0 | Status: ACTIVE | COMMUNITY
Start: 2018-08-15

## 2018-08-15 RX ORDER — DIGOXIN 250 MCG
0.25 TABLET ORAL DAILY
Qty: 0 | Refills: 0 | Status: DISCONTINUED | OUTPATIENT
Start: 2018-08-15 | End: 2018-08-19

## 2018-08-15 RX ORDER — PANTOPRAZOLE 40 MG/1
40 TABLET, DELAYED RELEASE ORAL
Qty: 90 | Refills: 0 | Status: DISCONTINUED | COMMUNITY
Start: 2018-06-13 | End: 2018-08-15

## 2018-08-15 RX ORDER — METOPROLOL SUCCINATE 100 MG/1
100 TABLET, EXTENDED RELEASE ORAL
Qty: 90 | Refills: 0 | Status: DISCONTINUED | COMMUNITY
Start: 2018-06-13 | End: 2018-08-15

## 2018-08-15 RX ORDER — FERROUS SULFATE 325(65) MG
325 (65 FE) TABLET ORAL
Refills: 0 | Status: DISCONTINUED | COMMUNITY
End: 2018-08-15

## 2018-08-15 RX ADMIN — SODIUM CHLORIDE 3 MILLILITER(S): 9 INJECTION INTRAMUSCULAR; INTRAVENOUS; SUBCUTANEOUS at 17:20

## 2018-08-15 RX ADMIN — Medication 0.25 MILLIGRAM(S): at 19:01

## 2018-08-15 NOTE — ASSESSMENT
[FreeTextEntry1] : Physical exam shows an elderly woman in no acute distress blood pressure 104/60 height 5 foot 8 inches weight 160 pounds heart rate between 100 and 105 irregularly irregular respiratory rate was 16 HEENT was unremarkable conjunctiva were normal sclera are nonicteric chest is clear cardiovascular exam showed an irregularly irregular pulse with a rapid ventricular response study showed bilateral edema neurologic nonfocal discuss with cardiology Dr Marshall . We will send her to the emergency room for immediate monitoring and blood testing recently in the past months her metoprolol succinate was reduced from 200 mg a day to 100 Mg a day. If bloods allow will consider the addition of digoxin to her regimen

## 2018-08-15 NOTE — ED PROVIDER NOTE - OBJECTIVE STATEMENT
89 year old F with known mitral stenosis presents with ferguson and palpitations from her [facility] office.  Pt denies acute shortness of breath or chest pain.

## 2018-08-15 NOTE — CONSULT NOTE ADULT - SUBJECTIVE AND OBJECTIVE BOX
[facility] Consultation    CHIEF COMPLAINT: Patient is a 89y old  Female who presents with a chief complaint of dyspnea.      HPI  She has been in her usual state of fair health until yesterday when she felt very dyspneic when she bent down.  This was associated with dyspnea.  She has been feeling dyspneic when she walks a moderate distance - possibly this has been worsening over the past few weeks.  No complaints of chest pain or chest pressure. Chronic LE edema. No orthopnea. No syncope or near syncope.       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  H/O aortic valve replacement: 2000-mechanical valve-on Coumadin      SOCIAL HISTORY: not a smoker     FAMILY HISTORY: FAMILY HISTORY:  No significant family history      MEDICATIONS  (STANDING):  sodium chloride 0.9% lock flush 3 milliLiter(s) IV Push once    MEDICATIONS  (PRN):      Allergies    chlorhexidine containing compounds (Unknown)  Originally Entered as [Unknown] reaction to [WIPES] (Unknown)  penicillin (Faint)  AMADO Chlorhexidine wipes (Hives; Rash)      REVIEW OF SYSTEMS:    CONSTITUTIONAL:  feels weak  EYES: No visual changes, No diplopia  ENMT: No throat pain , No exudate  NECK: No pain or stiffness  RESPIRATORY: No cough, wheezing, hemoptysis; dyspnea   CARDIOVASCULAR: No chest pain or chest pressure.  exertion.  No palpitations, dizziness, light headedness, syncope or near syncope.  No edema, no orthopnea.   GASTROINTESTINAL: No abdominal pain. No nausea, vomiting, or hematemesis; No diarrhea or constipation. No melena or hematochezia.  GENITOURINARY: No dysuria, frequency or hematuria  NEUROLOGICAL: No numbness or weakness  SKIN: No itching or rash  All other review of systems is negative unless indicated above    VITAL SIGNS:   Vital Signs Last 24 Hrs  T(C): 36.4 (15 Aug 2018 15:46), Max: 36.4 (15 Aug 2018 15:46)  T(F): 97.5 (15 Aug 2018 15:46), Max: 97.5 (15 Aug 2018 15:46)  HR: 99 (15 Aug 2018 15:46) (99 - 99)  BP: 122/73 (15 Aug 2018 15:46) (122/73 - 122/73)  BP(mean): --  RR: 20 (15 Aug 2018 15:46) (20 - 20)  SpO2: 96% (15 Aug 2018 15:46) (96% - 96%)    I&O's Summary      PHYSICAL EXAM:    Constitutional: NAD, awake and alert, well-developed  Eyes:  EOMI,  Pupils round, no lesions  ENMT: no exudate or erythema  Pulmonary: Non-labored, breath sounds are clear bilaterally, No wheezing, rales or rhonchi  Cardiovascular: irregular S1, S2. click. II/VI systolic murmur  Gastrointestinal: Bowel Sounds present, soft, nontender.   Lymph: No cervical lymphadenopathy.  Neurological: Alert, no focal deficits  Skin: No rashes. Changes of chronic venous stasis. No cyanosis.  Psych:  Mood & affect appropriate    Labs - not yet drawn.  EKG AF, moderate rate. non specific ST change       < from: Transesophageal Echocardiogram (02.06.18 @ 11:39) >  1. Repaired valve, MitraClip.  Mild mitral regurgitation.  Mean transmitral valve gradient equals 9 mm Hg with heart  rate of 69 bpm, which is elevated even in the setting of a  repaired valve.  2. Mechanical aortic valve prosthesis.The valve is well  seated.  Peak transaortic valve gradient equals 25 mm Hg,  mean transaortic valve gradient equals 13 mm Hg, which is  probably normal in the presence of a mechanical aortic  valve prosthesis. No aortic valve regurgitation seen.  3. Severely dilated left atrium.  LA volume index = 51  cc/m2.   Left atrial appendage thrombus seen.   Decreased  left atrial appendage velocities noted.  4. Increased relative wall thickness with normal left  ventricular mass index, consistent with concentric left  ventricular remodeling.  5.  Normal  left ventricular systolic function. No  segmental wall motion abnormalities. Septal motion  consistent with right ventricular overload.  6. Severe right atrial enlargement.  7. Right ventricular enlargement with decreased right  ventricular systolic function.  8. Normal tricuspid valve.  Dilated tricuspid annulus.  Severe tricuspid regurgitation.  9. Estimated pulmonary artery systolic pressure equals 82  mm Hg, assuming right atrial pressureequals 10 - 15 mm Hg,  consistent with severe pulmonary pressures.  10. Color Doppler demonstrates evidence of a patent foramen  ovale.    < end of copied text >

## 2018-08-15 NOTE — ED ADULT TRIAGE NOTE - CHIEF COMPLAINT QUOTE
Sent to ED from [facility] office for blood test. Pt c/o palpitation. Hx Afib. Denies chest pain, dizziness.

## 2018-08-15 NOTE — ED ADULT NURSE NOTE - NSIMPLEMENTINTERV_GEN_ALL_ED
Implemented All Fall Risk Interventions:  Helen to call system. Call bell, personal items and telephone within reach. Instruct patient to call for assistance. Room bathroom lighting operational. Non-slip footwear when patient is off stretcher. Physically safe environment: no spills, clutter or unnecessary equipment. Stretcher in lowest position, wheels locked, appropriate side rails in place. Provide visual cue, wrist band, yellow gown, etc. Monitor gait and stability. Monitor for mental status changes and reorient to person, place, and time. Review medications for side effects contributing to fall risk. Reinforce activity limits and safety measures with patient and family.

## 2018-08-15 NOTE — PHYSICAL EXAM
[No Acute Distress] : no acute distress [Well Nourished] : well nourished [Well Developed] : well developed [Well-Appearing] : well-appearing [Normal Voice/Communication] : normal voice/communication [Normal Sclera/Conjunctiva] : normal sclera/conjunctiva [PERRL] : pupils equal round and reactive to light [EOMI] : extraocular movements intact [Normal Outer Ear/Nose] : the outer ears and nose were normal in appearance [Normal Oropharynx] : the oropharynx was normal [Normal TMs] : both tympanic membranes were normal [Normal Nasal Mucosa] : the nasal mucosa was normal [No JVD] : no jugular venous distention [Supple] : supple [No Lymphadenopathy] : no lymphadenopathy [Thyroid Normal, No Nodules] : the thyroid was normal and there were no nodules present [No Respiratory Distress] : no respiratory distress  [Clear to Auscultation] : lungs were clear to auscultation bilaterally [No Accessory Muscle Use] : no accessory muscle use [Normal Percussion] : the chest was normal to percussion [Normal Rate] : normal rate  [Regular Rhythm] : with a regular rhythm [Normal S1, S2] : normal S1 and S2 [No Murmur] : no murmur heard [No Carotid Bruits] : no carotid bruits [No Abdominal Bruit] : a ~M bruit was not heard ~T in the abdomen [No Varicosities] : no varicosities [Pedal Pulses Present] : the pedal pulses are present [No Edema] : there was no peripheral edema [No Extremity Clubbing/Cyanosis] : no extremity clubbing/cyanosis [No Palpable Aorta] : no palpable aorta [Declined Breast Exam] : declined breast exam  [Soft] : abdomen soft [Non Tender] : non-tender [Non-distended] : non-distended [No Masses] : no abdominal mass palpated [No HSM] : no HSM [Normal Bowel Sounds] : normal bowel sounds [No Hernias] : no hernias [Declined Rectal Exam] : declined rectal exam [Normal Posterior Cervical Nodes] : no posterior cervical lymphadenopathy [Normal Anterior Cervical Nodes] : no anterior cervical lymphadenopathy [No CVA Tenderness] : no CVA  tenderness [No Spinal Tenderness] : no spinal tenderness [No Joint Swelling] : no joint swelling [Grossly Normal Strength/Tone] : grossly normal strength/tone [No Rash] : no rash [Normal Gait] : normal gait [Coordination Grossly Intact] : coordination grossly intact [No Focal Deficits] : no focal deficits [Deep Tendon Reflexes (DTR)] : deep tendon reflexes were 2+ and symmetric [Normal Affect] : the affect was normal [Normal Insight/Judgement] : insight and judgment were intact

## 2018-08-15 NOTE — CONSULT NOTE ADULT - ASSESSMENT
89 year old woman referred from primary physician's office because of tachycardia and ALMONTE.  She currently appears comfortable and is hemodynamically stable.  She has a complex cardiac history including valvular heart disease, s/p remote mechanical AVR, mitral valve disease, s/p MitraClip which has resulted in severe functional mitral stenosis. permanent AF. longterm anticoagulation with warfarin.  The following is recommended.    Plan  1. Follow up bloodwork.  2. Consider starting digoxin for more optimal heart rate control... this will be beneficial in setting of severe MS. Increasing dose of metoprolol can be considered, however, she was noted to have a lower BP in primary MD's office earlier today.  3. If bloodwork stable and patient feeling better... no objection to outpatient follow up in office with Dr. Leon, her primary cardiologist

## 2018-08-15 NOTE — HISTORY OF PRESENT ILLNESS
[FreeTextEntry1] : Comes to the office for followup and medications discussed her overall health of note over the past several days she has had periodic episodes of pounding rapid heart rates [de-identified] : 89-year-old woman with a history of atrial fibrillation chronic diastolic congestive heart failure carotid and coronary artery disease hypertension and anemia comes to the office after several episodes of pounding in her chest the episode yesterday lasted 5-10 minutes and had her feel like she was about to die today's episode was less severe and seems to be elicited by changing her body position. Her resting heart rate she says is somewhere around 100 she denies chest pain diaphoresis nausea or vomiting she denies shortness of breath she is short of breath with exertion he denies abdominal pain nausea vomiting diarrhea or constipation rectal bleeding or melena

## 2018-08-15 NOTE — REVIEW OF SYSTEMS
[Fatigue] : fatigue [Palpitations] : palpitations [Nocturia] : nocturia [Negative] : Heme/Lymph [Fever] : no fever [Chills] : no chills [Hot Flashes] : no hot flashes [Night Sweats] : no night sweats [Recent Change In Weight] : ~T no recent weight change [Discharge] : no discharge [Pain] : no pain [Redness] : no redness [Dryness] : no dryness  [Vision Problems] : no vision problems [Itching] : no itching [Earache] : no earache [Hearing Loss] : no hearing loss [Nosebleed] : no nosebleeds [Hoarseness] : no hoarseness [Nasal Discharge] : no nasal discharge [Sore Throat] : no sore throat [Postnasal Drip] : no postnasal drip [Chest Pain] : no chest pain [Leg Claudication] : no leg claudication [Lower Ext Edema] : no lower extremity edema [Orthopnea] : no orthopnea [Paroysmal Nocturnal Dyspnea] : no paroysmal nocturnal dyspnea [Shortness Of Breath] : no shortness of breath [Wheezing] : no wheezing [Cough] : no cough [Dyspnea on Exertion] : no dyspnea on exertion [Abdominal Pain] : no abdominal pain [Nausea] : no nausea [Constipation] : no constipation [Diarrhea] : diarrhea [Vomiting] : no vomiting [Heartburn] : no heartburn [Melena] : no melena [Dysuria] : no dysuria [Incontinence] : no incontinence [Hematuria] : no hematuria [Frequency] : no frequency [Vaginal Discharge] : no vaginal discharge [Dysmenorrhea] : no dysmenorrhea [Joint Pain] : no joint pain [Muscle Pain] : no muscle pain

## 2018-08-15 NOTE — ED ADULT NURSE NOTE - CHPI ED NUR SYMPTOMS NEG
no nausea/no back pain/no fever/no diaphoresis/no dizziness/no chills/no vomiting/no congestion/no chest pain/no syncope

## 2018-08-15 NOTE — ED ADULT NURSE NOTE - OBJECTIVE STATEMENT
Pt c/o palpitations starting today while at her doctors office. Pt states that her heart rate got to 114 bpm at the doctors office. Pt also states that she bent over last night and felt palpitations as well. Pt denies pain anywhere. Pt states that she has had SOB lately because of a procedure that she had recently.

## 2018-08-15 NOTE — ED PROVIDER NOTE - MEDICAL DECISION MAKING DETAILS
pt with known mitral stenosis afib presents with ferguson case discussed with Dr. Marshall rec digoxin .25 today and starting tomorrow .125 qod

## 2018-08-15 NOTE — ED PROVIDER NOTE - RADIATION
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
no radiation

## 2018-08-16 ENCOUNTER — APPOINTMENT (OUTPATIENT)
Dept: CARDIOLOGY | Facility: CLINIC | Age: 83
End: 2018-08-16

## 2018-08-20 ENCOUNTER — NON-APPOINTMENT (OUTPATIENT)
Age: 83
End: 2018-08-20

## 2018-08-20 ENCOUNTER — APPOINTMENT (OUTPATIENT)
Dept: CARDIOLOGY | Facility: CLINIC | Age: 83
End: 2018-08-20
Payer: MEDICARE

## 2018-08-20 VITALS
HEIGHT: 68 IN | HEART RATE: 85 BPM | SYSTOLIC BLOOD PRESSURE: 139 MMHG | DIASTOLIC BLOOD PRESSURE: 83 MMHG | OXYGEN SATURATION: 96 % | BODY MASS INDEX: 24.4 KG/M2 | WEIGHT: 161 LBS | RESPIRATION RATE: 17 BRPM

## 2018-08-20 VITALS — DIASTOLIC BLOOD PRESSURE: 62 MMHG | SYSTOLIC BLOOD PRESSURE: 108 MMHG

## 2018-08-20 PROCEDURE — 99215 OFFICE O/P EST HI 40 MIN: CPT

## 2018-08-20 PROCEDURE — 93000 ELECTROCARDIOGRAM COMPLETE: CPT

## 2018-08-21 ENCOUNTER — APPOINTMENT (OUTPATIENT)
Dept: INTERNAL MEDICINE | Facility: CLINIC | Age: 83
End: 2018-08-21
Payer: MEDICARE

## 2018-08-21 VITALS
WEIGHT: 161 LBS | HEIGHT: 68 IN | HEART RATE: 88 BPM | BODY MASS INDEX: 24.4 KG/M2 | RESPIRATION RATE: 16 BRPM | DIASTOLIC BLOOD PRESSURE: 70 MMHG | SYSTOLIC BLOOD PRESSURE: 112 MMHG

## 2018-08-21 DIAGNOSIS — R00.0 TACHYCARDIA, UNSPECIFIED: ICD-10-CM

## 2018-08-21 PROCEDURE — 99496 TRANSJ CARE MGMT HIGH F2F 7D: CPT

## 2018-08-21 NOTE — REVIEW OF SYSTEMS
[Joint Stiffness] : joint stiffness [Fever] : no fever [Chills] : no chills [Fatigue] : no fatigue [Hot Flashes] : no hot flashes [Night Sweats] : no night sweats [Recent Change In Weight] : ~T no recent weight change [Discharge] : no discharge [Pain] : no pain [Redness] : no redness [Dryness] : no dryness  [Vision Problems] : no vision problems [Itching] : no itching [Earache] : no earache [Hearing Loss] : no hearing loss [Nosebleed] : no nosebleeds [Hoarseness] : no hoarseness [Nasal Discharge] : no nasal discharge [Sore Throat] : no sore throat [Postnasal Drip] : no postnasal drip [Chest Pain] : no chest pain [Palpitations] : no palpitations [Leg Claudication] : no leg claudication [Lower Ext Edema] : no lower extremity edema [Orthopnea] : no orthopnea [Paroysmal Nocturnal Dyspnea] : no paroysmal nocturnal dyspnea [Shortness Of Breath] : no shortness of breath [Wheezing] : no wheezing [Cough] : no cough [Dyspnea on Exertion] : dyspnea on exertion [Abdominal Pain] : no abdominal pain [Nausea] : no nausea [Constipation] : no constipation [Diarrhea] : diarrhea [Vomiting] : no vomiting [Heartburn] : no heartburn [Melena] : no melena [Dysuria] : no dysuria [Incontinence] : no incontinence [Nocturia] : nocturia [Poor Libido] : libido not poor [Hematuria] : no hematuria [Frequency] : no frequency [Vaginal Discharge] : no vaginal discharge [Dysmenorrhea] : no dysmenorrhea [Joint Pain] : no joint pain [Joint Swelling] : no joint swelling [Muscle Weakness] : no muscle weakness [Muscle Pain] : no muscle pain [Back Pain] : back pain [Itching] : no itching [Mole Changes] : no mole changes [Nail Changes] : no nail changes [Hair Changes] : no hair changes [Skin Rash] : no skin rash [Headache] : no headache [Dizziness] : no dizziness [Fainting] : no fainting [Confusion] : no confusion [Memory Loss] : no memory loss [Unsteady Walking] : ataxia [Suicidal] : not suicidal [Insomnia] : no insomnia [Anxiety] : no anxiety [Depression] : no depression [Easy Bleeding] : no easy bleeding [Easy Bruising] : no easy bruising [Swollen Glands] : no swollen glands

## 2018-08-21 NOTE — HISTORY OF PRESENT ILLNESS
[Post-hospitalization from ___ Hospital] : Post-hospitalization from [unfilled] Hospital [Discharged on ___] : discharged on [unfilled] [FreeTextEntry2] : -89 year-old woman comes to the office for transitional care after a visit to the emergency room for rapid atrial fibrillation. She was started on digoxin 0.125 mg every other day and is currently hooked up to a Holter monitor she has not experienced any rapid heart rates since he has left the emergency room she denies chest pain shortness of breath at rest she does get short of breath on exertion no lightheadedness no dizziness no vertigo no syncope he denies abdominal pain nausea vomiting diarrhea constipation rectal bleeding or melena has had no temperature chills sweats or myalgias her weight has been stable and her appetite has been good

## 2018-08-21 NOTE — ASSESSMENT
[FreeTextEntry1] : Physical exam shows a well-developed female in no acute distress blood pressure was 112/70 height 5 foot 8 inches 161 pounds heart rate of  irregular abdomen soft and nontender HEENT was unremarkable chest was clear cardiovascular was irregular irregular at a moderate ventricular response and was a 2/6 systolic murmur abdomen was soft extremities showed left greater than right lower extremity edema chronic neurologic exam was nonfocal.......... patient being on at present with a Holter monitor to  how her atrial fibrillation is doing digoxin 0.125 mg every other day has been initiated if tachycardia still occur Dr. Leon he mentions increasing her beta blocker or a Cardizem consideration of a MT followed by a cardioversion is being discussed

## 2018-08-23 ENCOUNTER — NON-APPOINTMENT (OUTPATIENT)
Age: 83
End: 2018-08-23

## 2018-08-23 PROCEDURE — 93224 XTRNL ECG REC UP TO 48 HRS: CPT

## 2018-09-04 ENCOUNTER — APPOINTMENT (OUTPATIENT)
Dept: ORTHOPEDIC SURGERY | Facility: CLINIC | Age: 83
End: 2018-09-04
Payer: MEDICARE

## 2018-09-04 VITALS — WEIGHT: 161 LBS | BODY MASS INDEX: 24.4 KG/M2 | HEIGHT: 68 IN

## 2018-09-04 DIAGNOSIS — M19.019 PRIMARY OSTEOARTHRITIS, UNSPECIFIED SHOULDER: ICD-10-CM

## 2018-09-04 DIAGNOSIS — M19.011 PRIMARY OSTEOARTHRITIS, RIGHT SHOULDER: ICD-10-CM

## 2018-09-04 PROCEDURE — 20610 DRAIN/INJ JOINT/BURSA W/O US: CPT | Mod: RT

## 2018-09-04 PROCEDURE — 99214 OFFICE O/P EST MOD 30 MIN: CPT | Mod: 25

## 2018-09-05 ENCOUNTER — APPOINTMENT (OUTPATIENT)
Dept: SURGERY | Facility: CLINIC | Age: 83
End: 2018-09-05
Payer: MEDICARE

## 2018-09-05 PROCEDURE — 99213 OFFICE O/P EST LOW 20 MIN: CPT

## 2018-09-06 ENCOUNTER — APPOINTMENT (OUTPATIENT)
Dept: CARDIOLOGY | Facility: CLINIC | Age: 83
End: 2018-09-06
Payer: MEDICARE

## 2018-09-06 ENCOUNTER — NON-APPOINTMENT (OUTPATIENT)
Age: 83
End: 2018-09-06

## 2018-09-06 VITALS
DIASTOLIC BLOOD PRESSURE: 74 MMHG | HEIGHT: 68 IN | WEIGHT: 163 LBS | HEART RATE: 72 BPM | RESPIRATION RATE: 16 BRPM | OXYGEN SATURATION: 97 % | BODY MASS INDEX: 24.71 KG/M2 | SYSTOLIC BLOOD PRESSURE: 110 MMHG

## 2018-09-06 VITALS — HEART RATE: 83 BPM | OXYGEN SATURATION: 97 % | DIASTOLIC BLOOD PRESSURE: 72 MMHG | SYSTOLIC BLOOD PRESSURE: 134 MMHG

## 2018-09-06 LAB
INR PPP: 2.4 RATIO
POCT-PROTHROMBIN TIME: 28.5 SECS
QUALITY CONTROL: YES

## 2018-09-06 PROCEDURE — 99214 OFFICE O/P EST MOD 30 MIN: CPT

## 2018-09-06 PROCEDURE — 93000 ELECTROCARDIOGRAM COMPLETE: CPT

## 2018-09-06 PROCEDURE — 85610 PROTHROMBIN TIME: CPT | Mod: QW

## 2018-09-17 ENCOUNTER — NON-APPOINTMENT (OUTPATIENT)
Age: 83
End: 2018-09-17

## 2018-09-17 ENCOUNTER — APPOINTMENT (OUTPATIENT)
Dept: CARDIOLOGY | Facility: CLINIC | Age: 83
End: 2018-09-17
Payer: MEDICARE

## 2018-09-17 VITALS
HEIGHT: 68 IN | SYSTOLIC BLOOD PRESSURE: 124 MMHG | DIASTOLIC BLOOD PRESSURE: 76 MMHG | WEIGHT: 161 LBS | RESPIRATION RATE: 17 BRPM | HEART RATE: 86 BPM | OXYGEN SATURATION: 92 % | BODY MASS INDEX: 24.4 KG/M2

## 2018-09-17 LAB
INR PPP: 3.1 RATIO
POCT-PROTHROMBIN TIME: 37.1 SECS
QUALITY CONTROL: YES

## 2018-09-17 PROCEDURE — 93000 ELECTROCARDIOGRAM COMPLETE: CPT

## 2018-09-17 PROCEDURE — 99214 OFFICE O/P EST MOD 30 MIN: CPT

## 2018-09-17 PROCEDURE — 85610 PROTHROMBIN TIME: CPT | Mod: QW

## 2018-09-24 ENCOUNTER — RX RENEWAL (OUTPATIENT)
Age: 83
End: 2018-09-24

## 2018-09-26 ENCOUNTER — APPOINTMENT (OUTPATIENT)
Dept: INTERNAL MEDICINE | Facility: CLINIC | Age: 83
End: 2018-09-26
Payer: MEDICARE

## 2018-09-26 VITALS
RESPIRATION RATE: 15 BRPM | DIASTOLIC BLOOD PRESSURE: 78 MMHG | HEART RATE: 80 BPM | SYSTOLIC BLOOD PRESSURE: 128 MMHG | BODY MASS INDEX: 24.1 KG/M2 | WEIGHT: 159 LBS | HEIGHT: 68 IN

## 2018-09-26 DIAGNOSIS — R06.09 OTHER FORMS OF DYSPNEA: ICD-10-CM

## 2018-09-26 PROCEDURE — 99215 OFFICE O/P EST HI 40 MIN: CPT

## 2018-09-26 NOTE — REVIEW OF SYSTEMS
[Nocturia] : nocturia [Frequency] : frequency [Negative] : Heme/Lymph [Fever] : no fever [Chills] : no chills [Fatigue] : no fatigue [Hot Flashes] : no hot flashes [Night Sweats] : no night sweats [Recent Change In Weight] : ~T no recent weight change [Discharge] : no discharge [Pain] : no pain [Redness] : no redness [Dryness] : no dryness  [Vision Problems] : no vision problems [Itching] : no itching [Earache] : no earache [Hearing Loss] : no hearing loss [Nosebleed] : no nosebleeds [Hoarseness] : no hoarseness [Nasal Discharge] : no nasal discharge [Sore Throat] : no sore throat [Postnasal Drip] : no postnasal drip [Chest Pain] : no chest pain [Palpitations] : no palpitations [Leg Claudication] : no leg claudication [Lower Ext Edema] : no lower extremity edema [Orthopnea] : no orthopnea [Paroysmal Nocturnal Dyspnea] : no paroysmal nocturnal dyspnea [Shortness Of Breath] : no shortness of breath [Wheezing] : no wheezing [Cough] : no cough [Dyspnea on Exertion] : no dyspnea on exertion [Abdominal Pain] : no abdominal pain [Nausea] : no nausea [Constipation] : no constipation [Diarrhea] : diarrhea [Vomiting] : no vomiting [Heartburn] : no heartburn [Melena] : no melena [Dysuria] : no dysuria [Incontinence] : no incontinence [Poor Libido] : libido not poor [Hematuria] : no hematuria [Vaginal Discharge] : no vaginal discharge [Dysmenorrhea] : no dysmenorrhea [Joint Pain] : no joint pain [Joint Stiffness] : no joint stiffness [Joint Swelling] : no joint swelling [Muscle Weakness] : no muscle weakness [Muscle Pain] : no muscle pain [Back Pain] : no back pain [Itching] : no itching [Mole Changes] : no mole changes [Nail Changes] : no nail changes [Hair Changes] : no hair changes [Skin Rash] : no skin rash [Headache] : no headache [Dizziness] : no dizziness [Fainting] : no fainting [Confusion] : no confusion [Memory Loss] : no memory loss [Unsteady Walking] : no ataxia [Suicidal] : not suicidal [Insomnia] : no insomnia [Anxiety] : no anxiety [Depression] : no depression [Easy Bleeding] : no easy bleeding [Easy Bruising] : no easy bruising [Swollen Glands] : no swollen glands

## 2018-09-26 NOTE — ASSESSMENT
[FreeTextEntry1] : Physical exam shows a well-developed female in no acute distress her blood pressure was 128/78 repeated 122/70 or pulse of 80 respiratory rate of 18 her pulse was irregularly irregular HEENT was unremarkable chest was rear cardiovascular was irregularly irregular moderate ventricular response with a 2/6 systolic murmur abdomen was soft and nontender extremity left greater than right lower extremity edema which is chronic neurologic nonfocal cardiology note reviewed contemplating a MT followed by possible cardioversion patient is unsure of her influenza status and will check with her pharmacy and with Dr. Leon to see if she got the injection. She is up-to-date with her ophthalmologists and dermatologist

## 2018-09-26 NOTE — PHYSICAL EXAM
[No Acute Distress] : no acute distress [Well Nourished] : well nourished [Well Developed] : well developed [Well-Appearing] : well-appearing [Normal Voice/Communication] : normal voice/communication [Normal Sclera/Conjunctiva] : normal sclera/conjunctiva [PERRL] : pupils equal round and reactive to light [EOMI] : extraocular movements intact [Normal Outer Ear/Nose] : the outer ears and nose were normal in appearance [Normal Oropharynx] : the oropharynx was normal [Normal TMs] : both tympanic membranes were normal [Normal Nasal Mucosa] : the nasal mucosa was normal [No JVD] : no jugular venous distention [Supple] : supple [No Lymphadenopathy] : no lymphadenopathy [Thyroid Normal, No Nodules] : the thyroid was normal and there were no nodules present [No Respiratory Distress] : no respiratory distress  [Clear to Auscultation] : lungs were clear to auscultation bilaterally [No Accessory Muscle Use] : no accessory muscle use [Normal Percussion] : the chest was normal to percussion [Normal Rate] : normal rate  [Regular Rhythm] : with a regular rhythm [Normal S1, S2] : normal S1 and S2 [No Murmur] : no murmur heard [No Carotid Bruits] : no carotid bruits [No Abdominal Bruit] : a ~M bruit was not heard ~T in the abdomen [No Varicosities] : no varicosities [Pedal Pulses Present] : the pedal pulses are present [No Edema] : there was no peripheral edema [No Extremity Clubbing/Cyanosis] : no extremity clubbing/cyanosis [No Palpable Aorta] : no palpable aorta [Declined Breast Exam] : declined breast exam  [Soft] : abdomen soft [Non Tender] : non-tender [Non-distended] : non-distended [No Masses] : no abdominal mass palpated [No HSM] : no HSM [Normal Bowel Sounds] : normal bowel sounds [No Hernias] : no hernias [Declined Rectal Exam] : declined rectal exam [Normal Supraclavicular Nodes] : no supraclavicular lymphadenopathy [Normal Axillary Nodes] : no axillary lymphadenopathy [Normal Posterior Cervical Nodes] : no posterior cervical lymphadenopathy [Normal Femoral Nodes] : no femoral lymphadenopathy [Normal Anterior Cervical Nodes] : no anterior cervical lymphadenopathy [Normal Inguinal Nodes] : no inguinal lymphadenopathy [No CVA Tenderness] : no CVA  tenderness [No Spinal Tenderness] : no spinal tenderness [No Joint Swelling] : no joint swelling [Grossly Normal Strength/Tone] : grossly normal strength/tone [No Rash] : no rash [Normal Gait] : normal gait [Coordination Grossly Intact] : coordination grossly intact [No Focal Deficits] : no focal deficits [Deep Tendon Reflexes (DTR)] : deep tendon reflexes were 2+ and symmetric [Speech Grossly Normal] : speech grossly normal [Memory Grossly Normal] : memory grossly normal [Normal Affect] : the affect was normal [Alert and Oriented x3] : oriented to person, place, and time [Normal Mood] : the mood was normal [Normal Insight/Judgement] : insight and judgment were intact [Kyphosis] : no kyphosis [Scoliosis] : no scoliosis [Acne] : no acne

## 2018-09-26 NOTE — HEALTH RISK ASSESSMENT
[No falls in past year] : Patient reported no falls in the past year [0] : 2) Feeling down, depressed, or hopeless: Not at all (0) [[id number]] : 0

## 2018-09-26 NOTE — HISTORY OF PRESENT ILLNESS
[FreeTextEntry1] : Comes to the office for followup to review her medications and discuss her overall health main issues are at times insomnia and dyspnea on exertion [de-identified] : 81-year-old woman with history of A. fib MGUS hyperlipidemia carotid stenosis dyspnea on exertion GERD and hypertension comes to the office for followup he is actively followed by her cardiology she complains of no temperature chills sweats or myalgias she has had no chest pain lightheadedness palpitations she does get short of breath with any progressive activity or exertion she denies abdominal pain nausea vomiting diarrhea or constipation rectal bleeding or black stools she has occasional stiffness in her lower back knees and hips she has been seeing Dr. Chapman her cortisone injections. Her appetite has been diminished and she has lost several pounds

## 2018-10-04 ENCOUNTER — APPOINTMENT (OUTPATIENT)
Dept: CARDIOLOGY | Facility: CLINIC | Age: 83
End: 2018-10-04

## 2018-10-04 LAB
BASOPHILS # BLD AUTO: 0.06 K/UL
BASOPHILS NFR BLD AUTO: 1 %
EOSINOPHIL # BLD AUTO: 0.08 K/UL
EOSINOPHIL NFR BLD AUTO: 1.3 %
HCT VFR BLD CALC: 43.2 %
HGB BLD-MCNC: 13.6 G/DL
IMM GRANULOCYTES NFR BLD AUTO: 0.8 %
LYMPHOCYTES # BLD AUTO: 0.67 K/UL
LYMPHOCYTES NFR BLD AUTO: 10.8 %
MAN DIFF?: NORMAL
MCHC RBC-ENTMCNC: 30.3 PG
MCHC RBC-ENTMCNC: 31.5 GM/DL
MCV RBC AUTO: 96.2 FL
MONOCYTES # BLD AUTO: 0.76 K/UL
MONOCYTES NFR BLD AUTO: 12.3 %
NEUTROPHILS # BLD AUTO: 4.58 K/UL
NEUTROPHILS NFR BLD AUTO: 73.8 %
PLATELET # BLD AUTO: 233 K/UL
RBC # BLD: 4.49 M/UL
RBC # FLD: 19.5 %
WBC # FLD AUTO: 6.2 K/UL

## 2018-10-05 LAB
ANION GAP SERPL CALC-SCNC: 16 MMOL/L
BUN SERPL-MCNC: 24 MG/DL
CALCIUM SERPL-MCNC: 9.7 MG/DL
CHLORIDE SERPL-SCNC: 105 MMOL/L
CO2 SERPL-SCNC: 25 MMOL/L
CREAT SERPL-MCNC: 1.28 MG/DL
DIGOXIN SERPL-MCNC: 0.6 NG/ML
GLUCOSE SERPL-MCNC: 94 MG/DL
POTASSIUM SERPL-SCNC: 3.8 MMOL/L
SODIUM SERPL-SCNC: 146 MMOL/L

## 2018-10-08 ENCOUNTER — NON-APPOINTMENT (OUTPATIENT)
Age: 83
End: 2018-10-08

## 2018-10-08 ENCOUNTER — APPOINTMENT (OUTPATIENT)
Dept: CARDIOLOGY | Facility: CLINIC | Age: 83
End: 2018-10-08
Payer: MEDICARE

## 2018-10-08 VITALS
OXYGEN SATURATION: 97 % | SYSTOLIC BLOOD PRESSURE: 118 MMHG | DIASTOLIC BLOOD PRESSURE: 81 MMHG | HEIGHT: 68 IN | HEART RATE: 79 BPM | RESPIRATION RATE: 16 BRPM | WEIGHT: 161 LBS | BODY MASS INDEX: 24.4 KG/M2

## 2018-10-08 LAB
INR PPP: 4 RATIO
POCT-PROTHROMBIN TIME: 47.7 SECS
QUALITY CONTROL: YES

## 2018-10-08 PROCEDURE — G0008: CPT

## 2018-10-08 PROCEDURE — 85610 PROTHROMBIN TIME: CPT | Mod: QW

## 2018-10-08 PROCEDURE — 93000 ELECTROCARDIOGRAM COMPLETE: CPT

## 2018-10-08 PROCEDURE — 99215 OFFICE O/P EST HI 40 MIN: CPT

## 2018-10-08 PROCEDURE — 90662 IIV NO PRSV INCREASED AG IM: CPT

## 2018-10-11 ENCOUNTER — APPOINTMENT (OUTPATIENT)
Dept: CARDIOLOGY | Facility: CLINIC | Age: 83
End: 2018-10-11

## 2018-10-15 ENCOUNTER — APPOINTMENT (OUTPATIENT)
Dept: CARDIOLOGY | Facility: CLINIC | Age: 83
End: 2018-10-15
Payer: MEDICARE

## 2018-10-15 VITALS — HEART RATE: 85 BPM | SYSTOLIC BLOOD PRESSURE: 124 MMHG | DIASTOLIC BLOOD PRESSURE: 77 MMHG

## 2018-10-15 LAB
INR PPP: 3.2 RATIO
POCT-PROTHROMBIN TIME: 38.1 SECS
QUALITY CONTROL: YES

## 2018-10-15 PROCEDURE — 93793 ANTICOAG MGMT PT WARFARIN: CPT

## 2018-10-15 PROCEDURE — 85610 PROTHROMBIN TIME: CPT | Mod: QW

## 2018-10-22 ENCOUNTER — APPOINTMENT (OUTPATIENT)
Dept: SURGERY | Facility: CLINIC | Age: 83
End: 2018-10-22
Payer: MEDICARE

## 2018-10-22 DIAGNOSIS — Z85.820 PERSONAL HISTORY OF MALIGNANT MELANOMA OF SKIN: ICD-10-CM

## 2018-10-22 DIAGNOSIS — L03.115 CELLULITIS OF RIGHT LOWER LIMB: ICD-10-CM

## 2018-10-22 DIAGNOSIS — S81.819A LACERATION W/OUT FOREIGN BODY, UNSPECIFIED LOWER LEG, INITIAL ENCOUNTER: ICD-10-CM

## 2018-10-22 DIAGNOSIS — Z82.49 FAMILY HISTORY OF ISCHEMIC HEART DISEASE AND OTHER DISEASES OF THE CIRCULATORY SYSTEM: ICD-10-CM

## 2018-10-22 DIAGNOSIS — Z87.39 PERSONAL HISTORY OF OTHER DISEASES OF THE MUSCULOSKELETAL SYSTEM AND CONNECTIVE TISSUE: ICD-10-CM

## 2018-10-22 DIAGNOSIS — Z80.49 FAMILY HISTORY OF MALIGNANT NEOPLASM OF OTHER GENITAL ORGANS: ICD-10-CM

## 2018-10-22 PROCEDURE — 99213 OFFICE O/P EST LOW 20 MIN: CPT

## 2018-10-25 ENCOUNTER — CHART COPY (OUTPATIENT)
Age: 83
End: 2018-10-25

## 2018-10-29 ENCOUNTER — OUTPATIENT (OUTPATIENT)
Dept: OUTPATIENT SERVICES | Facility: HOSPITAL | Age: 83
LOS: 1 days | End: 2018-10-29
Payer: MEDICARE

## 2018-10-29 DIAGNOSIS — Z86.69 PERSONAL HISTORY OF OTHER DISEASES OF THE NERVOUS SYSTEM AND SENSE ORGANS: Chronic | ICD-10-CM

## 2018-10-29 DIAGNOSIS — Z95.4 PRESENCE OF OTHER HEART-VALVE REPLACEMENT: Chronic | ICD-10-CM

## 2018-10-29 DIAGNOSIS — Z98.890 OTHER SPECIFIED POSTPROCEDURAL STATES: Chronic | ICD-10-CM

## 2018-10-29 DIAGNOSIS — M84.372A STRESS FRACTURE, LEFT ANKLE, INITIAL ENCOUNTER FOR FRACTURE: Chronic | ICD-10-CM

## 2018-10-29 PROCEDURE — 85027 COMPLETE CBC AUTOMATED: CPT

## 2018-10-29 PROCEDURE — 93312 ECHO TRANSESOPHAGEAL: CPT

## 2018-10-29 PROCEDURE — 93005 ELECTROCARDIOGRAM TRACING: CPT

## 2018-10-29 PROCEDURE — 85730 THROMBOPLASTIN TIME PARTIAL: CPT

## 2018-10-29 PROCEDURE — 93306 TTE W/DOPPLER COMPLETE: CPT

## 2018-10-29 PROCEDURE — 80053 COMPREHEN METABOLIC PANEL: CPT

## 2018-10-29 PROCEDURE — 85610 PROTHROMBIN TIME: CPT

## 2018-10-29 RX ORDER — METOPROLOL TARTRATE 50 MG
1 TABLET ORAL
Qty: 0 | Refills: 0 | COMMUNITY

## 2018-10-29 RX ORDER — FUROSEMIDE 40 MG
1 TABLET ORAL
Qty: 0 | Refills: 0 | COMMUNITY

## 2018-10-29 RX ORDER — FERROUS SULFATE 325(65) MG
1 TABLET ORAL
Qty: 0 | Refills: 0 | COMMUNITY

## 2018-11-01 ENCOUNTER — APPOINTMENT (OUTPATIENT)
Dept: CARDIOLOGY | Facility: CLINIC | Age: 83
End: 2018-11-01
Payer: MEDICARE

## 2018-11-01 LAB
INR PPP: 2.5 RATIO
POCT-PROTHROMBIN TIME: 30.1 SECS
QUALITY CONTROL: YES

## 2018-11-01 PROCEDURE — 93793 ANTICOAG MGMT PT WARFARIN: CPT

## 2018-11-01 PROCEDURE — 85610 PROTHROMBIN TIME: CPT | Mod: QW

## 2018-11-12 LAB
BASOPHILS # BLD AUTO: 0.03 K/UL
BASOPHILS NFR BLD AUTO: 0.4 %
CALCIUM SERPL-MCNC: 9.9 MG/DL
CREAT SERPL-MCNC: 1.24 MG/DL
DEPRECATED KAPPA LC FREE/LAMBDA SER: 1.31 RATIO
DEPRECATED KAPPA LC FREE/LAMBDA SER: 1.31 RATIO
EOSINOPHIL # BLD AUTO: 0.11 K/UL
EOSINOPHIL NFR BLD AUTO: 1.4 %
FERRITIN SERPL-MCNC: 82 NG/ML
HCT VFR BLD CALC: 43.4 %
HGB BLD-MCNC: 13.5 G/DL
IGA SER QL IEP: 376 MG/DL
IGG SER QL IEP: 1260 MG/DL
IGM SER QL IEP: 139 MG/DL
IMM GRANULOCYTES NFR BLD AUTO: 0.3 %
IRON SATN MFR SERPL: 24 %
IRON SERPL-MCNC: 78 UG/DL
KAPPA LC CSF-MCNC: 3.18 MG/DL
KAPPA LC CSF-MCNC: 3.18 MG/DL
KAPPA LC SERPL-MCNC: 4.15 MG/DL
KAPPA LC SERPL-MCNC: 4.16 MG/DL
LDH SERPL-CCNC: 253 U/L
LYMPHOCYTES # BLD AUTO: 0.59 K/UL
LYMPHOCYTES NFR BLD AUTO: 7.4 %
MAN DIFF?: NORMAL
MCHC RBC-ENTMCNC: 30 PG
MCHC RBC-ENTMCNC: 31.1 GM/DL
MCV RBC AUTO: 96.4 FL
MONOCYTES # BLD AUTO: 0.72 K/UL
MONOCYTES NFR BLD AUTO: 9.1 %
NEUTROPHILS # BLD AUTO: 6.45 K/UL
NEUTROPHILS NFR BLD AUTO: 81.4 %
PLATELET # BLD AUTO: 275 K/UL
RBC # BLD: 4.5 M/UL
RBC # FLD: 18.6 %
TIBC SERPL-MCNC: 320 UG/DL
UIBC SERPL-MCNC: 242 UG/DL
WBC # FLD AUTO: 7.92 K/UL

## 2018-11-15 ENCOUNTER — APPOINTMENT (OUTPATIENT)
Dept: CARDIOLOGY | Facility: CLINIC | Age: 83
End: 2018-11-15
Payer: MEDICARE

## 2018-11-15 VITALS — HEART RATE: 81 BPM | RESPIRATION RATE: 16 BRPM

## 2018-11-15 LAB
INR PPP: 2.4 RATIO
POCT-PROTHROMBIN TIME: 29.4 SECS
QUALITY CONTROL: YES

## 2018-11-15 PROCEDURE — 85610 PROTHROMBIN TIME: CPT | Mod: QW

## 2018-11-15 PROCEDURE — 93793 ANTICOAG MGMT PT WARFARIN: CPT

## 2018-11-18 ENCOUNTER — RX RENEWAL (OUTPATIENT)
Age: 83
End: 2018-11-18

## 2018-11-19 ENCOUNTER — APPOINTMENT (OUTPATIENT)
Dept: CARDIOLOGY | Facility: CLINIC | Age: 83
End: 2018-11-19
Payer: MEDICARE

## 2018-11-19 VITALS
RESPIRATION RATE: 16 BRPM | BODY MASS INDEX: 23.64 KG/M2 | WEIGHT: 156 LBS | DIASTOLIC BLOOD PRESSURE: 68 MMHG | HEIGHT: 68 IN | SYSTOLIC BLOOD PRESSURE: 118 MMHG | OXYGEN SATURATION: 100 %

## 2018-11-19 DIAGNOSIS — G45.3 AMAUROSIS FUGAX: ICD-10-CM

## 2018-11-19 PROCEDURE — 93000 ELECTROCARDIOGRAM COMPLETE: CPT

## 2018-11-19 PROCEDURE — 99215 OFFICE O/P EST HI 40 MIN: CPT

## 2018-11-19 NOTE — PHYSICAL EXAM
[General Appearance - Well Developed] : well developed [Normal Appearance] : normal appearance [Well Groomed] : well groomed [General Appearance - Well Nourished] : well nourished [Normal Conjunctiva] : the conjunctiva exhibited no abnormalities [Eyelids - No Xanthelasma] : the eyelids demonstrated no xanthelasmas [Normal Oral Mucosa] : normal oral mucosa [No Oral Pallor] : no oral pallor [No Oral Cyanosis] : no oral cyanosis [Respiration, Rhythm And Depth] : normal respiratory rhythm and effort [Exaggerated Use Of Accessory Muscles For Inspiration] : no accessory muscle use [Auscultation Breath Sounds / Voice Sounds] : lungs were clear to auscultation bilaterally [Arterial Pulses Normal] : the arterial pulses were normal [Abdomen Soft] : soft [Abdomen Tenderness] : non-tender [Abdomen Mass (___ Cm)] : no abdominal mass palpated [Abnormal Walk] : normal gait [Nail Clubbing] : no clubbing of the fingernails [Cyanosis, Localized] : no localized cyanosis [Petechial Hemorrhages (___cm)] : no petechial hemorrhages [] : no ischemic changes [Skin Color & Pigmentation] : normal skin color and pigmentation [Skin Turgor] : normal skin turgor [Affect] : the affect was normal [Mood] : the mood was normal [FreeTextEntry1] : Non pitting edema.

## 2018-11-19 NOTE — ASSESSMENT
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 fib hypertension PVCs, severe pulmonary hypertension. Left atrial thrombus. Patient on oral anticoagulant.\par \par Cardioversion not  performed related to atrial thrombus.

## 2018-11-19 NOTE — HISTORY OF PRESENT ILLNESS
[FreeTextEntry1] : 89-year-old woman seen for followup. \par \par She has known valvulopathy with postprocedural mitral stenosis and mitral regurgitation post mitral clip. Prior mechanical aVR. Pulmonary hypertension.\par \par Was scheduled for MT cardioversion, but MT findings precluded cardioversion.\par \par She thinks she walks a little further now, but gets dyspnea if overexerts and when dressing. No orthopnea.\par \par Some epistaxis.\par \par \par \par \par \par

## 2018-11-19 NOTE — DISCUSSION/SUMMARY
[FreeTextEntry1] : As noted above. Review echo findings. We'll continue medical therapy for her valvular heart disease and congestive failure atrial fibrillation. Radical followup with Dr. Oquendo. See me again in 4-6 weeks prior to trip to [place]

## 2018-11-19 NOTE — REASON FOR VISIT
[Atrial Fibrillation] : atrial fibrillation [Heart Failure] : congestive heart failure [Medication Management] : Medication management [Mitral Regurgitation] : mitral regurgitation [Prosthetic Valve] : a prosthetic valve

## 2018-11-19 NOTE — CARDIOLOGY SUMMARY
[___] : [unfilled] [None] : normal LV function [Severe] : severe pulmonary hypertension  [Enlarged] : enlarged LA size [___] : [unfilled]

## 2018-11-19 NOTE — REVIEW OF SYSTEMS
[Eyeglasses] : currently wearing eyeglasses [Loss Of Hearing] : hearing loss [Joint Pain] : joint pain [see HPI] : see HPI [Skin Lesions] : skin lesion(s): [Dizziness] : dizziness [Anxiety] : anxiety [Easy Bleeding] : a tendency for easy bleeding [Easy Bruising] : a tendency for easy bruising [Negative] : Heme/Lymph [Fever] : no fever [Recent Weight Gain (___ Lbs)] : no recent weight gain [Chills] : no chills [Feeling Fatigued] : not feeling fatigued [Recent Weight Loss (___ Lbs)] : no recent weight loss [Dyspnea on exertion] : not dyspnea during exertion [Chest Pain] : no chest pain [Lower Ext Edema] : no extremity edema [Palpitations] : no palpitations [Abdominal Pain] : no abdominal pain [Heartburn] : no heartburn [Change in Appetite] : no change in appetite

## 2018-11-20 ENCOUNTER — APPOINTMENT (OUTPATIENT)
Dept: HEMATOLOGY ONCOLOGY | Facility: CLINIC | Age: 83
End: 2018-11-20
Payer: MEDICARE

## 2018-11-20 VITALS
BODY MASS INDEX: 23.72 KG/M2 | DIASTOLIC BLOOD PRESSURE: 62 MMHG | SYSTOLIC BLOOD PRESSURE: 114 MMHG | HEART RATE: 60 BPM | WEIGHT: 156 LBS | TEMPERATURE: 97.8 F

## 2018-11-20 DIAGNOSIS — D47.2 MONOCLONAL GAMMOPATHY: ICD-10-CM

## 2018-11-20 PROCEDURE — 99214 OFFICE O/P EST MOD 30 MIN: CPT

## 2018-11-20 NOTE — OB HISTORY
[Menarche Age: ____] : age at menarche was [unfilled] [Post-Menopause, No Sxs] : post-menopausal, currently without symptoms [___] : Living: [unfilled]

## 2018-11-20 NOTE — REVIEW OF SYSTEMS
[Negative] : Allergic/Immunologic [Chest Pain] : no chest pain [FreeTextEntry9] : ambulates with walker

## 2018-11-20 NOTE — HISTORY OF PRESENT ILLNESS
[de-identified] : 2/2017-Patient  referred at the request of her primary care physician for anemia. +Anemia on/off chronically.  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  Serum immunofixation showed two-week IgG lambda bands. Urine negative for Bence Mao protein.  Patient declining bone marrow evaluation. [de-identified] : Elba works at [facility] and [place] is considering transferring H/O care there.\par No current complaints of nausea/vomiting, or abdominal pain currently. \par No fevers. No new bone pain reported. \par Sees Dr. Sarmiento (vascular) this afternoon. Unable to do cardioversion recently. No c/o CP, SOB or palpitations.\par Stopped iron supplement.

## 2018-11-20 NOTE — CONSULT LETTER
[Dear  ___] : Dear  [unfilled], [Courtesy Letter:] : I had the pleasure of seeing your patient, [unfilled], in my office today. [Please see my note below.] : Please see my note below. [Consult Closing:] : Thank you very much for allowing me to participate in the care of this patient.  If you have any questions, please do not hesitate to contact me. [Sincerely,] : Sincerely, [DrLucien  ___] : Dr. CRUZ [FreeTextEntry3] : Lexie Polanco M.D.

## 2018-11-20 NOTE — ASSESSMENT
[FreeTextEntry1] : Patient with history of biclonal gammopathy/anemia-lab work reviewed. Hematologically stable currently.\par Patient continues to take Coumadin-potential bleeding risk reviewed. No overt bleeding noted clinically at present.\par Patient was given the opportunity to ask questions. Her questions have been answered at this time. She will likely be following up hematologically at [facility] [place] going forward. However, should she wish for future followup at our new office, she states she will call. She was appreciative of care received.

## 2018-11-20 NOTE — PHYSICAL EXAM
[Normal] : affect appropriate [de-identified] : B/L lower extremity edema-LLE>RLE chronically; no calf tenderness [de-identified] : NT [de-identified] : papery thin  [de-identified] : alert and oriented x 3

## 2018-11-29 ENCOUNTER — APPOINTMENT (OUTPATIENT)
Dept: CARDIOLOGY | Facility: CLINIC | Age: 83
End: 2018-11-29
Payer: MEDICARE

## 2018-11-29 VITALS — DIASTOLIC BLOOD PRESSURE: 78 MMHG | HEART RATE: 85 BPM | SYSTOLIC BLOOD PRESSURE: 114 MMHG

## 2018-11-29 LAB
INR PPP: 4.6 RATIO
POCT-PROTHROMBIN TIME: 55.4 SECS
QUALITY CONTROL: YES

## 2018-11-29 PROCEDURE — 85610 PROTHROMBIN TIME: CPT | Mod: QW

## 2018-11-29 PROCEDURE — 93793 ANTICOAG MGMT PT WARFARIN: CPT

## 2018-12-11 ENCOUNTER — APPOINTMENT (OUTPATIENT)
Dept: INTERNAL MEDICINE | Facility: CLINIC | Age: 83
End: 2018-12-11
Payer: MEDICARE

## 2018-12-11 VITALS
BODY MASS INDEX: 23.72 KG/M2 | RESPIRATION RATE: 16 BRPM | OXYGEN SATURATION: 98 % | WEIGHT: 156 LBS | SYSTOLIC BLOOD PRESSURE: 135 MMHG | TEMPERATURE: 97.2 F | HEART RATE: 82 BPM | DIASTOLIC BLOOD PRESSURE: 71 MMHG

## 2018-12-11 DIAGNOSIS — Z87.09 PERSONAL HISTORY OF OTHER DISEASES OF THE RESPIRATORY SYSTEM: ICD-10-CM

## 2018-12-11 PROCEDURE — 99215 OFFICE O/P EST HI 40 MIN: CPT

## 2018-12-11 NOTE — HISTORY OF PRESENT ILLNESS
[FreeTextEntry1] : Comes to the office for followup to discuss her medications and review her overall health recently a febrile illness with subsequent sinus and chest congestion [de-identified] : 89-year-old woman with a history of atrial fibrillation hypertension GERD hyperlipidemia comes to the office with a 5 day history of initial temperature associated with malaise muscle aches which has resolved producing just a productive cough of thick white mucus the sinuses are congested is blowing her nose and has a subjective postnasal drip when initially calling me 5 days ago she was started on Tamiflu and Z-Samir he feels constitutionally much improved still has a cough she denies pleurisy shortness of breath exertional dyspnea lightheadedness palpitations dizziness vertigo or syncope she has not noticed any significant wheezing she denies abdominal pain nausea vomiting diarrhea constipation bright red blood per rectum or black stools her appetite has been good

## 2018-12-11 NOTE — HEALTH RISK ASSESSMENT
[No falls in past year] : Patient reported no falls in the past year [0] : 2) Feeling down, depressed, or hopeless: Not at all (0) [[id number]] : 0

## 2018-12-11 NOTE — REVIEW OF SYSTEMS
[Hoarseness] : hoarseness [Nasal Discharge] : nasal discharge [Postnasal Drip] : postnasal drip [Cough] : cough [Nocturia] : nocturia [Back Pain] : back pain [Fever] : no fever [Chills] : no chills [Fatigue] : no fatigue [Hot Flashes] : no hot flashes [Night Sweats] : no night sweats [Recent Change In Weight] : ~T no recent weight change [Discharge] : no discharge [Pain] : no pain [Redness] : no redness [Dryness] : no dryness  [Vision Problems] : no vision problems [Itching] : no itching [Earache] : no earache [Hearing Loss] : no hearing loss [Nosebleed] : no nosebleeds [Sore Throat] : no sore throat [Chest Pain] : no chest pain [Palpitations] : no palpitations [Leg Claudication] : no leg claudication [Lower Ext Edema] : no lower extremity edema [Orthopnea] : no orthopnea [Shortness Of Breath] : no shortness of breath [Wheezing] : no wheezing [Dyspnea on Exertion] : no dyspnea on exertion [Abdominal Pain] : no abdominal pain [Nausea] : no nausea [Constipation] : no constipation [Diarrhea] : diarrhea [Vomiting] : no vomiting [Heartburn] : no heartburn [Melena] : no melena [Dysuria] : no dysuria [Incontinence] : no incontinence [Poor Libido] : libido not poor [Hematuria] : no hematuria [Frequency] : no frequency [Vaginal Discharge] : no vaginal discharge [Dysmenorrhea] : no dysmenorrhea [Joint Pain] : no joint pain [Joint Stiffness] : no joint stiffness [Joint Swelling] : no joint swelling [Muscle Weakness] : no muscle weakness [Muscle Pain] : no muscle pain [Itching] : no itching [Mole Changes] : no mole changes [Nail Changes] : no nail changes [Hair Changes] : no hair changes [Skin Rash] : no skin rash [Headache] : no headache [Dizziness] : no dizziness [Fainting] : no fainting [Confusion] : no confusion [Memory Loss] : no memory loss [Unsteady Walking] : no ataxia [Suicidal] : not suicidal [Insomnia] : no insomnia [Anxiety] : no anxiety [Depression] : no depression [Easy Bleeding] : no easy bleeding [Easy Bruising] : no easy bruising [Swollen Glands] : no swollen glands [FreeTextEntry6] : clear mucous

## 2018-12-11 NOTE — ASSESSMENT
[FreeTextEntry1] : Physical exam shows a well-developed female in no acute distress blood pressure was 135/71 temperature 97.2°F orally heart rate 82 respirations of 15 HEENT was unremarkable chest was clear cardiovascular exam was regular nostrils had some watery mucus there were no chest rales dullness to percussion there was some rhonchi abdomen soft vertebral compression sounds like initially she had a viral infection possibly influenza now with some secondary bronchitis and sinus congestion she is just completing a Z-Samir today and will incorporate Dymista and mucinex DM do not believe chest x-ray warranted at this time she will call me in 24 hours to report in

## 2018-12-13 ENCOUNTER — APPOINTMENT (OUTPATIENT)
Dept: CARDIOLOGY | Facility: CLINIC | Age: 83
End: 2018-12-13

## 2018-12-14 LAB
INR PPP: 3.21 RATIO
PT BLD: 37.9 SEC

## 2018-12-24 ENCOUNTER — RX RENEWAL (OUTPATIENT)
Age: 83
End: 2018-12-24

## 2018-12-24 RX ORDER — PANTOPRAZOLE 40 MG/1
40 TABLET, DELAYED RELEASE ORAL
Qty: 90 | Refills: 1 | Status: ACTIVE | COMMUNITY
Start: 2018-06-08 | End: 1900-01-01

## 2018-12-27 ENCOUNTER — APPOINTMENT (OUTPATIENT)
Dept: CARDIOLOGY | Facility: CLINIC | Age: 83
End: 2018-12-27

## 2018-12-27 ENCOUNTER — LABORATORY RESULT (OUTPATIENT)
Age: 83
End: 2018-12-27

## 2019-01-07 ENCOUNTER — APPOINTMENT (OUTPATIENT)
Dept: CARDIOLOGY | Facility: CLINIC | Age: 84
End: 2019-01-07
Payer: MEDICARE

## 2019-01-07 ENCOUNTER — NON-APPOINTMENT (OUTPATIENT)
Age: 84
End: 2019-01-07

## 2019-01-07 VITALS
WEIGHT: 157 LBS | OXYGEN SATURATION: 96 % | SYSTOLIC BLOOD PRESSURE: 117 MMHG | BODY MASS INDEX: 23.79 KG/M2 | RESPIRATION RATE: 15 BRPM | HEIGHT: 68 IN | HEART RATE: 79 BPM | DIASTOLIC BLOOD PRESSURE: 78 MMHG

## 2019-01-07 PROCEDURE — 93000 ELECTROCARDIOGRAM COMPLETE: CPT

## 2019-01-07 PROCEDURE — 85610 PROTHROMBIN TIME: CPT | Mod: QW

## 2019-01-07 PROCEDURE — 99214 OFFICE O/P EST MOD 30 MIN: CPT

## 2019-01-07 NOTE — HISTORY OF PRESENT ILLNESS
[FreeTextEntry1] : 89-year-old woman seen for followup. \par \par She has known valvulopathy with postprocedural mitral stenosis and mitral regurgitation post mitral clip. Prior mechanical aVR. Pulmonary hypertension.\par \par She had respiratory infection recently treated by Dr. Russo  including antibiotics. She is feeling better but feels quite fatigued. Breathing is comfortable. His were chronic edema. No chest pain lightheadedness dizziness or palpitations\par \par \par \par \par \par \par \par \par \par \par

## 2019-01-07 NOTE — ASSESSMENT
[FreeTextEntry1] : 89-year-old woman with prior mechanical aVR, mitral valve disease status post mitral clip with residual significant MS and MR. Severe pulmonary hypertension with right heart failure. Chronic A. fib. Patient on anticoagulation. Status post respiratory infection.

## 2019-01-07 NOTE — PHYSICAL EXAM
[General Appearance - Well Developed] : well developed [Normal Appearance] : normal appearance [Well Groomed] : well groomed [General Appearance - Well Nourished] : well nourished [Normal Conjunctiva] : the conjunctiva exhibited no abnormalities [Eyelids - No Xanthelasma] : the eyelids demonstrated no xanthelasmas [Normal Oral Mucosa] : normal oral mucosa [No Oral Pallor] : no oral pallor [No Oral Cyanosis] : no oral cyanosis [Respiration, Rhythm And Depth] : normal respiratory rhythm and effort [Exaggerated Use Of Accessory Muscles For Inspiration] : no accessory muscle use [Auscultation Breath Sounds / Voice Sounds] : lungs were clear to auscultation bilaterally [Arterial Pulses Normal] : the arterial pulses were normal [Abdomen Soft] : soft [Abdomen Tenderness] : non-tender [Abdomen Mass (___ Cm)] : no abdominal mass palpated [Abnormal Walk] : normal gait [Nail Clubbing] : no clubbing of the fingernails [Cyanosis, Localized] : no localized cyanosis [Petechial Hemorrhages (___cm)] : no petechial hemorrhages [] : no ischemic changes [FreeTextEntry1] : Non pitting edema.  [Skin Color & Pigmentation] : normal skin color and pigmentation [Skin Turgor] : normal skin turgor [Affect] : the affect was normal [Mood] : the mood was normal

## 2019-01-07 NOTE — DISCUSSION/SUMMARY
[FreeTextEntry1] : INR today therapeutic. Questions addressed regarding her Coumadin. Use of diuretics discussed as well support stockings. Limited activities as tolerated. She'll be heading to [place] and may see her physician there. Repeat INR 2 weeks. See me again in 6-8 weeks after return from [place].

## 2019-01-07 NOTE — REVIEW OF SYSTEMS
[Fever] : no fever [Recent Weight Gain (___ Lbs)] : no recent weight gain [Chills] : no chills [Feeling Fatigued] : not feeling fatigued [Recent Weight Loss (___ Lbs)] : recent [unfilled] ~Ulb weight loss [Eyeglasses] : currently wearing eyeglasses [Loss Of Hearing] : hearing loss [Dyspnea on exertion] : not dyspnea during exertion [Chest Pain] : no chest pain [Lower Ext Edema] : no extremity edema [Palpitations] : no palpitations [Abdominal Pain] : no abdominal pain [Heartburn] : no heartburn [Change in Appetite] : no change in appetite [Joint Pain] : joint pain [see HPI] : see HPI [Skin Lesions] : skin lesion(s): [Dizziness] : dizziness [Anxiety] : anxiety [Easy Bleeding] : a tendency for easy bleeding [Easy Bruising] : a tendency for easy bruising [Negative] : Heme/Lymph

## 2019-01-08 LAB
INR PPP: 3.3 RATIO
POCT-PROTHROMBIN TIME: 40.1 SECS
QUALITY CONTROL: YES

## 2019-01-16 ENCOUNTER — RX RENEWAL (OUTPATIENT)
Age: 84
End: 2019-01-16

## 2019-01-17 ENCOUNTER — APPOINTMENT (OUTPATIENT)
Dept: CARDIOLOGY | Facility: CLINIC | Age: 84
End: 2019-01-17
Payer: MEDICARE

## 2019-01-17 DIAGNOSIS — I49.9 CARDIAC ARRHYTHMIA, UNSPECIFIED: ICD-10-CM

## 2019-01-17 LAB
INR PPP: 2.4 RATIO
POCT-PROTHROMBIN TIME: 29.2 SECS
QUALITY CONTROL: YES

## 2019-01-17 PROCEDURE — 85610 PROTHROMBIN TIME: CPT | Mod: QW

## 2019-01-17 PROCEDURE — 93793 ANTICOAG MGMT PT WARFARIN: CPT

## 2019-02-19 ENCOUNTER — APPOINTMENT (OUTPATIENT)
Dept: INTERNAL MEDICINE | Facility: CLINIC | Age: 84
End: 2019-02-19
Payer: MEDICARE

## 2019-02-19 ENCOUNTER — FORM ENCOUNTER (OUTPATIENT)
Age: 84
End: 2019-02-19

## 2019-02-19 VITALS
SYSTOLIC BLOOD PRESSURE: 122 MMHG | TEMPERATURE: 97.4 F | HEART RATE: 88 BPM | WEIGHT: 152 LBS | DIASTOLIC BLOOD PRESSURE: 68 MMHG | RESPIRATION RATE: 16 BRPM | BODY MASS INDEX: 23.04 KG/M2 | HEIGHT: 68 IN | OXYGEN SATURATION: 92 %

## 2019-02-19 DIAGNOSIS — F41.9 ANXIETY DISORDER, UNSPECIFIED: ICD-10-CM

## 2019-02-19 DIAGNOSIS — Z87.898 PERSONAL HISTORY OF OTHER SPECIFIED CONDITIONS: ICD-10-CM

## 2019-02-19 DIAGNOSIS — Z00.00 ENCOUNTER FOR GENERAL ADULT MEDICAL EXAMINATION W/OUT ABNORMAL FINDINGS: ICD-10-CM

## 2019-02-19 PROCEDURE — 99215 OFFICE O/P EST HI 40 MIN: CPT

## 2019-02-19 RX ORDER — METOPROLOL SUCCINATE 100 MG/1
100 TABLET, EXTENDED RELEASE ORAL DAILY
Refills: 0 | Status: ACTIVE | COMMUNITY
Start: 2018-02-19

## 2019-02-19 RX ORDER — SUCRALFATE 1 G/1
1 TABLET ORAL
Qty: 60 | Refills: 5 | Status: DISCONTINUED | COMMUNITY
Start: 2018-06-08 | End: 2019-02-19

## 2019-02-19 RX ORDER — AZITHROMYCIN 250 MG/1
250 TABLET, FILM COATED ORAL
Qty: 6 | Refills: 1 | Status: DISCONTINUED | COMMUNITY
Start: 2018-12-07 | End: 2019-02-19

## 2019-02-19 RX ORDER — AZELASTINE HYDROCHLORIDE AND FLUTICASONE PROPIONATE 137; 50 UG/1; UG/1
137-50 SPRAY, METERED NASAL
Qty: 1 | Refills: 0 | Status: ACTIVE | COMMUNITY
Start: 2018-09-12

## 2019-02-19 RX ORDER — OSELTAMIVIR PHOSPHATE 75 MG/1
75 CAPSULE ORAL TWICE DAILY
Qty: 10 | Refills: 0 | Status: DISCONTINUED | COMMUNITY
Start: 2018-12-07 | End: 2019-02-19

## 2019-02-19 NOTE — ASSESSMENT
[FreeTextEntry1] : Physical exam shows a elderly woman in no acute distress blood pressure 122/68 height 5 foot 8 weight 152 pounds is 10 pounds down from 6 months ago pulse of 88 respirations of 15 HEENT was unremarkable chest was clear cardiovascular exam was irregularly irregular with a 2/6 systolic murmur abdomen was soft extremities showed bilateral edema neurologic exam was nonfocal concerned about her weight loss and lack of appetite complete blood test including digoxin level have been ordered discussed with her cardiologist Dr. Leon for questions the possibility of cardiac cachexia he does agree that Megace would be beneficial a CT scan of the chest abdomen and pelvis was ordered to rule out occult malignancy she is doing her best to try to eat small frequent meals and boost but so far to no avail

## 2019-02-19 NOTE — PHYSICAL EXAM
[No Acute Distress] : no acute distress [Well Nourished] : well nourished [Well Developed] : well developed [Well-Appearing] : well-appearing [Normal Voice/Communication] : normal voice/communication [Normal Sclera/Conjunctiva] : normal sclera/conjunctiva [PERRL] : pupils equal round and reactive to light [EOMI] : extraocular movements intact [Normal Outer Ear/Nose] : the outer ears and nose were normal in appearance [Normal Oropharynx] : the oropharynx was normal [Normal TMs] : both tympanic membranes were normal [Normal Nasal Mucosa] : the nasal mucosa was normal [No JVD] : no jugular venous distention [Supple] : supple [No Lymphadenopathy] : no lymphadenopathy [Thyroid Normal, No Nodules] : the thyroid was normal and there were no nodules present [No Respiratory Distress] : no respiratory distress  [No Accessory Muscle Use] : no accessory muscle use [Normal Percussion] : the chest was normal to percussion [Normal Rate] : normal rate  [Regular Rhythm] : with a regular rhythm [Normal S1, S2] : normal S1 and S2 [No Murmur] : no murmur heard [No Carotid Bruits] : no carotid bruits [No Abdominal Bruit] : a ~M bruit was not heard ~T in the abdomen [No Varicosities] : no varicosities [Pedal Pulses Present] : the pedal pulses are present [No Edema] : there was no peripheral edema [No Extremity Clubbing/Cyanosis] : no extremity clubbing/cyanosis [No Palpable Aorta] : no palpable aorta [Declined Breast Exam] : declined breast exam  [Soft] : abdomen soft [Non Tender] : non-tender [Non-distended] : non-distended [No Masses] : no abdominal mass palpated [No HSM] : no HSM [Normal Bowel Sounds] : normal bowel sounds [No Hernias] : no hernias [Declined Rectal Exam] : declined rectal exam [Normal Supraclavicular Nodes] : no supraclavicular lymphadenopathy [Normal Axillary Nodes] : no axillary lymphadenopathy [Normal Posterior Cervical Nodes] : no posterior cervical lymphadenopathy [Normal Femoral Nodes] : no femoral lymphadenopathy [Normal Anterior Cervical Nodes] : no anterior cervical lymphadenopathy [Normal Inguinal Nodes] : no inguinal lymphadenopathy [No CVA Tenderness] : no CVA  tenderness [No Spinal Tenderness] : no spinal tenderness [No Joint Swelling] : no joint swelling [Grossly Normal Strength/Tone] : grossly normal strength/tone [No Rash] : no rash [No Skin Lesions] : no skin lesions [Normal Gait] : normal gait [Coordination Grossly Intact] : coordination grossly intact [No Focal Deficits] : no focal deficits [Deep Tendon Reflexes (DTR)] : deep tendon reflexes were 2+ and symmetric [Speech Grossly Normal] : speech grossly normal [Memory Grossly Normal] : memory grossly normal [Normal Affect] : the affect was normal [Alert and Oriented x3] : oriented to person, place, and time [Normal Mood] : the mood was normal [Normal Insight/Judgement] : insight and judgment were intact [Kyphosis] : no kyphosis [Scoliosis] : no scoliosis [Acne] : no acne [de-identified] : morteza

## 2019-02-19 NOTE — HISTORY OF PRESENT ILLNESS
[FreeTextEntry1] : Comes to the office for a followup to review her medications discuss her overall health main issues have been poor appetite and weight loss [de-identified] : 89-year-old woman with a history of atrial fibrillation anxiety hypertension chronic diastolic congestive heart failure GERD hyperlipidemia monoclonal gammopathy and status post aortic valve replacement comes to the office for followup her main complaints have been fatigue poor appetite and weight loss he has lost 10 pounds in the last months she eats a good breakfast but after that has no interest in eating recently has been constipated and discontinued his sucralfate as she thought that was contributing she denies abdominal pain nausea or vomiting he said no diarrhea bright red blood per rectum or black stools she has had no temperature chills sweats or myalgias she denies headache sinus congestion sore throat cough pleurisy chest pain shortness of breath exertional dyspnea lightheadedness palpitations dizziness vertigo or syncope he denies significant musculoskeletal complaints

## 2019-02-19 NOTE — HEALTH RISK ASSESSMENT
home [No falls in past year] : Patient reported no falls in the past year [0] : 2) Feeling down, depressed, or hopeless: Not at all (0) [[id number]] : 0

## 2019-02-19 NOTE — REVIEW OF SYSTEMS
[Recent Change In Weight] : ~T recent weight change [Postnasal Drip] : postnasal drip [Cough] : cough [Constipation] : constipation [Nocturia] : nocturia [Back Pain] : back pain [Anxiety] : anxiety [Fever] : no fever [Chills] : no chills [Fatigue] : no fatigue [Hot Flashes] : no hot flashes [Night Sweats] : no night sweats [Discharge] : no discharge [Pain] : no pain [Redness] : no redness [Dryness] : no dryness  [Vision Problems] : no vision problems [Itching] : no itching [Earache] : no earache [Hearing Loss] : no hearing loss [Nosebleed] : no nosebleeds [Hoarseness] : no hoarseness [Nasal Discharge] : no nasal discharge [Sore Throat] : no sore throat [Chest Pain] : no chest pain [Palpitations] : no palpitations [Leg Claudication] : no leg claudication [Lower Ext Edema] : no lower extremity edema [Orthopnea] : no orthopnea [Shortness Of Breath] : no shortness of breath [Wheezing] : no wheezing [Dyspnea on Exertion] : no dyspnea on exertion [Abdominal Pain] : no abdominal pain [Nausea] : no nausea [Diarrhea] : diarrhea [Vomiting] : no vomiting [Heartburn] : no heartburn [Melena] : no melena [Dysuria] : no dysuria [Incontinence] : no incontinence [Poor Libido] : libido not poor [Hematuria] : no hematuria [Frequency] : no frequency [Vaginal Discharge] : no vaginal discharge [Dysmenorrhea] : no dysmenorrhea [Joint Pain] : no joint pain [Joint Stiffness] : no joint stiffness [Joint Swelling] : no joint swelling [Muscle Weakness] : no muscle weakness [Muscle Pain] : no muscle pain [Itching] : no itching [Mole Changes] : no mole changes [Nail Changes] : no nail changes [Hair Changes] : no hair changes [Skin Rash] : no skin rash [Headache] : no headache [Dizziness] : no dizziness [Fainting] : no fainting [Confusion] : no confusion [Memory Loss] : no memory loss [Unsteady Walking] : no ataxia [Suicidal] : not suicidal [Insomnia] : no insomnia [Depression] : no depression [Easy Bleeding] : no easy bleeding [Easy Bruising] : no easy bruising [Swollen Glands] : no swollen glands [FreeTextEntry2] : 10 lbs in 4 months [FreeTextEntry6] : clear mucous

## 2019-02-20 ENCOUNTER — APPOINTMENT (OUTPATIENT)
Dept: CT IMAGING | Facility: HOSPITAL | Age: 84
End: 2019-02-20
Payer: MEDICARE

## 2019-02-20 ENCOUNTER — OUTPATIENT (OUTPATIENT)
Dept: OUTPATIENT SERVICES | Facility: HOSPITAL | Age: 84
LOS: 1 days | End: 2019-02-20
Payer: MEDICARE

## 2019-02-20 DIAGNOSIS — Z98.890 OTHER SPECIFIED POSTPROCEDURAL STATES: Chronic | ICD-10-CM

## 2019-02-20 DIAGNOSIS — Z86.69 PERSONAL HISTORY OF OTHER DISEASES OF THE NERVOUS SYSTEM AND SENSE ORGANS: Chronic | ICD-10-CM

## 2019-02-20 DIAGNOSIS — Z95.4 PRESENCE OF OTHER HEART-VALVE REPLACEMENT: Chronic | ICD-10-CM

## 2019-02-20 DIAGNOSIS — M84.372A STRESS FRACTURE, LEFT ANKLE, INITIAL ENCOUNTER FOR FRACTURE: Chronic | ICD-10-CM

## 2019-02-20 DIAGNOSIS — Z00.8 ENCOUNTER FOR OTHER GENERAL EXAMINATION: ICD-10-CM

## 2019-02-20 PROCEDURE — 74177 CT ABD & PELVIS W/CONTRAST: CPT | Mod: 26

## 2019-02-20 PROCEDURE — 71260 CT THORAX DX C+: CPT | Mod: 26

## 2019-02-20 PROCEDURE — 71260 CT THORAX DX C+: CPT

## 2019-02-20 PROCEDURE — 74177 CT ABD & PELVIS W/CONTRAST: CPT

## 2019-02-21 ENCOUNTER — APPOINTMENT (OUTPATIENT)
Dept: CARDIOLOGY | Facility: CLINIC | Age: 84
End: 2019-02-21
Payer: MEDICARE

## 2019-02-21 ENCOUNTER — NON-APPOINTMENT (OUTPATIENT)
Age: 84
End: 2019-02-21

## 2019-02-21 ENCOUNTER — LABORATORY RESULT (OUTPATIENT)
Age: 84
End: 2019-02-21

## 2019-02-21 VITALS
HEIGHT: 68 IN | OXYGEN SATURATION: 95 % | HEART RATE: 81 BPM | DIASTOLIC BLOOD PRESSURE: 68 MMHG | BODY MASS INDEX: 23.04 KG/M2 | SYSTOLIC BLOOD PRESSURE: 138 MMHG | RESPIRATION RATE: 17 BRPM | WEIGHT: 152 LBS

## 2019-02-21 VITALS — OXYGEN SATURATION: 97 %

## 2019-02-21 DIAGNOSIS — I05.0 RHEUMATIC MITRAL STENOSIS: ICD-10-CM

## 2019-02-21 LAB
BASOPHILS # BLD AUTO: 0.06 K/UL
BASOPHILS NFR BLD AUTO: 0.8 %
EOSINOPHIL # BLD AUTO: 0.1 K/UL
EOSINOPHIL NFR BLD AUTO: 1.4 %
HCT VFR BLD CALC: 45.3 %
HGB BLD-MCNC: 13.7 G/DL
IMM GRANULOCYTES NFR BLD AUTO: 0.4 %
INR PPP: 3.2 RATIO
LYMPHOCYTES # BLD AUTO: 0.52 K/UL
LYMPHOCYTES NFR BLD AUTO: 7.1 %
MAN DIFF?: NORMAL
MCHC RBC-ENTMCNC: 28.8 PG
MCHC RBC-ENTMCNC: 30.2 GM/DL
MCV RBC AUTO: 95.4 FL
MONOCYTES # BLD AUTO: 0.69 K/UL
MONOCYTES NFR BLD AUTO: 9.4 %
NEUTROPHILS # BLD AUTO: 5.97 K/UL
NEUTROPHILS NFR BLD AUTO: 80.9 %
PLATELET # BLD AUTO: 252 K/UL
POCT-PROTHROMBIN TIME: 38.9 SECS
QUALITY CONTROL: YES
RBC # BLD: 4.75 M/UL
RBC # FLD: 18.8 %
WBC # FLD AUTO: 7.37 K/UL

## 2019-02-21 PROCEDURE — 93000 ELECTROCARDIOGRAM COMPLETE: CPT

## 2019-02-21 PROCEDURE — 85610 PROTHROMBIN TIME: CPT | Mod: QW

## 2019-02-21 PROCEDURE — 99215 OFFICE O/P EST HI 40 MIN: CPT | Mod: 25

## 2019-02-21 NOTE — DISCUSSION/SUMMARY
[Patient] : the patient [Risks] : risks [Benefits] : benefits [Alternatives] : alternatives [___ Month(s)] : [unfilled] month(s) [FreeTextEntry1] : Discussed with patient. Await results of CT scan and blood testing including dig level. Maintain anticoagulation. No change in cardiac regimen pending above testing.

## 2019-02-21 NOTE — HISTORY OF PRESENT ILLNESS
[FreeTextEntry1] : 89-year-old woman seen for followup. \par \par She has known valvulopathy with postprocedural mitral stenosis and mitral regurgitation post mitral clip. Prior mechanical aVR. Pulmonary hypertension.\par \par Has had loss of appetite and loss of weight. Her breathing is comfortable at rest has unchanged dyspnea with exertion chronic edema of the left leg. No chest pain lightheadedness palpitations.\par \par Sore her PMD recently and his comfort CAT scanning and blood testing, results pending to\par \par \par \par \par \par \par \par \par \par

## 2019-02-26 ENCOUNTER — APPOINTMENT (OUTPATIENT)
Dept: INTERNAL MEDICINE | Facility: CLINIC | Age: 84
End: 2019-02-26
Payer: MEDICARE

## 2019-02-26 VITALS
DIASTOLIC BLOOD PRESSURE: 74 MMHG | BODY MASS INDEX: 22.73 KG/M2 | HEART RATE: 78 BPM | HEIGHT: 68 IN | SYSTOLIC BLOOD PRESSURE: 118 MMHG | WEIGHT: 150 LBS | RESPIRATION RATE: 16 BRPM

## 2019-02-26 VITALS
HEIGHT: 68 IN | TEMPERATURE: 98 F | WEIGHT: 150 LBS | DIASTOLIC BLOOD PRESSURE: 70 MMHG | BODY MASS INDEX: 22.73 KG/M2 | OXYGEN SATURATION: 95 % | RESPIRATION RATE: 18 BRPM | HEART RATE: 80 BPM | SYSTOLIC BLOOD PRESSURE: 112 MMHG

## 2019-02-26 DIAGNOSIS — K44.9 DIAPHRAGMATIC HERNIA W/OUT OBSTRUCTION OR GANGRENE: ICD-10-CM

## 2019-02-26 LAB
25(OH)D3 SERPL-MCNC: 22.6 NG/ML
ALBUMIN SERPL ELPH-MCNC: 3.8 G/DL
ALP BLD-CCNC: 150 U/L
ALT SERPL-CCNC: 19 U/L
ANION GAP SERPL CALC-SCNC: 14 MMOL/L
APPEARANCE: CLEAR
AST SERPL-CCNC: 18 U/L
BILIRUB SERPL-MCNC: 1.3 MG/DL
BILIRUBIN URINE: NEGATIVE
BLOOD URINE: ABNORMAL
BUN SERPL-MCNC: 25 MG/DL
CALCIUM SERPL-MCNC: 9.3 MG/DL
CHLORIDE SERPL-SCNC: 104 MMOL/L
CHOLEST SERPL-MCNC: 139 MG/DL
CHOLEST/HDLC SERPL: 3.4 RATIO
CO2 SERPL-SCNC: 27 MMOL/L
COLOR: YELLOW
CREAT SERPL-MCNC: 1.13 MG/DL
CRP SERPL HS-MCNC: 7.67 MG/L
DIGOXIN SERPL-MCNC: 0.8 NG/ML
GLUCOSE BS SERPL-MCNC: 108 MG/DL
GLUCOSE QUALITATIVE U: NEGATIVE
GLUCOSE SERPL-MCNC: 97 MG/DL
HBA1C MFR BLD HPLC: 7.1 %
HDLC SERPL-MCNC: 41 MG/DL
KETONES URINE: NEGATIVE
LDLC SERPL CALC-MCNC: 77 MG/DL
LEUKOCYTE ESTERASE URINE: NEGATIVE
NITRITE URINE: NEGATIVE
PH URINE: 6.5
POTASSIUM SERPL-SCNC: 4.4 MMOL/L
PROT SERPL-MCNC: 7 G/DL
PROTEIN URINE: ABNORMAL
SODIUM SERPL-SCNC: 145 MMOL/L
SPECIFIC GRAVITY URINE: 1.03
T4 FREE SERPL-MCNC: 1.4 NG/DL
TRIGL SERPL-MCNC: 106 MG/DL
TSH SERPL-ACNC: 2.42 UIU/ML
UROBILINOGEN URINE: ABNORMAL
VIT B12 SERPL-MCNC: 727 PG/ML

## 2019-02-26 PROCEDURE — 99215 OFFICE O/P EST HI 40 MIN: CPT

## 2019-02-26 NOTE — ASSESSMENT
[FreeTextEntry1] : Sickle exam shows an elderly woman in no acute distress blood pressure was 118/74 height 5 foot 8 inches weight 150 pounds heart rate is 78 respirations 15 HEENT was unremarkable chest was clear cardiovascular was irregular regular abdomen was soft and nontender examination of the clubbing cyanosis or edema neurologic exam was nonfocalLaboratories revealed an INR of 3.2 there were 5 red cells per high-powered field hemoglobin A1c was 7.1% BUN was 25 bilirubin was 1.3 and alkaline phosphatase was 150 CT of the chest abdomen and pelvis were performed>CAT scan of the chest revealed enlarging effusions CT of the abdomen and pelvis cystic lesion in the mid body of the pancreas was unchanged from 2017 there was thickening of the sigmoid colon probably related to sigmoid diverticulosis and previous diverticulitis and there was a soft tissue mass adjacent to the uterus and the bowel of uncertain significance discussed with Dr. Gauthier,/oncologist we will proceed with a PET CT scan to evaluate and will review this CAT scan with a large crusted radiologist's decisions will be made about sampling the mass on attempt to repeat her colonoscopy based on the results of the PET scan tumor markers will be sent by Dr. Gauthier

## 2019-02-26 NOTE — HEALTH RISK ASSESSMENT
[No falls in past year] : Patient reported no falls in the past year [0] : 2) Feeling down, depressed, or hopeless: Not at all (0) [[id number]] : 0

## 2019-02-26 NOTE — HISTORY OF PRESENT ILLNESS
[FreeTextEntry1] : Comes to the office for followup to review her medications and discuss her overall health issues with weight loss fatigue and shortness of breath [de-identified] : 89-year-old woman comes to the office accompanied by her 2 sons for followup patient recently has been losing weight and noticing some shortness of breath full blood testing CAT scans of the chest abdomen and pelvis were performed patient denies temperature chills sweats or myalgias she has felt fatigued denies headache sinus congestion sore throat or wheezing pleurisy chest pain no shortness of breath at rest does have some shortness of breath with exertion denies abdominal pain nausea vomiting diarrhea constipation or blood per rectum or black stools her appetite has been poor he is decreased at present she denies any significant musculoskeletal complaints

## 2019-02-26 NOTE — REVIEW OF SYSTEMS
[Fatigue] : fatigue [Recent Change In Weight] : ~T recent weight change [Dyspnea on Exertion] : dyspnea on exertion [Constipation] : constipation [Heartburn] : heartburn [Nocturia] : nocturia [Joint Stiffness] : joint stiffness [Fever] : no fever [Chills] : no chills [Hot Flashes] : no hot flashes [Night Sweats] : no night sweats [Discharge] : no discharge [Pain] : no pain [Redness] : no redness [Dryness] : no dryness  [Vision Problems] : no vision problems [Itching] : no itching [Earache] : no earache [Hearing Loss] : no hearing loss [Nosebleed] : no nosebleeds [Hoarseness] : no hoarseness [Nasal Discharge] : no nasal discharge [Sore Throat] : no sore throat [Postnasal Drip] : no postnasal drip [Chest Pain] : no chest pain [Palpitations] : no palpitations [Leg Claudication] : no leg claudication [Lower Ext Edema] : no lower extremity edema [Orthopnea] : no orthopnea [Paroysmal Nocturnal Dyspnea] : no paroysmal nocturnal dyspnea [Shortness Of Breath] : no shortness of breath [Wheezing] : no wheezing [Cough] : no cough [Abdominal Pain] : no abdominal pain [Nausea] : no nausea [Diarrhea] : diarrhea [Vomiting] : no vomiting [Melena] : no melena [Dysuria] : no dysuria [Incontinence] : no incontinence [Poor Libido] : libido not poor [Hematuria] : no hematuria [Frequency] : no frequency [Vaginal Discharge] : no vaginal discharge [Joint Pain] : no joint pain [Joint Swelling] : no joint swelling [Muscle Weakness] : no muscle weakness [Muscle Pain] : no muscle pain [Back Pain] : no back pain [Itching] : no itching [Mole Changes] : no mole changes [Nail Changes] : no nail changes [Hair Changes] : no hair changes [Skin Rash] : no skin rash [Headache] : no headache [Dizziness] : no dizziness [Fainting] : no fainting [Confusion] : no confusion [Memory Loss] : no memory loss [Unsteady Walking] : no ataxia [Suicidal] : not suicidal [Insomnia] : no insomnia [Anxiety] : no anxiety [Depression] : no depression [Easy Bleeding] : no easy bleeding [Easy Bruising] : no easy bruising [Swollen Glands] : no swollen glands [FreeTextEntry2] : loss 5-7 lbs

## 2019-02-26 NOTE — PHYSICAL EXAM
[No Acute Distress] : no acute distress [Well Nourished] : well nourished [Well Developed] : well developed [Well-Appearing] : well-appearing [Normal Voice/Communication] : normal voice/communication [Normal Sclera/Conjunctiva] : normal sclera/conjunctiva [PERRL] : pupils equal round and reactive to light [EOMI] : extraocular movements intact [Normal Outer Ear/Nose] : the outer ears and nose were normal in appearance [Normal Oropharynx] : the oropharynx was normal [Normal TMs] : both tympanic membranes were normal [Normal Nasal Mucosa] : the nasal mucosa was normal [No JVD] : no jugular venous distention [Supple] : supple [No Lymphadenopathy] : no lymphadenopathy [Thyroid Normal, No Nodules] : the thyroid was normal and there were no nodules present [No Respiratory Distress] : no respiratory distress  [No Accessory Muscle Use] : no accessory muscle use [Normal Percussion] : the chest was normal to percussion [Normal Rate] : normal rate  [Regular Rhythm] : with a regular rhythm [Normal S1, S2] : normal S1 and S2 [No Murmur] : no murmur heard [No Carotid Bruits] : no carotid bruits [No Abdominal Bruit] : a ~M bruit was not heard ~T in the abdomen [No Varicosities] : no varicosities [Pedal Pulses Present] : the pedal pulses are present [No Edema] : there was no peripheral edema [No Extremity Clubbing/Cyanosis] : no extremity clubbing/cyanosis [No Palpable Aorta] : no palpable aorta [Declined Breast Exam] : declined breast exam  [Soft] : abdomen soft [Non Tender] : non-tender [Non-distended] : non-distended [No Masses] : no abdominal mass palpated [Normal Bowel Sounds] : normal bowel sounds [No Hernias] : no hernias [Declined Rectal Exam] : declined rectal exam [Normal Supraclavicular Nodes] : no supraclavicular lymphadenopathy [Normal Axillary Nodes] : no axillary lymphadenopathy [Normal Posterior Cervical Nodes] : no posterior cervical lymphadenopathy [Normal Femoral Nodes] : no femoral lymphadenopathy [Normal Anterior Cervical Nodes] : no anterior cervical lymphadenopathy [Normal Inguinal Nodes] : no inguinal lymphadenopathy [No CVA Tenderness] : no CVA  tenderness [No Spinal Tenderness] : no spinal tenderness [No Joint Swelling] : no joint swelling [Grossly Normal Strength/Tone] : grossly normal strength/tone [No Rash] : no rash [No Skin Lesions] : no skin lesions [Normal Gait] : normal gait [Coordination Grossly Intact] : coordination grossly intact [No Focal Deficits] : no focal deficits [Deep Tendon Reflexes (DTR)] : deep tendon reflexes were 2+ and symmetric [Speech Grossly Normal] : speech grossly normal [Memory Grossly Normal] : memory grossly normal [Normal Affect] : the affect was normal [Alert and Oriented x3] : oriented to person, place, and time [Normal Mood] : the mood was normal [Normal Insight/Judgement] : insight and judgment were intact [Kyphosis] : no kyphosis [Scoliosis] : no scoliosis [Acne] : no acne [de-identified] : rhonchi  decreased b/s 1/3 on right 1/4 on left [de-identified] : liver edge 2-3 cm below right costal margin

## 2019-03-01 ENCOUNTER — FORM ENCOUNTER (OUTPATIENT)
Age: 84
End: 2019-03-01

## 2019-03-02 ENCOUNTER — OUTPATIENT (OUTPATIENT)
Dept: OUTPATIENT SERVICES | Facility: HOSPITAL | Age: 84
LOS: 1 days | End: 2019-03-02
Payer: MEDICARE

## 2019-03-02 ENCOUNTER — APPOINTMENT (OUTPATIENT)
Dept: NUCLEAR MEDICINE | Facility: CLINIC | Age: 84
End: 2019-03-02
Payer: MEDICARE

## 2019-03-02 DIAGNOSIS — D64.9 ANEMIA, UNSPECIFIED: ICD-10-CM

## 2019-03-02 DIAGNOSIS — Z86.69 PERSONAL HISTORY OF OTHER DISEASES OF THE NERVOUS SYSTEM AND SENSE ORGANS: Chronic | ICD-10-CM

## 2019-03-02 DIAGNOSIS — M84.372A STRESS FRACTURE, LEFT ANKLE, INITIAL ENCOUNTER FOR FRACTURE: Chronic | ICD-10-CM

## 2019-03-02 DIAGNOSIS — Z95.4 PRESENCE OF OTHER HEART-VALVE REPLACEMENT: Chronic | ICD-10-CM

## 2019-03-02 DIAGNOSIS — Z98.890 OTHER SPECIFIED POSTPROCEDURAL STATES: Chronic | ICD-10-CM

## 2019-03-02 PROCEDURE — A9552: CPT

## 2019-03-02 PROCEDURE — 78815 PET IMAGE W/CT SKULL-THIGH: CPT

## 2019-03-02 PROCEDURE — 78815 PET IMAGE W/CT SKULL-THIGH: CPT | Mod: 26,PI

## 2019-03-04 ENCOUNTER — OTHER (OUTPATIENT)
Age: 84
End: 2019-03-04

## 2019-03-04 DIAGNOSIS — E04.1 NONTOXIC SINGLE THYROID NODULE: ICD-10-CM

## 2019-03-07 ENCOUNTER — FORM ENCOUNTER (OUTPATIENT)
Age: 84
End: 2019-03-07

## 2019-03-07 ENCOUNTER — APPOINTMENT (OUTPATIENT)
Dept: CARDIOLOGY | Facility: CLINIC | Age: 84
End: 2019-03-07
Payer: MEDICARE

## 2019-03-07 ENCOUNTER — NON-APPOINTMENT (OUTPATIENT)
Age: 84
End: 2019-03-07

## 2019-03-07 VITALS
HEIGHT: 68 IN | SYSTOLIC BLOOD PRESSURE: 126 MMHG | OXYGEN SATURATION: 98 % | DIASTOLIC BLOOD PRESSURE: 88 MMHG | RESPIRATION RATE: 17 BRPM | HEART RATE: 74 BPM

## 2019-03-07 VITALS — WEIGHT: 149 LBS | BODY MASS INDEX: 22.66 KG/M2

## 2019-03-07 VITALS — DIASTOLIC BLOOD PRESSURE: 88 MMHG | RESPIRATION RATE: 17 BRPM | SYSTOLIC BLOOD PRESSURE: 126 MMHG | HEIGHT: 68 IN

## 2019-03-07 DIAGNOSIS — I34.0 NONRHEUMATIC MITRAL (VALVE) INSUFFICIENCY: ICD-10-CM

## 2019-03-07 LAB
INR PPP: 2 RATIO
POCT-PROTHROMBIN TIME: 24.1 SECS
QUALITY CONTROL: YES

## 2019-03-07 PROCEDURE — 85610 PROTHROMBIN TIME: CPT | Mod: QW

## 2019-03-07 PROCEDURE — 99215 OFFICE O/P EST HI 40 MIN: CPT | Mod: 25

## 2019-03-07 PROCEDURE — 93000 ELECTROCARDIOGRAM COMPLETE: CPT

## 2019-03-07 NOTE — CARDIOLOGY SUMMARY
[___] : [unfilled] [None] : normal LV function [Severe] : severe pulmonary hypertension  [Enlarged] : enlarged LA size

## 2019-03-07 NOTE — HISTORY OF PRESENT ILLNESS
[FreeTextEntry1] : 89-year-old woman seen for followup. \par \par She has known valvulopathy with postprocedural mitral stenosis and mitral regurgitation post mitral clip. Prior mechanical aVR. Pulmonary hypertension.\par \par She has had recent PET scanning and has seen Dr. lozano gastroenterologist with whom I spoke by telephone.\par \par There is concern regarding abnormal findings in the sigmoid colon which was likely diverticulitis malignancy not excluded. She is also scheduled for a thyroid workup.\par \par Appetite has declined she says is a little better recently.\par \par Breathing is comfortable she still walks 20-30 steps before she is to stop no leg swelling orthopnea chest pain palpitations.\par \par \par \par \par \par \par \par \par \par \par

## 2019-03-07 NOTE — ASSESSMENT
[FreeTextEntry1] : Chronic congestive failure valvular heart disease atrial fibrillation history of MR MS mitral click mechanical aVR atrial fibrillation hypertension. Pleural effusion.

## 2019-03-07 NOTE — REVIEW OF SYSTEMS
[Fever] : no fever [Recent Weight Gain (___ Lbs)] : no recent weight gain [Chills] : no chills [Feeling Fatigued] : not feeling fatigued [Recent Weight Loss (___ Lbs)] : no recent weight loss [Eyeglasses] : currently wearing eyeglasses [Loss Of Hearing] : hearing loss [Dyspnea on exertion] : not dyspnea during exertion [Chest Pain] : no chest pain [Lower Ext Edema] : no extremity edema [Palpitations] : no palpitations [Abdominal Pain] : no abdominal pain [Heartburn] : no heartburn [Change in Appetite] : change in appetite [Joint Pain] : joint pain [see HPI] : see HPI [Skin Lesions] : skin lesion(s): [Dizziness] : dizziness [Anxiety] : anxiety [Easy Bleeding] : a tendency for easy bleeding [Easy Bruising] : a tendency for easy bruising [Negative] : Heme/Lymph

## 2019-03-07 NOTE — REASON FOR VISIT
[Atrial Fibrillation] : atrial fibrillation [Heart Failure] : congestive heart failure [Medication Management] : Medication management [Mitral Regurgitation] : mitral regurgitation [Prosthetic Valve] : a prosthetic valve [Other: _____] : [unfilled]

## 2019-03-07 NOTE — DISCUSSION/SUMMARY
[Patient] : the patient [Risks] : risks [Benefits] : benefits [Alternatives] : alternatives [___ Week(s)] : [unfilled] week(s) [FreeTextEntry1] : Discussed previously with Dr. lozano gastroenterologist. Discussed with patient and her son Tavo.\par \par She would be high risk for colon surgery. She does not wish to undergo colon surgery and therefore likely not undergo sigmoidoscopy or colonoscopy which also would have attendant risks.\par \par Discussed at her previous CAT scan findings may be at least in part related to progressive right heart failure with pulmonary hypertension including a pleural effusion and enlarged liver.\par \par Discussed limitations of treatment for her condition.\par \par Will increase her diuretic as noted above and reassess in 6 weeks.\par \par Conservative approach is preferred if possible.

## 2019-03-08 ENCOUNTER — APPOINTMENT (OUTPATIENT)
Dept: ULTRASOUND IMAGING | Facility: HOSPITAL | Age: 84
End: 2019-03-08
Payer: MEDICARE

## 2019-03-08 ENCOUNTER — OUTPATIENT (OUTPATIENT)
Dept: OUTPATIENT SERVICES | Facility: HOSPITAL | Age: 84
LOS: 1 days | End: 2019-03-08
Payer: MEDICARE

## 2019-03-08 DIAGNOSIS — Z95.4 PRESENCE OF OTHER HEART-VALVE REPLACEMENT: Chronic | ICD-10-CM

## 2019-03-08 DIAGNOSIS — Z86.69 PERSONAL HISTORY OF OTHER DISEASES OF THE NERVOUS SYSTEM AND SENSE ORGANS: Chronic | ICD-10-CM

## 2019-03-08 DIAGNOSIS — Z98.890 OTHER SPECIFIED POSTPROCEDURAL STATES: Chronic | ICD-10-CM

## 2019-03-08 DIAGNOSIS — Z00.8 ENCOUNTER FOR OTHER GENERAL EXAMINATION: ICD-10-CM

## 2019-03-08 DIAGNOSIS — M84.372A STRESS FRACTURE, LEFT ANKLE, INITIAL ENCOUNTER FOR FRACTURE: Chronic | ICD-10-CM

## 2019-03-08 PROCEDURE — 76856 US EXAM PELVIC COMPLETE: CPT | Mod: 26

## 2019-03-08 PROCEDURE — 76856 US EXAM PELVIC COMPLETE: CPT

## 2019-03-08 PROCEDURE — 76536 US EXAM OF HEAD AND NECK: CPT

## 2019-03-08 PROCEDURE — 76536 US EXAM OF HEAD AND NECK: CPT | Mod: 26

## 2019-03-14 ENCOUNTER — RX RENEWAL (OUTPATIENT)
Age: 84
End: 2019-03-14

## 2019-03-14 ENCOUNTER — APPOINTMENT (OUTPATIENT)
Dept: CARDIOLOGY | Facility: CLINIC | Age: 84
End: 2019-03-14
Payer: MEDICARE

## 2019-03-14 ENCOUNTER — MESSAGE (OUTPATIENT)
Age: 84
End: 2019-03-14

## 2019-03-14 RX ORDER — ROSUVASTATIN CALCIUM 20 MG/1
20 TABLET, FILM COATED ORAL
Qty: 90 | Refills: 0 | Status: ACTIVE | COMMUNITY
Start: 2017-04-13 | End: 1900-01-01

## 2019-03-15 ENCOUNTER — APPOINTMENT (OUTPATIENT)
Dept: CARDIOLOGY | Facility: CLINIC | Age: 84
End: 2019-03-15
Payer: MEDICARE

## 2019-03-15 LAB
INR PPP: 2.8 RATIO
POCT-PROTHROMBIN TIME: 33.5 SECS
QUALITY CONTROL: YES

## 2019-03-15 PROCEDURE — 85610 PROTHROMBIN TIME: CPT | Mod: QW

## 2019-03-15 PROCEDURE — 99211 OFF/OP EST MAY X REQ PHY/QHP: CPT | Mod: 25

## 2019-03-18 ENCOUNTER — APPOINTMENT (OUTPATIENT)
Dept: INTERNAL MEDICINE | Facility: CLINIC | Age: 84
End: 2019-03-18
Payer: MEDICARE

## 2019-03-18 VITALS
RESPIRATION RATE: 18 BRPM | HEART RATE: 76 BPM | BODY MASS INDEX: 22.58 KG/M2 | OXYGEN SATURATION: 97 % | HEIGHT: 68 IN | TEMPERATURE: 97.5 F | SYSTOLIC BLOOD PRESSURE: 106 MMHG | DIASTOLIC BLOOD PRESSURE: 62 MMHG | WEIGHT: 149 LBS

## 2019-03-18 DIAGNOSIS — R19.00 INTRA-ABDOMINAL AND PELVIC SWELLING, MASS AND LUMP, UNSPECIFIED SITE: ICD-10-CM

## 2019-03-18 DIAGNOSIS — E04.2 NONTOXIC MULTINODULAR GOITER: ICD-10-CM

## 2019-03-18 DIAGNOSIS — J90 PLEURAL EFFUSION, NOT ELSEWHERE CLASSIFIED: ICD-10-CM

## 2019-03-18 PROCEDURE — 99215 OFFICE O/P EST HI 40 MIN: CPT

## 2019-03-18 RX ORDER — METOPROLOL TARTRATE 50 MG/1
50 TABLET, FILM COATED ORAL
Qty: 90 | Refills: 0 | Status: DISCONTINUED | COMMUNITY
Start: 2019-01-22

## 2019-03-18 RX ORDER — MUPIROCIN 20 MG/G
2 OINTMENT TOPICAL
Qty: 22 | Refills: 0 | Status: DISCONTINUED | COMMUNITY
Start: 2018-11-03

## 2019-03-18 RX ORDER — MIRTAZAPINE 15 MG/1
15 TABLET, FILM COATED ORAL
Qty: 30 | Refills: 1 | Status: DISCONTINUED | COMMUNITY
Start: 2019-02-21 | End: 2019-03-18

## 2019-03-18 NOTE — HISTORY OF PRESENT ILLNESS
[FreeTextEntry1] : Comes to the office for followup to review her medications discuss her overall health recent events dealing with thyroid nodules and an abdominal mass [de-identified] : 89-year-old woman with a history of chronic diastolic congestive heart failure in past thyroid nodules pelvic mass hypertension  MGUS   and bilateral pleural effusions comes to the office for followup to review her medications and discuss recent tests have been done for her these include a PET scan and a CT of the abdomen and pelvis she denies temperature chills sweats or myalgias she denies headache sinus congestion postnasal drip throat cough wheezing pleurisy chest pain shortness of breath at rest she does have exertional dyspnea with minimal exertion he denies lightheadedness or palpitations dizziness vertigo or syncope she does walk with an unsteady gait denies abdominal pains nausea vomiting diarrhea constipation bright blood per rectum change in caliber of stools or rectal blood/black. Her appetite has been good her weight is stable she denies at present any significant musculoskeletal complaints

## 2019-03-18 NOTE — REVIEW OF SYSTEMS
[Fatigue] : fatigue [Dyspnea on Exertion] : dyspnea on exertion [Constipation] : constipation [Heartburn] : heartburn [Nocturia] : nocturia [Joint Stiffness] : joint stiffness [Unsteady Walking] : ataxia [Fever] : no fever [Chills] : no chills [Night Sweats] : no night sweats [Hot Flashes] : no hot flashes [Recent Change In Weight] : ~T no recent weight change [Discharge] : no discharge [Pain] : no pain [Dryness] : no dryness  [Redness] : no redness [Vision Problems] : no vision problems [Itching] : no itching [Earache] : no earache [Hearing Loss] : no hearing loss [Nosebleed] : no nosebleeds [Hoarseness] : no hoarseness [Nasal Discharge] : no nasal discharge [Postnasal Drip] : no postnasal drip [Sore Throat] : no sore throat [Palpitations] : no palpitations [Chest Pain] : no chest pain [Leg Claudication] : no leg claudication [Lower Ext Edema] : no lower extremity edema [Orthopnea] : no orthopnea [Paroysmal Nocturnal Dyspnea] : no paroysmal nocturnal dyspnea [Shortness Of Breath] : no shortness of breath [Cough] : no cough [Wheezing] : no wheezing [Nausea] : no nausea [Abdominal Pain] : no abdominal pain [Vomiting] : no vomiting [Diarrhea] : diarrhea [Dysuria] : no dysuria [Melena] : no melena [Incontinence] : no incontinence [Poor Libido] : libido not poor [Hematuria] : no hematuria [Frequency] : no frequency [Vaginal Discharge] : no vaginal discharge [Joint Pain] : no joint pain [Muscle Weakness] : no muscle weakness [Joint Swelling] : no joint swelling [Muscle Pain] : no muscle pain [Back Pain] : no back pain [Itching] : no itching [Mole Changes] : no mole changes [Nail Changes] : no nail changes [Hair Changes] : no hair changes [Headache] : no headache [Skin Rash] : no skin rash [Fainting] : no fainting [Dizziness] : no dizziness [Confusion] : no confusion [Memory Loss] : no memory loss [Insomnia] : no insomnia [Suicidal] : not suicidal [Anxiety] : no anxiety [Depression] : no depression [Easy Bleeding] : no easy bleeding [Easy Bruising] : no easy bruising [FreeTextEntry2] : loss 5-7 lbs [Swollen Glands] : no swollen glands

## 2019-03-18 NOTE — ASSESSMENT
[FreeTextEntry1] : Physical exam shows a well-developed female in no acute distress blood pressure was 106/62 height 5 foot 8 weight 149 pounds heart rate is 76 respirations of 15 HEENT was unremarkable chest was clear with decreased breath sounds one quarter bilaterally compatible with pleural effusions cardiovascular exam was irregularly irregular with a 2/6 systolic murmur abdomen was soft trace bilateral edema neurologic exam was recent PET scan done because of a abdominal mass next to the bladder and the sigmoid colon was negative for a pelvic mass however did light up for thyroid and in the sigmoid colon patient has had a colonoscopy through that area about 5 years ago although recent attempts by Dr. Kwon were unsuccessful she has had no change in caliber of her stool and has seen no blood in her stool PET scan activity there could be related to chronic diverticulitis she is undergoing an MRI of the pelvis to further evaluate this area and a dementia of her thyroid nodules

## 2019-03-18 NOTE — PHYSICAL EXAM
[No Acute Distress] : no acute distress [Well Developed] : well developed [Well Nourished] : well nourished [Well-Appearing] : well-appearing [Normal Voice/Communication] : normal voice/communication [Normal Sclera/Conjunctiva] : normal sclera/conjunctiva [EOMI] : extraocular movements intact [PERRL] : pupils equal round and reactive to light [Normal Outer Ear/Nose] : the outer ears and nose were normal in appearance [Normal Oropharynx] : the oropharynx was normal [Normal TMs] : both tympanic membranes were normal [Normal Nasal Mucosa] : the nasal mucosa was normal [No JVD] : no jugular venous distention [Supple] : supple [No Lymphadenopathy] : no lymphadenopathy [Thyroid Normal, No Nodules] : the thyroid was normal and there were no nodules present [No Respiratory Distress] : no respiratory distress  [No Accessory Muscle Use] : no accessory muscle use [Normal Percussion] : the chest was normal to percussion [Normal Rate] : normal rate  [Normal S1, S2] : normal S1 and S2 [Regular Rhythm] : with a regular rhythm [No Murmur] : no murmur heard [No Carotid Bruits] : no carotid bruits [No Varicosities] : no varicosities [No Abdominal Bruit] : a ~M bruit was not heard ~T in the abdomen [Pedal Pulses Present] : the pedal pulses are present [No Edema] : there was no peripheral edema [No Extremity Clubbing/Cyanosis] : no extremity clubbing/cyanosis [No Palpable Aorta] : no palpable aorta [Declined Breast Exam] : declined breast exam  [Soft] : abdomen soft [Non Tender] : non-tender [Non-distended] : non-distended [No Masses] : no abdominal mass palpated [Normal Bowel Sounds] : normal bowel sounds [No Hernias] : no hernias [Declined Rectal Exam] : declined rectal exam [Normal Supraclavicular Nodes] : no supraclavicular lymphadenopathy [Normal Axillary Nodes] : no axillary lymphadenopathy [Normal Posterior Cervical Nodes] : no posterior cervical lymphadenopathy [Normal Femoral Nodes] : no femoral lymphadenopathy [Normal Anterior Cervical Nodes] : no anterior cervical lymphadenopathy [Normal Inguinal Nodes] : no inguinal lymphadenopathy [No CVA Tenderness] : no CVA  tenderness [No Spinal Tenderness] : no spinal tenderness [No Joint Swelling] : no joint swelling [Grossly Normal Strength/Tone] : grossly normal strength/tone [No Rash] : no rash [No Skin Lesions] : no skin lesions [Normal Gait] : normal gait [Coordination Grossly Intact] : coordination grossly intact [No Focal Deficits] : no focal deficits [Deep Tendon Reflexes (DTR)] : deep tendon reflexes were 2+ and symmetric [Speech Grossly Normal] : speech grossly normal [Memory Grossly Normal] : memory grossly normal [Normal Affect] : the affect was normal [Alert and Oriented x3] : oriented to person, place, and time [Normal Mood] : the mood was normal [Normal Insight/Judgement] : insight and judgment were intact [Scoliosis] : no scoliosis [Kyphosis] : no kyphosis [Acne] : no acne [de-identified] :  decreased b/s 1/3 on right 1/4 on left [de-identified] : liver edge 2-3 cm below right costal margin

## 2019-03-20 ENCOUNTER — FORM ENCOUNTER (OUTPATIENT)
Age: 84
End: 2019-03-20

## 2019-03-21 ENCOUNTER — APPOINTMENT (OUTPATIENT)
Dept: MRI IMAGING | Facility: HOSPITAL | Age: 84
End: 2019-03-21
Payer: MEDICARE

## 2019-03-21 ENCOUNTER — OUTPATIENT (OUTPATIENT)
Dept: OUTPATIENT SERVICES | Facility: HOSPITAL | Age: 84
LOS: 1 days | End: 2019-03-21
Payer: MEDICARE

## 2019-03-21 DIAGNOSIS — Z86.69 PERSONAL HISTORY OF OTHER DISEASES OF THE NERVOUS SYSTEM AND SENSE ORGANS: Chronic | ICD-10-CM

## 2019-03-21 DIAGNOSIS — Z98.890 OTHER SPECIFIED POSTPROCEDURAL STATES: Chronic | ICD-10-CM

## 2019-03-21 DIAGNOSIS — Z95.4 PRESENCE OF OTHER HEART-VALVE REPLACEMENT: Chronic | ICD-10-CM

## 2019-03-21 DIAGNOSIS — R19.00 INTRA-ABDOMINAL AND PELVIC SWELLING, MASS AND LUMP, UNSPECIFIED SITE: ICD-10-CM

## 2019-03-21 DIAGNOSIS — M84.372A STRESS FRACTURE, LEFT ANKLE, INITIAL ENCOUNTER FOR FRACTURE: Chronic | ICD-10-CM

## 2019-03-21 PROCEDURE — 72197 MRI PELVIS W/O & W/DYE: CPT | Mod: 26

## 2019-03-21 PROCEDURE — 72197 MRI PELVIS W/O & W/DYE: CPT

## 2019-03-25 LAB
ANION GAP SERPL CALC-SCNC: 13 MMOL/L
BUN SERPL-MCNC: 24 MG/DL
CALCIUM SERPL-MCNC: 9.6 MG/DL
CHLORIDE SERPL-SCNC: 101 MMOL/L
CO2 SERPL-SCNC: 26 MMOL/L
CREAT SERPL-MCNC: 1.4 MG/DL
GLUCOSE SERPL-MCNC: 108 MG/DL
POTASSIUM SERPL-SCNC: 3.7 MMOL/L
SODIUM SERPL-SCNC: 140 MMOL/L

## 2019-03-28 ENCOUNTER — APPOINTMENT (OUTPATIENT)
Dept: CARDIOLOGY | Facility: CLINIC | Age: 84
End: 2019-03-28

## 2019-04-01 ENCOUNTER — APPOINTMENT (OUTPATIENT)
Dept: CARDIOLOGY | Facility: CLINIC | Age: 84
End: 2019-04-01

## 2019-04-03 ENCOUNTER — APPOINTMENT (OUTPATIENT)
Dept: INTERNAL MEDICINE | Facility: CLINIC | Age: 84
End: 2019-04-03
Payer: MEDICARE

## 2019-04-03 VITALS — HEIGHT: 68 IN | WEIGHT: 148 LBS | BODY MASS INDEX: 22.43 KG/M2

## 2019-04-03 VITALS — HEART RATE: 88 BPM | RESPIRATION RATE: 15 BRPM | DIASTOLIC BLOOD PRESSURE: 70 MMHG | SYSTOLIC BLOOD PRESSURE: 112 MMHG

## 2019-04-03 DIAGNOSIS — R11.0 NAUSEA: ICD-10-CM

## 2019-04-03 DIAGNOSIS — D49.0 NEOPLASM OF UNSPECIFIED BEHAVIOR OF DIGESTIVE SYSTEM: ICD-10-CM

## 2019-04-03 LAB
INR PPP: 2.02 RATIO
PT BLD: 23.7 SEC

## 2019-04-03 PROCEDURE — 99215 OFFICE O/P EST HI 40 MIN: CPT

## 2019-04-03 RX ORDER — ESCITALOPRAM OXALATE 5 MG/1
5 TABLET ORAL
Qty: 90 | Refills: 1 | Status: ACTIVE | COMMUNITY
Start: 2019-04-03 | End: 1900-01-01

## 2019-04-03 NOTE — PHYSICAL EXAM
[No Acute Distress] : no acute distress [Well Nourished] : well nourished [Well Developed] : well developed [Well-Appearing] : well-appearing [Normal Voice/Communication] : normal voice/communication [Normal Sclera/Conjunctiva] : normal sclera/conjunctiva [PERRL] : pupils equal round and reactive to light [EOMI] : extraocular movements intact [Normal Outer Ear/Nose] : the outer ears and nose were normal in appearance [Normal Oropharynx] : the oropharynx was normal [Normal TMs] : both tympanic membranes were normal [Normal Nasal Mucosa] : the nasal mucosa was normal [No JVD] : no jugular venous distention [Supple] : supple [No Lymphadenopathy] : no lymphadenopathy [Thyroid Normal, No Nodules] : the thyroid was normal and there were no nodules present [No Respiratory Distress] : no respiratory distress  [No Accessory Muscle Use] : no accessory muscle use [Normal Percussion] : the chest was normal to percussion [Normal Rate] : normal rate  [Regular Rhythm] : with a regular rhythm [Normal S1, S2] : normal S1 and S2 [No Murmur] : no murmur heard [No Carotid Bruits] : no carotid bruits [No Abdominal Bruit] : a ~M bruit was not heard ~T in the abdomen [No Varicosities] : no varicosities [Pedal Pulses Present] : the pedal pulses are present [No Edema] : there was no peripheral edema [No Extremity Clubbing/Cyanosis] : no extremity clubbing/cyanosis [No Palpable Aorta] : no palpable aorta [Declined Breast Exam] : declined breast exam  [Soft] : abdomen soft [Non Tender] : non-tender [Non-distended] : non-distended [No Masses] : no abdominal mass palpated [Normal Bowel Sounds] : normal bowel sounds [No Hernias] : no hernias [Declined Rectal Exam] : declined rectal exam [Normal Supraclavicular Nodes] : no supraclavicular lymphadenopathy [Normal Axillary Nodes] : no axillary lymphadenopathy [Normal Posterior Cervical Nodes] : no posterior cervical lymphadenopathy [Normal Femoral Nodes] : no femoral lymphadenopathy [Normal Anterior Cervical Nodes] : no anterior cervical lymphadenopathy [Normal Inguinal Nodes] : no inguinal lymphadenopathy [No CVA Tenderness] : no CVA  tenderness [No Spinal Tenderness] : no spinal tenderness [Kyphosis] : no kyphosis [Scoliosis] : no scoliosis [No Joint Swelling] : no joint swelling [Grossly Normal Strength/Tone] : grossly normal strength/tone [No Rash] : no rash [No Skin Lesions] : no skin lesions [Acne] : no acne [Normal Gait] : normal gait [Coordination Grossly Intact] : coordination grossly intact [No Focal Deficits] : no focal deficits [Deep Tendon Reflexes (DTR)] : deep tendon reflexes were 2+ and symmetric [Speech Grossly Normal] : speech grossly normal [Memory Grossly Normal] : memory grossly normal [Normal Affect] : the affect was normal [Alert and Oriented x3] : oriented to person, place, and time [Normal Mood] : the mood was normal [Normal Insight/Judgement] : insight and judgment were intact [de-identified] :  decreased b/s 1/3 on right 1/4 on left [de-identified] : liver edge 2-3 cm below right costal margin

## 2019-04-03 NOTE — HISTORY OF PRESENT ILLNESS
[FreeTextEntry1] : Comes to the office for followup to review medications and discuss his overall health  complaint is that of nausea [de-identified] : 82-year-old woman who comes to the office for followup the past history of mitral bowel disease MGUS IP MN chronic diastolic congestive heart failure hypertension atrial fibrillation and anemia of chronic complaint which seems to be worse recently as nausea she claims that the nausea comes on sometimes after eating been going on for months she never vomits and does not seem to occur when she is sleeping she denies temperature chills sweats or myalgias no headache sinus congestion sore throat cough wheezing pleurisy chest pain shortness of breath she has some exertional dyspnea denies lightheadedness occasions dizziness vertigo or syncope no abdominal pain diarrhea or constipation

## 2019-04-03 NOTE — REVIEW OF SYSTEMS
[Fatigue] : fatigue [Dyspnea on Exertion] : dyspnea on exertion [Nausea] : nausea [Constipation] : constipation [Heartburn] : heartburn [Nocturia] : nocturia [Joint Stiffness] : joint stiffness [Unsteady Walking] : ataxia [Fever] : no fever [Chills] : no chills [Hot Flashes] : no hot flashes [Night Sweats] : no night sweats [Recent Change In Weight] : ~T no recent weight change [Discharge] : no discharge [Pain] : no pain [Redness] : no redness [Dryness] : no dryness  [Vision Problems] : no vision problems [Itching] : no itching [Earache] : no earache [Hearing Loss] : no hearing loss [Nosebleed] : no nosebleeds [Hoarseness] : no hoarseness [Nasal Discharge] : no nasal discharge [Sore Throat] : no sore throat [Postnasal Drip] : no postnasal drip [Chest Pain] : no chest pain [Palpitations] : no palpitations [Leg Claudication] : no leg claudication [Lower Ext Edema] : no lower extremity edema [Orthopnea] : no orthopnea [Paroysmal Nocturnal Dyspnea] : no paroysmal nocturnal dyspnea [Shortness Of Breath] : no shortness of breath [Wheezing] : no wheezing [Cough] : no cough [Abdominal Pain] : no abdominal pain [Diarrhea] : diarrhea [Vomiting] : no vomiting [Melena] : no melena [Dysuria] : no dysuria [Incontinence] : no incontinence [Poor Libido] : libido not poor [Hematuria] : no hematuria [Frequency] : no frequency [Vaginal Discharge] : no vaginal discharge [Joint Pain] : no joint pain [Joint Swelling] : no joint swelling [Muscle Weakness] : no muscle weakness [Muscle Pain] : no muscle pain [Back Pain] : no back pain [Itching] : no itching [Mole Changes] : no mole changes [Nail Changes] : no nail changes [Hair Changes] : no hair changes [Skin Rash] : no skin rash [Headache] : no headache [Dizziness] : no dizziness [Fainting] : no fainting [Confusion] : no confusion [Memory Loss] : no memory loss [Suicidal] : not suicidal [Insomnia] : no insomnia [Anxiety] : no anxiety [Depression] : no depression [Easy Bleeding] : no easy bleeding [Easy Bruising] : no easy bruising [Swollen Glands] : no swollen glands [FreeTextEntry2] : loss 5-7 lbs

## 2019-04-03 NOTE — ASSESSMENT
[FreeTextEntry1] : Physical exam shows an elderly woman in no acute distress blood pressure 112/70 height 5 foot 8 inches weight 40 pounds heart rate of 88 respiratory rate of 15 HEENT was unremarkable chest was clear cardiovascular was irregularly irregular with a 2/6 murmur abdomen soft extremities show bilateral edema trace neurologic exam was nonfocal uncertain etiology of the nausea have reviewed all her medications and decided as she has no heartburn in a long time stopped the pantoprazole she did have a recent digoxin level which was 0.8 and normal electrolytes unsure of the etiology of the nausea her weight seems to be stable and a little bit started her on Lexapro attempt to improve his spirits and possibly her appetite

## 2019-04-03 NOTE — HEALTH RISK ASSESSMENT
[No falls in past year] : Patient reported no falls in the past year [0] : 2) Feeling down, depressed, or hopeless: Not at all (0) [[id number]] : 0

## 2019-04-04 ENCOUNTER — APPOINTMENT (OUTPATIENT)
Dept: CARDIOLOGY | Facility: CLINIC | Age: 84
End: 2019-04-04

## 2019-04-11 ENCOUNTER — APPOINTMENT (OUTPATIENT)
Dept: CARDIOLOGY | Facility: CLINIC | Age: 84
End: 2019-04-11

## 2019-04-12 ENCOUNTER — LABORATORY RESULT (OUTPATIENT)
Age: 84
End: 2019-04-12

## 2019-04-15 ENCOUNTER — RX RENEWAL (OUTPATIENT)
Age: 84
End: 2019-04-15

## 2019-04-15 RX ORDER — DIGOXIN 125 UG/1
125 TABLET ORAL
Qty: 20 | Refills: 3 | Status: ACTIVE | COMMUNITY
Start: 2018-08-15 | End: 1900-01-01

## 2019-04-18 ENCOUNTER — NON-APPOINTMENT (OUTPATIENT)
Age: 84
End: 2019-04-18

## 2019-04-18 ENCOUNTER — APPOINTMENT (OUTPATIENT)
Dept: CARDIOLOGY | Facility: CLINIC | Age: 84
End: 2019-04-18
Payer: MEDICARE

## 2019-04-18 VITALS
WEIGHT: 143 LBS | HEART RATE: 82 BPM | OXYGEN SATURATION: 94 % | HEIGHT: 68 IN | DIASTOLIC BLOOD PRESSURE: 66 MMHG | BODY MASS INDEX: 21.67 KG/M2 | SYSTOLIC BLOOD PRESSURE: 106 MMHG

## 2019-04-18 PROCEDURE — 99215 OFFICE O/P EST HI 40 MIN: CPT

## 2019-04-18 PROCEDURE — 93000 ELECTROCARDIOGRAM COMPLETE: CPT

## 2019-04-18 NOTE — PHYSICAL EXAM
[General Appearance - Well Developed] : well developed [Normal Appearance] : normal appearance [General Appearance - Well Nourished] : well nourished [Well Groomed] : well groomed [Eyelids - No Xanthelasma] : the eyelids demonstrated no xanthelasmas [Normal Conjunctiva] : the conjunctiva exhibited no abnormalities [No Oral Pallor] : no oral pallor [Normal Oral Mucosa] : normal oral mucosa [No Oral Cyanosis] : no oral cyanosis [Respiration, Rhythm And Depth] : normal respiratory rhythm and effort [Exaggerated Use Of Accessory Muscles For Inspiration] : no accessory muscle use [Arterial Pulses Normal] : the arterial pulses were normal [Abdomen Soft] : soft [Abdomen Mass (___ Cm)] : no abdominal mass palpated [Abdomen Tenderness] : non-tender [Nail Clubbing] : no clubbing of the fingernails [Abnormal Walk] : normal gait [] : no ischemic changes [Cyanosis, Localized] : no localized cyanosis [Petechial Hemorrhages (___cm)] : no petechial hemorrhages [Skin Color & Pigmentation] : normal skin color and pigmentation [FreeTextEntry1] : Non pitting edema.  [Affect] : the affect was normal [Skin Turgor] : normal skin turgor [Mood] : the mood was normal

## 2019-04-18 NOTE — DISCUSSION/SUMMARY
[Risks] : risks [Patient] : the patient [Alternatives] : alternatives [Benefits] : benefits [FreeTextEntry1] : Will reduce diuretic dosing and take extra 40 mg as needed p.r.n. swelling or increased dyspnea. Continue INR monitoring. Follow up PMD. Questions addressed with the patient. [___ Month(s)] : [unfilled] month(s)

## 2019-04-18 NOTE — REVIEW OF SYSTEMS
[Fever] : no fever [Recent Weight Gain (___ Lbs)] : no recent weight gain [Chills] : no chills [Recent Weight Loss (___ Lbs)] : recent [unfilled] ~Ulb weight loss [Feeling Fatigued] : feeling fatigued [Eyeglasses] : currently wearing eyeglasses [Loss Of Hearing] : hearing loss [Dyspnea on exertion] : not dyspnea during exertion [Chest Pain] : no chest pain [Lower Ext Edema] : no extremity edema [Palpitations] : no palpitations [Abdominal Pain] : no abdominal pain [Heartburn] : no heartburn [Change in Appetite] : change in appetite [Joint Pain] : joint pain [see HPI] : see HPI [Skin Lesions] : skin lesion(s): [Dizziness] : dizziness [Anxiety] : anxiety [Easy Bleeding] : a tendency for easy bleeding [Easy Bruising] : a tendency for easy bruising [Negative] : Heme/Lymph

## 2019-04-19 NOTE — H&P CARDIOLOGY - PEDAL EDEMA TYPE
RN to schedule EGD for 8 week for now to follow up on AVMs     Also remind to start on omeprazole 20mg daily x 2 months
non-pitting

## 2019-04-24 ENCOUNTER — RESULT CHARGE (OUTPATIENT)
Age: 84
End: 2019-04-24

## 2019-04-25 ENCOUNTER — NON-APPOINTMENT (OUTPATIENT)
Age: 84
End: 2019-04-25

## 2019-04-25 ENCOUNTER — APPOINTMENT (OUTPATIENT)
Dept: CARDIOLOGY | Facility: CLINIC | Age: 84
End: 2019-04-25
Payer: MEDICARE

## 2019-04-25 VITALS — BODY MASS INDEX: 21.9 KG/M2 | WEIGHT: 144 LBS | DIASTOLIC BLOOD PRESSURE: 56 MMHG | SYSTOLIC BLOOD PRESSURE: 90 MMHG

## 2019-04-25 VITALS
OXYGEN SATURATION: 99 % | RESPIRATION RATE: 16 BRPM | SYSTOLIC BLOOD PRESSURE: 94 MMHG | HEIGHT: 68 IN | HEART RATE: 81 BPM | DIASTOLIC BLOOD PRESSURE: 56 MMHG

## 2019-04-25 PROCEDURE — 93000 ELECTROCARDIOGRAM COMPLETE: CPT

## 2019-04-25 PROCEDURE — 99215 OFFICE O/P EST HI 40 MIN: CPT

## 2019-04-25 NOTE — ASSESSMENT
[FreeTextEntry1] : Improved congestive heart failure, chronic valvulopathy with pulmonary hypertension chronic A. fib. Oral anticoagulation status.

## 2019-04-25 NOTE — REVIEW OF SYSTEMS
[Fever] : no fever [Recent Weight Gain (___ Lbs)] : no recent weight gain [Chills] : no chills [Feeling Fatigued] : feeling fatigued [Recent Weight Loss (___ Lbs)] : no recent weight loss [Eyeglasses] : currently wearing eyeglasses [Loss Of Hearing] : hearing loss [Dyspnea on exertion] : not dyspnea during exertion [Chest Pain] : no chest pain [Lower Ext Edema] : no extremity edema [Palpitations] : no palpitations [Abdominal Pain] : no abdominal pain [Heartburn] : no heartburn [Change in Appetite] : change in appetite [Joint Pain] : joint pain [see HPI] : see HPI [Skin Lesions] : skin lesion(s): [Dizziness] : dizziness [Anxiety] : anxiety [Easy Bleeding] : a tendency for easy bleeding [Easy Bruising] : a tendency for easy bruising [Negative] : Heme/Lymph

## 2019-04-25 NOTE — PHYSICAL EXAM
[General Appearance - Well Developed] : well developed [Well Groomed] : well groomed [Normal Appearance] : normal appearance [General Appearance - Well Nourished] : well nourished [Eyelids - No Xanthelasma] : the eyelids demonstrated no xanthelasmas [Normal Conjunctiva] : the conjunctiva exhibited no abnormalities [Normal Oral Mucosa] : normal oral mucosa [No Oral Pallor] : no oral pallor [No Oral Cyanosis] : no oral cyanosis [Respiration, Rhythm And Depth] : normal respiratory rhythm and effort [Arterial Pulses Normal] : the arterial pulses were normal [Auscultation Breath Sounds / Voice Sounds] : lungs were clear to auscultation bilaterally [Exaggerated Use Of Accessory Muscles For Inspiration] : no accessory muscle use [Abdomen Soft] : soft [Abdomen Tenderness] : non-tender [Abnormal Walk] : normal gait [Abdomen Mass (___ Cm)] : no abdominal mass palpated [Cyanosis, Localized] : no localized cyanosis [Nail Clubbing] : no clubbing of the fingernails [Petechial Hemorrhages (___cm)] : no petechial hemorrhages [] : no ischemic changes [FreeTextEntry1] : Non pitting edema.  [Skin Color & Pigmentation] : normal skin color and pigmentation [Affect] : the affect was normal [Skin Turgor] : normal skin turgor [Mood] : the mood was normal

## 2019-04-25 NOTE — DISCUSSION/SUMMARY
[Risks] : risks [Patient] : the patient [Alternatives] : alternatives [Benefits] : benefits [___ Week(s)] : [unfilled] week(s) [FreeTextEntry1] : INR monitoring including INR testing today. Discussed with patient regarding medications. Postural precautions followup 3 weeks.

## 2019-04-25 NOTE — HISTORY OF PRESENT ILLNESS
[FreeTextEntry1] : 89-year-old woman seen for followup. \par \par She has known valvulopathy with postprocedural mitral stenosis and mitral regurgitation post mitral clip. Prior mechanical aVR. Pulmonary hypertension.\par \par Has been taking 80 mg daily of Lasix. Feels a little weak has no edema breathing is the same or perhaps slightly better does get short of breath after 18-20 steps and when getting into bed. No lightheadedness.\par \par \par \par \par \par \par \par \par \par \par \par \par \par

## 2019-05-02 ENCOUNTER — APPOINTMENT (OUTPATIENT)
Dept: CARDIOLOGY | Facility: CLINIC | Age: 84
End: 2019-05-02
Payer: MEDICARE

## 2019-05-02 VITALS — DIASTOLIC BLOOD PRESSURE: 68 MMHG | SYSTOLIC BLOOD PRESSURE: 110 MMHG | OXYGEN SATURATION: 95 % | HEART RATE: 90 BPM

## 2019-05-02 LAB
INR PPP: 3.3 RATIO
POCT-PROTHROMBIN TIME: 39.4 SECS
QUALITY CONTROL: YES

## 2019-05-02 PROCEDURE — 99211 OFF/OP EST MAY X REQ PHY/QHP: CPT | Mod: 25

## 2019-05-02 PROCEDURE — 85610 PROTHROMBIN TIME: CPT | Mod: QW

## 2019-05-09 ENCOUNTER — APPOINTMENT (OUTPATIENT)
Dept: CARDIOLOGY | Facility: CLINIC | Age: 84
End: 2019-05-09
Payer: MEDICARE

## 2019-05-09 ENCOUNTER — NON-APPOINTMENT (OUTPATIENT)
Age: 84
End: 2019-05-09

## 2019-05-09 VITALS — DIASTOLIC BLOOD PRESSURE: 53 MMHG | SYSTOLIC BLOOD PRESSURE: 92 MMHG

## 2019-05-09 VITALS
DIASTOLIC BLOOD PRESSURE: 64 MMHG | RESPIRATION RATE: 17 BRPM | HEIGHT: 68 IN | BODY MASS INDEX: 22.13 KG/M2 | HEART RATE: 103 BPM | SYSTOLIC BLOOD PRESSURE: 93 MMHG | WEIGHT: 146 LBS | OXYGEN SATURATION: 95 %

## 2019-05-09 DIAGNOSIS — I05.0 RHEUMATIC MITRAL STENOSIS: ICD-10-CM

## 2019-05-09 DIAGNOSIS — Z79.01 LONG TERM (CURRENT) USE OF ANTICOAGULANTS: ICD-10-CM

## 2019-05-09 DIAGNOSIS — Z95.2 PRESENCE OF PROSTHETIC HEART VALVE: ICD-10-CM

## 2019-05-09 PROCEDURE — 93000 ELECTROCARDIOGRAM COMPLETE: CPT

## 2019-05-09 PROCEDURE — 99215 OFFICE O/P EST HI 40 MIN: CPT

## 2019-05-09 NOTE — HISTORY OF PRESENT ILLNESS
[FreeTextEntry1] : 89-year-old woman seen for followup. \par \par She has known valvulopathy with postprocedural mitral stenosis and mitral regurgitation post mitral clip. Prior mechanical aVR. Pulmonary hypertension.\par \par Breathing has been comfortable walks up to 20 steps before has dyspnea. Has some chronic edema. Tries to watch the sodium in her diet. No chest pain lightheadedness  or palpitations.\par \par \par \par \par \par \par \par \par \par \par \par

## 2019-05-09 NOTE — ASSESSMENT
[FreeTextEntry1] : 89-year-old woman with prior mechanical aVR chronic A. fib, chronic congestive failure pulmonary hypertension. Mitral insufficiency and stenosis status post mitral clip.

## 2019-05-09 NOTE — PHYSICAL EXAM
[General Appearance - Well Developed] : well developed [Normal Appearance] : normal appearance [Well Groomed] : well groomed [General Appearance - Well Nourished] : well nourished [Normal Conjunctiva] : the conjunctiva exhibited no abnormalities [Eyelids - No Xanthelasma] : the eyelids demonstrated no xanthelasmas [Normal Oral Mucosa] : normal oral mucosa [No Oral Pallor] : no oral pallor [No Oral Cyanosis] : no oral cyanosis [Exaggerated Use Of Accessory Muscles For Inspiration] : no accessory muscle use [Respiration, Rhythm And Depth] : normal respiratory rhythm and effort [Auscultation Breath Sounds / Voice Sounds] : lungs were clear to auscultation bilaterally [Arterial Pulses Normal] : the arterial pulses were normal [Abdomen Soft] : soft [Abdomen Tenderness] : non-tender [Abnormal Walk] : normal gait [Abdomen Mass (___ Cm)] : no abdominal mass palpated [Cyanosis, Localized] : no localized cyanosis [Nail Clubbing] : no clubbing of the fingernails [Petechial Hemorrhages (___cm)] : no petechial hemorrhages [] : no ischemic changes [Skin Color & Pigmentation] : normal skin color and pigmentation [FreeTextEntry1] : Non pitting edema.  [Affect] : the affect was normal [Skin Turgor] : normal skin turgor [Mood] : the mood was normal

## 2019-05-09 NOTE — REVIEW OF SYSTEMS
[Fever] : no fever [Recent Weight Gain (___ Lbs)] : no recent weight gain [Chills] : no chills [Feeling Fatigued] : feeling fatigued [Recent Weight Loss (___ Lbs)] : no recent weight loss [Eyeglasses] : currently wearing eyeglasses [Loss Of Hearing] : hearing loss [Dyspnea on exertion] : not dyspnea during exertion [Chest Pain] : no chest pain [Lower Ext Edema] : no extremity edema [Palpitations] : no palpitations [Abdominal Pain] : no abdominal pain [Change in Appetite] : change in appetite [Heartburn] : no heartburn [Joint Pain] : joint pain [Skin Lesions] : skin lesion(s): [see HPI] : see HPI [Dizziness] : dizziness [Anxiety] : anxiety [Easy Bleeding] : a tendency for easy bleeding [Easy Bruising] : a tendency for easy bruising [Negative] : Heme/Lymph

## 2019-05-09 NOTE — DISCUSSION/SUMMARY
[Patient] : the patient [Risks] : risks [Alternatives] : alternatives [Benefits] : benefits [___ Month(s)] : [unfilled] month(s) [FreeTextEntry1] : Discussed with patient and questions were addressed including prognosis and outlook. Maintain current regimen pending upcoming blood testing home draw blood testing to be arranged INR monitoring electrolytes dig level. Followup one month low sodium diet appear

## 2019-05-13 ENCOUNTER — RX RENEWAL (OUTPATIENT)
Age: 84
End: 2019-05-13

## 2019-05-14 ENCOUNTER — RX RENEWAL (OUTPATIENT)
Age: 84
End: 2019-05-14

## 2019-05-14 LAB
ANION GAP SERPL CALC-SCNC: 12 MMOL/L
BUN SERPL-MCNC: 19 MG/DL
CALCIUM SERPL-MCNC: 9.2 MG/DL
CHLORIDE SERPL-SCNC: 98 MMOL/L
CO2 SERPL-SCNC: 26 MMOL/L
CREAT SERPL-MCNC: 1.02 MG/DL
DIGOXIN SERPL-MCNC: 0.7 NG/ML
GLUCOSE SERPL-MCNC: 111 MG/DL
POTASSIUM SERPL-SCNC: 4.6 MMOL/L
SODIUM SERPL-SCNC: 136 MMOL/L

## 2019-05-14 RX ORDER — POTASSIUM CHLORIDE 750 MG/1
10 TABLET, EXTENDED RELEASE ORAL
Qty: 60 | Refills: 4 | Status: ACTIVE | COMMUNITY
Start: 2017-06-28 | End: 1900-01-01

## 2019-05-16 ENCOUNTER — APPOINTMENT (OUTPATIENT)
Dept: INTERNAL MEDICINE | Facility: CLINIC | Age: 84
End: 2019-05-16
Payer: MEDICARE

## 2019-05-16 VITALS
BODY MASS INDEX: 21.82 KG/M2 | HEIGHT: 68 IN | WEIGHT: 144 LBS | RESPIRATION RATE: 15 BRPM | DIASTOLIC BLOOD PRESSURE: 72 MMHG | SYSTOLIC BLOOD PRESSURE: 122 MMHG | HEART RATE: 78 BPM

## 2019-05-16 DIAGNOSIS — R26.89 OTHER ABNORMALITIES OF GAIT AND MOBILITY: ICD-10-CM

## 2019-05-16 DIAGNOSIS — K21.9 GASTRO-ESOPHAGEAL REFLUX DISEASE W/OUT ESOPHAGITIS: ICD-10-CM

## 2019-05-16 DIAGNOSIS — D47.2 MONOCLONAL GAMMOPATHY: ICD-10-CM

## 2019-05-16 DIAGNOSIS — R63.4 ABNORMAL WEIGHT LOSS: ICD-10-CM

## 2019-05-16 PROCEDURE — 99215 OFFICE O/P EST HI 40 MIN: CPT

## 2019-05-16 RX ORDER — ESCITALOPRAM OXALATE 10 MG/1
10 TABLET ORAL
Qty: 30 | Refills: 5 | Status: ACTIVE | COMMUNITY
Start: 2019-05-16 | End: 1900-01-01

## 2019-05-16 NOTE — REVIEW OF SYSTEMS
[Fatigue] : fatigue [Dyspnea on Exertion] : dyspnea on exertion [Nausea] : nausea [Constipation] : constipation [Heartburn] : heartburn [Nocturia] : nocturia [Frequency] : frequency [Joint Stiffness] : joint stiffness [Unsteady Walking] : ataxia [Fever] : no fever [Chills] : no chills [Hot Flashes] : no hot flashes [Night Sweats] : no night sweats [Recent Change In Weight] : ~T no recent weight change [Discharge] : no discharge [Pain] : no pain [Dryness] : no dryness  [Redness] : no redness [Vision Problems] : no vision problems [Itching] : no itching [Earache] : no earache [Hearing Loss] : no hearing loss [Nosebleed] : no nosebleeds [Nasal Discharge] : no nasal discharge [Hoarseness] : no hoarseness [Sore Throat] : no sore throat [Postnasal Drip] : no postnasal drip [Chest Pain] : no chest pain [Leg Claudication] : no leg claudication [Palpitations] : no palpitations [Lower Ext Edema] : no lower extremity edema [Orthopnea] : no orthopnea [Paroysmal Nocturnal Dyspnea] : no paroysmal nocturnal dyspnea [Shortness Of Breath] : no shortness of breath [Cough] : no cough [Wheezing] : no wheezing [Vomiting] : no vomiting [Diarrhea] : diarrhea [Abdominal Pain] : no abdominal pain [Melena] : no melena [Dysuria] : no dysuria [Incontinence] : no incontinence [Poor Libido] : libido not poor [Hematuria] : no hematuria [Vaginal Discharge] : no vaginal discharge [Joint Pain] : no joint pain [Joint Swelling] : no joint swelling [Muscle Weakness] : no muscle weakness [Muscle Pain] : no muscle pain [Mole Changes] : no mole changes [Back Pain] : no back pain [Hair Changes] : no hair changes [Nail Changes] : no nail changes [Skin Rash] : no skin rash [Headache] : no headache [Dizziness] : no dizziness [Confusion] : no confusion [Fainting] : no fainting [Memory Loss] : no memory loss [Suicidal] : not suicidal [Anxiety] : no anxiety [Insomnia] : no insomnia [Easy Bleeding] : no easy bleeding [Easy Bruising] : no easy bruising [Depression] : no depression [Swollen Glands] : no swollen glands [FreeTextEntry2] : loss 5-7 lbs

## 2019-05-16 NOTE — ASSESSMENT
[FreeTextEntry1] : Physical exam shows an elderly woman in no acute distress walking with a walker blood pressure 122/72 weight 144 pounds heart rate 78 respirations of 15 HEENT was unremarkable chest was clear cardiovascular exam irregular with a 2/6 systolic murmur abdomen was soft extremities showed trace bilateral edema neurologic exam was nonfocal patient recent  CMP and dig level performed... she is troubled by her increased urination at night and I have given her the name of the urologist for consultation she will take Remeron at night 15 mg and escitalopram increased to 10 mg in the morning in an attempt to help her appetite she was advised to eat frequent small meals rather than attempting to use larger meals she is actively followed by her cardiologist and has seen her ophthalmologist recently

## 2019-05-16 NOTE — PHYSICAL EXAM
[No Acute Distress] : no acute distress [Well Nourished] : well nourished [Well Developed] : well developed [Well-Appearing] : well-appearing [Normal Voice/Communication] : normal voice/communication [Normal Sclera/Conjunctiva] : normal sclera/conjunctiva [EOMI] : extraocular movements intact [PERRL] : pupils equal round and reactive to light [Normal Outer Ear/Nose] : the outer ears and nose were normal in appearance [Normal Oropharynx] : the oropharynx was normal [Normal TMs] : both tympanic membranes were normal [Normal Nasal Mucosa] : the nasal mucosa was normal [Supple] : supple [No JVD] : no jugular venous distention [No Lymphadenopathy] : no lymphadenopathy [Thyroid Normal, No Nodules] : the thyroid was normal and there were no nodules present [No Respiratory Distress] : no respiratory distress  [No Accessory Muscle Use] : no accessory muscle use [Normal Percussion] : the chest was normal to percussion [Normal Rate] : normal rate  [Regular Rhythm] : with a regular rhythm [Normal S1, S2] : normal S1 and S2 [No Carotid Bruits] : no carotid bruits [No Abdominal Bruit] : a ~M bruit was not heard ~T in the abdomen [No Varicosities] : no varicosities [No Edema] : there was no peripheral edema [Pedal Pulses Present] : the pedal pulses are present [No Extremity Clubbing/Cyanosis] : no extremity clubbing/cyanosis [No Palpable Aorta] : no palpable aorta [Declined Breast Exam] : declined breast exam  [Soft] : abdomen soft [Non Tender] : non-tender [Non-distended] : non-distended [No Masses] : no abdominal mass palpated [Normal Bowel Sounds] : normal bowel sounds [No Hernias] : no hernias [Declined Rectal Exam] : declined rectal exam [Normal Supraclavicular Nodes] : no supraclavicular lymphadenopathy [Normal Axillary Nodes] : no axillary lymphadenopathy [Normal Posterior Cervical Nodes] : no posterior cervical lymphadenopathy [Normal Femoral Nodes] : no femoral lymphadenopathy [Normal Anterior Cervical Nodes] : no anterior cervical lymphadenopathy [Normal Inguinal Nodes] : no inguinal lymphadenopathy [No CVA Tenderness] : no CVA  tenderness [No Spinal Tenderness] : no spinal tenderness [No Joint Swelling] : no joint swelling [Grossly Normal Strength/Tone] : grossly normal strength/tone [No Rash] : no rash [No Skin Lesions] : no skin lesions [Normal Gait] : normal gait [Coordination Grossly Intact] : coordination grossly intact [No Focal Deficits] : no focal deficits [Deep Tendon Reflexes (DTR)] : deep tendon reflexes were 2+ and symmetric [Speech Grossly Normal] : speech grossly normal [Memory Grossly Normal] : memory grossly normal [Normal Affect] : the affect was normal [Alert and Oriented x3] : oriented to person, place, and time [Normal Insight/Judgement] : insight and judgment were intact [Normal Mood] : the mood was normal [Kyphosis] : no kyphosis [Acne] : no acne [Scoliosis] : no scoliosis [de-identified] :  decreased b/s 1/3 on right 1/4 on left [de-identified] : 2/6 systolic [de-identified] : liver edge 2-3 cm below right costal margin

## 2019-05-16 NOTE — HEALTH RISK ASSESSMENT
[No falls in past year] : Patient reported no falls in the past year [0] : 2) Feeling down, depressed, or hopeless: Not at all (0) [[id number]] : 0

## 2019-05-16 NOTE — HISTORY OF PRESENT ILLNESS
[Formal Caregiver] : formal caregiver [FreeTextEntry1] : Constantly office for followup to review her medications and discuss her overall health recent issues with increased urination especially at night and difficulty gaining weight [de-identified] : 89-year-old woman comes to the office for followup accompanied by her formal caregiver with a history of atrial fibrillation recent weight loss benign essential hypertension chronic right-sided heart failure with pulmonary hypertension elevated cholesterol GERD unsteady gait and MGUS patient's main complaints are poor balance and inability and weight she denies temperature chills sweats or myalgias she said no headache sinus congestion sore throat cough wheezing pleurisy chest pain shortness of breath exertional dyspnea lightheadedness patient's dizziness vertigo or syncope she denies abdominal pain nausea vomiting diarrhea constipation bright red blood per rectum or black stools she has been urinating frequently getting up at least 4-6 times at night he claims recently due to some efforts at weight has been stabilized around 144 pounds

## 2019-05-20 ENCOUNTER — LABORATORY RESULT (OUTPATIENT)
Age: 84
End: 2019-05-20

## 2019-05-23 ENCOUNTER — APPOINTMENT (OUTPATIENT)
Dept: CARDIOLOGY | Facility: CLINIC | Age: 84
End: 2019-05-23
Payer: MEDICARE

## 2019-05-23 VITALS
HEIGHT: 68 IN | RESPIRATION RATE: 17 BRPM | DIASTOLIC BLOOD PRESSURE: 70 MMHG | WEIGHT: 143 LBS | SYSTOLIC BLOOD PRESSURE: 110 MMHG | BODY MASS INDEX: 21.67 KG/M2 | OXYGEN SATURATION: 97 % | HEART RATE: 93 BPM

## 2019-05-23 DIAGNOSIS — Z86.79 PERSONAL HISTORY OF OTHER DISEASES OF THE CIRCULATORY SYSTEM: ICD-10-CM

## 2019-05-23 PROCEDURE — 99215 OFFICE O/P EST HI 40 MIN: CPT | Mod: 25

## 2019-05-23 PROCEDURE — 93000 ELECTROCARDIOGRAM COMPLETE: CPT

## 2019-05-23 PROCEDURE — 85610 PROTHROMBIN TIME: CPT | Mod: QW

## 2019-05-23 RX ORDER — METOLAZONE 2.5 MG/1
2.5 TABLET ORAL
Qty: 10 | Refills: 4 | Status: ACTIVE | COMMUNITY
Start: 2019-05-23 | End: 1900-01-01

## 2019-05-23 NOTE — PHYSICAL EXAM
[General Appearance - Well Developed] : well developed [Normal Appearance] : normal appearance [Well Groomed] : well groomed [General Appearance - Well Nourished] : well nourished [Normal Conjunctiva] : the conjunctiva exhibited no abnormalities [Eyelids - No Xanthelasma] : the eyelids demonstrated no xanthelasmas [Normal Oral Mucosa] : normal oral mucosa [No Oral Pallor] : no oral pallor [No Oral Cyanosis] : no oral cyanosis [Respiration, Rhythm And Depth] : normal respiratory rhythm and effort [Exaggerated Use Of Accessory Muscles For Inspiration] : no accessory muscle use [Arterial Pulses Normal] : the arterial pulses were normal [FreeTextEntry1] : Excellent quality second heart sound. Grade 3 apical systolic murmur. 3+ pretibial edema bilateral to knees [Abdomen Soft] : soft [Abdomen Tenderness] : non-tender [Abdomen Mass (___ Cm)] : no abdominal mass palpated [Abnormal Walk] : normal gait [Nail Clubbing] : no clubbing of the fingernails [Cyanosis, Localized] : no localized cyanosis [Petechial Hemorrhages (___cm)] : no petechial hemorrhages [] : no ischemic changes [Skin Color & Pigmentation] : normal skin color and pigmentation [Skin Turgor] : normal skin turgor [Affect] : the affect was normal [Mood] : the mood was normal

## 2019-05-23 NOTE — HISTORY OF PRESENT ILLNESS
[FreeTextEntry1] : 89-year-old woman seen for followup. \par \par She has known valvulopathy with postprocedural mitral stenosis and mitral regurgitation post mitral clip. Prior mechanical aVR. Pulmonary hypertension.\par \par \par She had an elevated INR and Coumadin has been held being rechecked today in the office. No bleeding.\par \par Feel she is a little more short of breath than before rest frequently. No chest pain. Is aware of edema. Has been very compliant with low sodium diet.\par \par \par \par \par \par \par \par \par \par \par

## 2019-05-23 NOTE — REVIEW OF SYSTEMS
[Fever] : no fever [Recent Weight Gain (___ Lbs)] : no recent weight gain [Chills] : no chills [Feeling Fatigued] : feeling fatigued [Recent Weight Loss (___ Lbs)] : no recent weight loss [Eyeglasses] : currently wearing eyeglasses [Loss Of Hearing] : hearing loss [Dyspnea on exertion] : not dyspnea during exertion [Chest Pain] : no chest pain [Lower Ext Edema] : no extremity edema [Palpitations] : no palpitations [see HPI] : see HPI [Abdominal Pain] : no abdominal pain [Heartburn] : no heartburn [Change in Appetite] : change in appetite [Joint Pain] : joint pain [Skin Lesions] : skin lesion(s): [Dizziness] : dizziness [Anxiety] : anxiety [Easy Bleeding] : a tendency for easy bleeding [Easy Bruising] : a tendency for easy bruising [Negative] : Heme/Lymph

## 2019-05-23 NOTE — DISCUSSION/SUMMARY
[Patient] : the patient [Risks] : risks [Benefits] : benefits [Alternatives] : alternatives [___ Week(s)] : [unfilled] week(s) [FreeTextEntry1] : We'll arrange home draw chemistries as well as continued INR monitoring. Instructions given regarding Coumadin which she will hold for another 2 days and then resume a smaller dose recheck INR on Tuesday.\par Will prescribe Zaroxolyn due to increasing edema and CHF followup in a few weeks.

## 2019-05-23 NOTE — ASSESSMENT
[FreeTextEntry1] : 89-year-old woman with valvulopathy prior aVR mechanical, A. fib, hypertension, chronic heart failure. Mitral stenosis and mitral regurgitation status post mitral valve clipped. Elevated INR probably related to low vitamin K intake.

## 2019-05-24 LAB
INR PPP: 4.7 RATIO
POCT-PROTHROMBIN TIME: 56.9 SECS
QUALITY CONTROL: YES

## 2019-05-28 ENCOUNTER — APPOINTMENT (OUTPATIENT)
Dept: CARDIOLOGY | Facility: CLINIC | Age: 84
End: 2019-05-28

## 2019-05-30 ENCOUNTER — LABORATORY RESULT (OUTPATIENT)
Age: 84
End: 2019-05-30

## 2019-05-30 ENCOUNTER — RX RENEWAL (OUTPATIENT)
Age: 84
End: 2019-05-30

## 2019-05-30 RX ORDER — MIRTAZAPINE 15 MG/1
15 TABLET, FILM COATED ORAL
Qty: 30 | Refills: 0 | Status: ACTIVE | COMMUNITY
Start: 2019-02-22 | End: 1900-01-01

## 2019-06-04 ENCOUNTER — APPOINTMENT (OUTPATIENT)
Dept: CARDIOLOGY | Facility: CLINIC | Age: 84
End: 2019-06-04
Payer: MEDICARE

## 2019-06-04 ENCOUNTER — APPOINTMENT (OUTPATIENT)
Dept: INTERNAL MEDICINE | Facility: CLINIC | Age: 84
End: 2019-06-04
Payer: MEDICARE

## 2019-06-04 ENCOUNTER — INPATIENT (INPATIENT)
Facility: HOSPITAL | Age: 84
LOS: 16 days | Discharge: HOME | DRG: 314 | End: 2019-06-21
Attending: INTERNAL MEDICINE | Admitting: HOSPITALIST
Payer: MEDICARE

## 2019-06-04 VITALS
HEART RATE: 90 BPM | BODY MASS INDEX: 21.67 KG/M2 | TEMPERATURE: 97.2 F | OXYGEN SATURATION: 97 % | WEIGHT: 143 LBS | HEIGHT: 68 IN | SYSTOLIC BLOOD PRESSURE: 87 MMHG | RESPIRATION RATE: 18 BRPM | DIASTOLIC BLOOD PRESSURE: 58 MMHG

## 2019-06-04 VITALS
OXYGEN SATURATION: 82 % | TEMPERATURE: 98 F | WEIGHT: 138.01 LBS | HEART RATE: 119 BPM | DIASTOLIC BLOOD PRESSURE: 57 MMHG | RESPIRATION RATE: 20 BRPM | HEIGHT: 68 IN | SYSTOLIC BLOOD PRESSURE: 85 MMHG

## 2019-06-04 DIAGNOSIS — E78.00 PURE HYPERCHOLESTEROLEMIA, UNSPECIFIED: ICD-10-CM

## 2019-06-04 DIAGNOSIS — I48.91 UNSPECIFIED ATRIAL FIBRILLATION: ICD-10-CM

## 2019-06-04 DIAGNOSIS — I35.9 NONRHEUMATIC AORTIC VALVE DISORDER, UNSPECIFIED: ICD-10-CM

## 2019-06-04 DIAGNOSIS — Z86.69 PERSONAL HISTORY OF OTHER DISEASES OF THE NERVOUS SYSTEM AND SENSE ORGANS: Chronic | ICD-10-CM

## 2019-06-04 DIAGNOSIS — L98.499 NON-PRESSURE CHRONIC ULCER OF SKIN OF OTHER SITES WITH UNSPECIFIED SEVERITY: ICD-10-CM

## 2019-06-04 DIAGNOSIS — D64.9 ANEMIA, UNSPECIFIED: ICD-10-CM

## 2019-06-04 DIAGNOSIS — E87.6 HYPOKALEMIA: ICD-10-CM

## 2019-06-04 DIAGNOSIS — L03.116 CELLULITIS OF LEFT LOWER LIMB: ICD-10-CM

## 2019-06-04 DIAGNOSIS — M84.372A STRESS FRACTURE, LEFT ANKLE, INITIAL ENCOUNTER FOR FRACTURE: Chronic | ICD-10-CM

## 2019-06-04 DIAGNOSIS — I10 ESSENTIAL (PRIMARY) HYPERTENSION: ICD-10-CM

## 2019-06-04 DIAGNOSIS — Z95.4 PRESENCE OF OTHER HEART-VALVE REPLACEMENT: Chronic | ICD-10-CM

## 2019-06-04 DIAGNOSIS — I65.29 OCCLUSION AND STENOSIS OF UNSPECIFIED CAROTID ARTERY: ICD-10-CM

## 2019-06-04 DIAGNOSIS — F41.9 ANXIETY DISORDER, UNSPECIFIED: ICD-10-CM

## 2019-06-04 DIAGNOSIS — Z98.890 OTHER SPECIFIED POSTPROCEDURAL STATES: Chronic | ICD-10-CM

## 2019-06-04 DIAGNOSIS — N17.9 ACUTE KIDNEY FAILURE, UNSPECIFIED: ICD-10-CM

## 2019-06-04 DIAGNOSIS — I50.32 CHRONIC DIASTOLIC (CONGESTIVE) HEART FAILURE: ICD-10-CM

## 2019-06-04 LAB
ALBUMIN SERPL ELPH-MCNC: 2.4 G/DL — LOW (ref 3.3–5)
ALP SERPL-CCNC: 183 U/L — HIGH (ref 40–120)
ALT FLD-CCNC: 33 U/L DA — SIGNIFICANT CHANGE UP (ref 10–45)
ANION GAP SERPL CALC-SCNC: 10 MMOL/L — SIGNIFICANT CHANGE UP (ref 5–17)
APPEARANCE UR: CLEAR — SIGNIFICANT CHANGE UP
APTT BLD: 35.1 SEC — SIGNIFICANT CHANGE UP (ref 27.5–36.3)
AST SERPL-CCNC: 34 U/L — SIGNIFICANT CHANGE UP (ref 10–40)
BASOPHILS # BLD AUTO: 0.03 K/UL — SIGNIFICANT CHANGE UP (ref 0–0.2)
BASOPHILS NFR BLD AUTO: 0.3 % — SIGNIFICANT CHANGE UP (ref 0–2)
BILIRUB SERPL-MCNC: 1.6 MG/DL — HIGH (ref 0.2–1.2)
BILIRUB UR-MCNC: NEGATIVE — SIGNIFICANT CHANGE UP
BUN SERPL-MCNC: 60 MG/DL — HIGH (ref 7–23)
CALCIUM SERPL-MCNC: 9.3 MG/DL — SIGNIFICANT CHANGE UP (ref 8.4–10.5)
CHLORIDE SERPL-SCNC: 94 MMOL/L — LOW (ref 96–108)
CO2 SERPL-SCNC: 32 MMOL/L — HIGH (ref 22–31)
COLOR SPEC: YELLOW — SIGNIFICANT CHANGE UP
CREAT SERPL-MCNC: 2.66 MG/DL — HIGH (ref 0.5–1.3)
DIFF PNL FLD: ABNORMAL
EOSINOPHIL # BLD AUTO: 0.01 K/UL — SIGNIFICANT CHANGE UP (ref 0–0.5)
EOSINOPHIL NFR BLD AUTO: 0.1 % — SIGNIFICANT CHANGE UP (ref 0–6)
GLUCOSE SERPL-MCNC: 102 MG/DL — HIGH (ref 70–99)
GLUCOSE UR QL: NEGATIVE — SIGNIFICANT CHANGE UP
HCT VFR BLD CALC: 39.6 % — SIGNIFICANT CHANGE UP (ref 34.5–45)
HGB BLD-MCNC: 13.1 G/DL — SIGNIFICANT CHANGE UP (ref 11.5–15.5)
IMM GRANULOCYTES NFR BLD AUTO: 0.7 % — SIGNIFICANT CHANGE UP (ref 0–1.5)
INR BLD: 2.04 RATIO — HIGH (ref 0.88–1.16)
INR PPP: 2.2 RATIO
KETONES UR-MCNC: NEGATIVE — SIGNIFICANT CHANGE UP
LACTATE SERPL-SCNC: 2 MMOL/L — SIGNIFICANT CHANGE UP (ref 0.7–2)
LACTATE SERPL-SCNC: 2.2 MMOL/L — HIGH (ref 0.7–2)
LACTATE SERPL-SCNC: 2.4 MMOL/L — HIGH (ref 0.7–2)
LACTATE SERPL-SCNC: 2.9 MMOL/L — HIGH (ref 0.7–2)
LEUKOCYTE ESTERASE UR-ACNC: NEGATIVE — SIGNIFICANT CHANGE UP
LYMPHOCYTES # BLD AUTO: 0.56 K/UL — LOW (ref 1–3.3)
LYMPHOCYTES # BLD AUTO: 5.3 % — LOW (ref 13–44)
MCHC RBC-ENTMCNC: 30.6 PG — SIGNIFICANT CHANGE UP (ref 27–34)
MCHC RBC-ENTMCNC: 33.1 GM/DL — SIGNIFICANT CHANGE UP (ref 32–36)
MCV RBC AUTO: 92.5 FL — SIGNIFICANT CHANGE UP (ref 80–100)
MONOCYTES # BLD AUTO: 0.51 K/UL — SIGNIFICANT CHANGE UP (ref 0–0.9)
MONOCYTES NFR BLD AUTO: 4.9 % — SIGNIFICANT CHANGE UP (ref 2–14)
NEUTROPHILS # BLD AUTO: 9.33 K/UL — HIGH (ref 1.8–7.4)
NEUTROPHILS NFR BLD AUTO: 88.7 % — HIGH (ref 43–77)
NITRITE UR-MCNC: NEGATIVE — SIGNIFICANT CHANGE UP
NRBC # BLD: 0 /100 WBCS — SIGNIFICANT CHANGE UP (ref 0–0)
PH UR: 7 — SIGNIFICANT CHANGE UP (ref 5–8)
PLATELET # BLD AUTO: 196 K/UL — SIGNIFICANT CHANGE UP (ref 150–400)
POCT-PROTHROMBIN TIME: 26 SECS
POTASSIUM SERPL-MCNC: 3 MMOL/L — LOW (ref 3.5–5.3)
POTASSIUM SERPL-SCNC: 3 MMOL/L — LOW (ref 3.5–5.3)
PROT SERPL-MCNC: 7.3 G/DL — SIGNIFICANT CHANGE UP (ref 6–8.3)
PROT UR-MCNC: 100
PROTHROM AB SERPL-ACNC: 23.4 SEC — HIGH (ref 10–12.9)
QUALITY CONTROL: YES
RBC # BLD: 4.28 M/UL — SIGNIFICANT CHANGE UP (ref 3.8–5.2)
RBC # FLD: 21.2 % — HIGH (ref 10.3–14.5)
SODIUM SERPL-SCNC: 136 MMOL/L — SIGNIFICANT CHANGE UP (ref 135–145)
SP GR SPEC: 1 — LOW (ref 1.01–1.02)
UROBILINOGEN FLD QL: NEGATIVE — SIGNIFICANT CHANGE UP
WBC # BLD: 10.51 K/UL — HIGH (ref 3.8–10.5)
WBC # FLD AUTO: 10.51 K/UL — HIGH (ref 3.8–10.5)

## 2019-06-04 PROCEDURE — 85610 PROTHROMBIN TIME: CPT | Mod: QW

## 2019-06-04 PROCEDURE — 99214 OFFICE O/P EST MOD 30 MIN: CPT

## 2019-06-04 PROCEDURE — 93971 EXTREMITY STUDY: CPT | Mod: 26,LT

## 2019-06-04 PROCEDURE — 99285 EMERGENCY DEPT VISIT HI MDM: CPT

## 2019-06-04 PROCEDURE — 99223 1ST HOSP IP/OBS HIGH 75: CPT

## 2019-06-04 PROCEDURE — 93010 ELECTROCARDIOGRAM REPORT: CPT

## 2019-06-04 PROCEDURE — 99211 OFF/OP EST MAY X REQ PHY/QHP: CPT | Mod: 25

## 2019-06-04 PROCEDURE — 73590 X-RAY EXAM OF LOWER LEG: CPT | Mod: 26,LT

## 2019-06-04 PROCEDURE — 71045 X-RAY EXAM CHEST 1 VIEW: CPT | Mod: 26

## 2019-06-04 RX ORDER — FUROSEMIDE 80 MG/1
80 TABLET ORAL
Refills: 0 | Status: ACTIVE | COMMUNITY

## 2019-06-04 RX ORDER — ESCITALOPRAM OXALATE 10 MG/1
5 TABLET, FILM COATED ORAL DAILY
Refills: 0 | Status: DISCONTINUED | OUTPATIENT
Start: 2019-06-04 | End: 2019-06-21

## 2019-06-04 RX ORDER — WARFARIN SODIUM 2.5 MG/1
5 TABLET ORAL ONCE
Refills: 0 | Status: COMPLETED | OUTPATIENT
Start: 2019-06-04 | End: 2019-06-04

## 2019-06-04 RX ORDER — SODIUM CHLORIDE 9 MG/ML
1000 INJECTION INTRAMUSCULAR; INTRAVENOUS; SUBCUTANEOUS
Refills: 0 | Status: DISCONTINUED | OUTPATIENT
Start: 2019-06-04 | End: 2019-06-05

## 2019-06-04 RX ORDER — DIGOXIN 250 MCG
0.12 TABLET ORAL DAILY
Refills: 0 | Status: DISCONTINUED | OUTPATIENT
Start: 2019-06-04 | End: 2019-06-05

## 2019-06-04 RX ORDER — SODIUM CHLORIDE 9 MG/ML
1950 INJECTION INTRAMUSCULAR; INTRAVENOUS; SUBCUTANEOUS ONCE
Refills: 0 | Status: COMPLETED | OUTPATIENT
Start: 2019-06-04 | End: 2019-06-04

## 2019-06-04 RX ORDER — VANCOMYCIN HCL 1 G
1000 VIAL (EA) INTRAVENOUS DAILY
Refills: 0 | Status: DISCONTINUED | OUTPATIENT
Start: 2019-06-04 | End: 2019-06-05

## 2019-06-04 RX ORDER — ASPIRIN/CALCIUM CARB/MAGNESIUM 324 MG
81 TABLET ORAL DAILY
Refills: 0 | Status: DISCONTINUED | OUTPATIENT
Start: 2019-06-04 | End: 2019-06-21

## 2019-06-04 RX ORDER — MIRTAZAPINE 45 MG/1
15 TABLET, ORALLY DISINTEGRATING ORAL AT BEDTIME
Refills: 0 | Status: DISCONTINUED | OUTPATIENT
Start: 2019-06-04 | End: 2019-06-21

## 2019-06-04 RX ORDER — SODIUM CHLORIDE 9 MG/ML
1000 INJECTION INTRAMUSCULAR; INTRAVENOUS; SUBCUTANEOUS
Refills: 0 | Status: DISCONTINUED | OUTPATIENT
Start: 2019-06-04 | End: 2019-06-04

## 2019-06-04 RX ORDER — ATORVASTATIN CALCIUM 80 MG/1
80 TABLET, FILM COATED ORAL AT BEDTIME
Refills: 0 | Status: DISCONTINUED | OUTPATIENT
Start: 2019-06-04 | End: 2019-06-21

## 2019-06-04 RX ORDER — METOPROLOL TARTRATE 50 MG
100 TABLET ORAL DAILY
Refills: 0 | Status: DISCONTINUED | OUTPATIENT
Start: 2019-06-04 | End: 2019-06-05

## 2019-06-04 RX ORDER — VANCOMYCIN HCL 1 G
1000 VIAL (EA) INTRAVENOUS ONCE
Refills: 0 | Status: COMPLETED | OUTPATIENT
Start: 2019-06-04 | End: 2019-06-04

## 2019-06-04 RX ORDER — POTASSIUM CHLORIDE 20 MEQ
10 PACKET (EA) ORAL DAILY
Refills: 0 | Status: DISCONTINUED | OUTPATIENT
Start: 2019-06-04 | End: 2019-06-05

## 2019-06-04 RX ORDER — PANTOPRAZOLE SODIUM 20 MG/1
40 TABLET, DELAYED RELEASE ORAL
Refills: 0 | Status: DISCONTINUED | OUTPATIENT
Start: 2019-06-04 | End: 2019-06-13

## 2019-06-04 RX ORDER — FUROSEMIDE 40 MG
80 TABLET ORAL DAILY
Refills: 0 | Status: DISCONTINUED | OUTPATIENT
Start: 2019-06-04 | End: 2019-06-04

## 2019-06-04 RX ORDER — POTASSIUM CHLORIDE 20 MEQ
40 PACKET (EA) ORAL ONCE
Refills: 0 | Status: COMPLETED | OUTPATIENT
Start: 2019-06-04 | End: 2019-06-04

## 2019-06-04 RX ORDER — VANCOMYCIN HCL 1 G
1000 VIAL (EA) INTRAVENOUS EVERY 12 HOURS
Refills: 0 | Status: DISCONTINUED | OUTPATIENT
Start: 2019-06-04 | End: 2019-06-04

## 2019-06-04 RX ADMIN — Medication 40 MILLIEQUIVALENT(S): at 14:10

## 2019-06-04 RX ADMIN — ATORVASTATIN CALCIUM 80 MILLIGRAM(S): 80 TABLET, FILM COATED ORAL at 21:37

## 2019-06-04 RX ADMIN — Medication 250 MILLIGRAM(S): at 13:24

## 2019-06-04 RX ADMIN — SODIUM CHLORIDE 150 MILLILITER(S): 9 INJECTION INTRAMUSCULAR; INTRAVENOUS; SUBCUTANEOUS at 16:49

## 2019-06-04 RX ADMIN — WARFARIN SODIUM 5 MILLIGRAM(S): 2.5 TABLET ORAL at 21:37

## 2019-06-04 RX ADMIN — SODIUM CHLORIDE 1950 MILLILITER(S): 9 INJECTION INTRAMUSCULAR; INTRAVENOUS; SUBCUTANEOUS at 13:25

## 2019-06-04 RX ADMIN — MIRTAZAPINE 15 MILLIGRAM(S): 45 TABLET, ORALLY DISINTEGRATING ORAL at 21:37

## 2019-06-04 RX ADMIN — SODIUM CHLORIDE 1950 MILLILITER(S): 9 INJECTION INTRAMUSCULAR; INTRAVENOUS; SUBCUTANEOUS at 15:30

## 2019-06-04 NOTE — HISTORY OF PRESENT ILLNESS
[Rash (Location) ___] : rash on [unfilled] [Moderate] : moderate [___ Days ago] : [unfilled] days ago [Constant] : constant [Cold Compresses] : cold compresses [Worsening] : worsening [Activity] : with activity [Formal Caregiver] : formal caregiver [FreeTextEntry8] : 89-year-old woman with a history of anemia atrial fibrillation pulmonary hypertension with right-sided congestive heart failure hyperlipidemia hypertension and carotid stenosis comes to the office acutely with 3 days of swelling pain and erythema of her left foot and ankle patient denies temperature chills sweats or myalgias she denies headache sinus congestion sore throat cough wheezing pleurisy chest pain shortness of breath she has mild exertional dyspnea she denies abdominal pain nausea vomiting diarrhea or constipation bright red blood per rectum or black stools her appetite has been good her weight has been stable he did have a slight fall prior to this swollen and tender foot

## 2019-06-04 NOTE — H&P ADULT - ASSESSMENT
89W PMH atrial fibrillation, chronic diastolic heart failue, GERD, s/p mechanical valve replacement, MGUS, anxiety presents for left lower extremity cellulitis.    IMPROVE VTE Individual Risk Assessment          RISK                                                          Points  [  ] Previous VTE                                                3  [  ] Thrombophilia                                             2  [  ] Lower limb paralysis                                   2        (unable to hold up >15 seconds)    [  ] Current Cancer                                             2         (within 6 months)  [X  ] Immobilization > 24 hrs                              1  [  ] ICU/CCU stay > 24 hours                             1  [ X ] Age > 60                                                         1    IMPROVE VTE Score:         [   2      ]    Total Risk Factor Score:    0 - 1:   Consider IPC  >2 - 3:  Thromboprophylaxis required (enoxaparin or SQ heparin)        >4:   High Risk: Thromboprophylaxis required (enoxaparin or SQ heparin), optional add IPC  **If CONTRAINDICATION to enoxaparin or SQ heparin, USE IPCs**

## 2019-06-04 NOTE — ED PROVIDER NOTE - OBJECTIVE STATEMENT
89 y/o F h/o Afib, AVR, MVR sent in by PMD for evaluation to B/L LE cellulitis. States she started with a scratch to the upper left anterior shin approx 1 week ago and redness started thereafter worsening x 3 days. Also states ulceration to the right anterior mid shin with increased redness surrounding it. Able to ambulate. Lives at home with a home health aid. Denies fever, chills, nausea, vomiting, weakness, numbness, tingling, chest pain, shortness of breath. Appears VERY well.

## 2019-06-04 NOTE — ED PROVIDER NOTE - CARE PLAN
Principal Discharge DX:	Cellulitis of left leg Principal Discharge DX:	Cellulitis of left leg  Secondary Diagnosis:	Acute renal failure, unspecified acute renal failure type  Secondary Diagnosis:	Hypokalemia

## 2019-06-04 NOTE — H&P ADULT - NSICDXPASTSURGICALHX_GEN_ALL_CORE_FT
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

## 2019-06-04 NOTE — HEALTH RISK ASSESSMENT
[No falls in past year] : Patient reported no falls in the past year [0] : 1) Little interest or pleasure doing things: Not at all (0) [[id number]] : 0

## 2019-06-04 NOTE — H&P ADULT - HISTORY OF PRESENT ILLNESS
89W PMH atrial fibrillation, chronic diastolic heart failue, GERD, s/p mechanical valve replacement, MGUS, anxiety presents for left lower extremity cellulitis.   Patient states it has been an ongoing issue without resolution outpatient.      c/o chills, subjective fever, no pain leg.

## 2019-06-04 NOTE — PATIENT PROFILE ADULT - NSASFALLNEEDSASSISTWITH_GEN_A_NUR
s/p fall hit his face at the coffee table no loc ,nose bleed/swollen lip/fall standing/walking/toileting

## 2019-06-04 NOTE — H&P ADULT - NSHPLABSRESULTS_GEN_ALL_CORE
LABS:                        13.1   10.51 )-----------( 196      ( 04 Jun 2019 13:15 )             39.6     06-04    136  |  94<L>  |  60<H>  ----------------------------<  102<H>  3.0<L>   |  32<H>  |  2.66<H>    Ca    9.3      04 Jun 2019 13:15    TPro  7.3  /  Alb  2.4<L>  /  TBili  1.6<H>  /  DBili  x   /  AST  34  /  ALT  33  /  AlkPhos  183<H>  06-04    PT/INR - ( 04 Jun 2019 13:15 )   PT: 23.4 sec;   INR: 2.04 ratio       PTT - ( 04 Jun 2019 13:15 )  PTT:35.1 sec     CAPILLARY BLOOD GLUCOSE    RADIOLOGY & ADDITIONAL TESTS:    Consultant(s) Notes Reviewed:  [x ] YES  [ ] NO  Care Discussed with Consultants/Other Providers [ x] YES  [ ] NO  Imaging Personally Reviewed:  [ ] YES  [ ] NO

## 2019-06-04 NOTE — ASSESSMENT
[FreeTextEntry1] : Physical exam shows an elderly woman in no acute distress blood pressure 90/60 temperature 97.2°F orally heart rate 90 respiratory rate of 15 HEENT was unremarkable chest was clear to auscultation and percussion cardiovascular exam showed a normal S1 and S2 with a 2/6 systolic murmur abdomen was soft extremities the left foot was swollen and tender and hot compatible with cellulitis in view of her age and poor circulation sending to the ER for admission IV antibiotics and x-rays

## 2019-06-04 NOTE — ED ADULT NURSE NOTE - OBJECTIVE STATEMENT
89 yr old femaleto ED A&Ox4 presents with +LLE swelling, pain, and redness. Pt was sent by Dr Russo for further evaluation. Pt denies any pain. +redness to b/l le. LLE with increased swelling and redness. +skin tear noted to RLE. Pt is afebrile rectally 97.7. No acute resp distress noted. Resp even and unlabored. Abd soft, NT, ND. +BS. +PP. RUSSELL. Skin warm and dry. +redness noted to sacral area. Family at bedside. Safety maintained.

## 2019-06-04 NOTE — ED PROVIDER NOTE - PROGRESS NOTE DETAILS
PA Tiberio- hypokalemia noted- PO supplementation given. EKG unchanged. Tib/fix xray- no osteo. RFT's doubled since last admission. Will admit for IV ABX, and treatment of HENRY. Case discussed with Arnold Siddiqui and accepted to med/surg.

## 2019-06-04 NOTE — ED PROVIDER NOTE - ATTENDING CONTRIBUTION TO CARE
Dr. Graves: I performed a face to face bedside interview with patient regarding history of present illness, review of symptoms and past medical history. I completed an independent physical exam.  I have discussed patient's plan of care with PA.   I agree with note as stated above, having amended the EMR as needed to reflect my findings.   This includes HISTORY OF PRESENT ILLNESS, HIV, PAST MEDICAL/SURGICAL/FAMILY/SOCIAL HISTORY, ALLERGIES AND HOME MEDICATIONS, REVIEW OF SYSTEMS, PHYSICAL EXAM, and any PROGRESS NOTES during the time I functioned as the attending physician for this patient.    see mdm

## 2019-06-04 NOTE — H&P ADULT - NSICDXPASTMEDICALHX_GEN_ALL_CORE_FT
PAST MEDICAL HISTORY:  Afib     Anxiety     Aortic valve disease s/p AVR mechanical    Diverticulosis of small intestine without hemorrhage     Essential hypertension     HLD (hyperlipidemia)     Melanoma     Non-rheumatic mitral regurgitation s/p mitral clip

## 2019-06-04 NOTE — ED PROVIDER NOTE - CLINICAL SUMMARY MEDICAL DECISION MAKING FREE TEXT BOX
Dr. Graves: 89F h/o Afib on coumadin, AVR, MVR sent in by [facility] for admission for LLE cellulitis x 3 days, no fevers or chills, no po abx. On exam pt is well appearing, nad, dry mucosa, rrr, ctab, abdo soft/nt/nd, LLE with erythema and warmth up to distal tib/fib. Pt given vanco for cellulitis, sepsis fluids initiated as pt initially tachy and hypotensive in triage. On labs pt also found to have ARF (will continue IVF) and hypokalemia (given po K). Will admit to medicien.

## 2019-06-04 NOTE — PHYSICAL EXAM
[Well Nourished] : well nourished [No Acute Distress] : no acute distress [Well Developed] : well developed [Well-Appearing] : well-appearing [Normal Voice/Communication] : normal voice/communication [Normal Sclera/Conjunctiva] : normal sclera/conjunctiva [EOMI] : extraocular movements intact [PERRL] : pupils equal round and reactive to light [Normal TMs] : both tympanic membranes were normal [Normal Outer Ear/Nose] : the outer ears and nose were normal in appearance [Normal Oropharynx] : the oropharynx was normal [No JVD] : no jugular venous distention [Normal Nasal Mucosa] : the nasal mucosa was normal [Supple] : supple [Thyroid Normal, No Nodules] : the thyroid was normal and there were no nodules present [No Lymphadenopathy] : no lymphadenopathy [No Accessory Muscle Use] : no accessory muscle use [No Respiratory Distress] : no respiratory distress  [Normal Percussion] : the chest was normal to percussion [Normal Rate] : normal rate  [Normal S1, S2] : normal S1 and S2 [Regular Rhythm] : with a regular rhythm [No Abdominal Bruit] : a ~M bruit was not heard ~T in the abdomen [No Carotid Bruits] : no carotid bruits [No Varicosities] : no varicosities [Pedal Pulses Present] : the pedal pulses are present [No Edema] : there was no peripheral edema [No Extremity Clubbing/Cyanosis] : no extremity clubbing/cyanosis [No Palpable Aorta] : no palpable aorta [Declined Breast Exam] : declined breast exam  [Soft] : abdomen soft [Non Tender] : non-tender [Non-distended] : non-distended [No Masses] : no abdominal mass palpated [No Hernias] : no hernias [Normal Bowel Sounds] : normal bowel sounds [Declined Rectal Exam] : declined rectal exam [Normal Supraclavicular Nodes] : no supraclavicular lymphadenopathy [Normal Axillary Nodes] : no axillary lymphadenopathy [Normal Posterior Cervical Nodes] : no posterior cervical lymphadenopathy [Normal Femoral Nodes] : no femoral lymphadenopathy [Normal Anterior Cervical Nodes] : no anterior cervical lymphadenopathy [Normal Inguinal Nodes] : no inguinal lymphadenopathy [No Spinal Tenderness] : no spinal tenderness [No CVA Tenderness] : no CVA  tenderness [Kyphosis] : no kyphosis [Scoliosis] : no scoliosis [Grossly Normal Strength/Tone] : grossly normal strength/tone [No Joint Swelling] : no joint swelling [No Skin Lesions] : no skin lesions [Acne] : no acne [Normal Gait] : normal gait [Coordination Grossly Intact] : coordination grossly intact [Deep Tendon Reflexes (DTR)] : deep tendon reflexes were 2+ and symmetric [No Focal Deficits] : no focal deficits [Speech Grossly Normal] : speech grossly normal [Memory Grossly Normal] : memory grossly normal [Normal Affect] : the affect was normal [Alert and Oriented x3] : oriented to person, place, and time [Normal Mood] : the mood was normal [Normal Insight/Judgement] : insight and judgment were intact [de-identified] :  decreased b/s 1/3 on right 1/4 on left [de-identified] : 2/6 systolic [de-identified] : liver edge 2-3 cm below right costal margin [de-identified] : cellulitis left ft and ankle

## 2019-06-04 NOTE — ED PROVIDER NOTE - PCP FREE TEXT FOR MDM DISCUSSED CASE WITH QUESTION
Farida Farida - discussed possible outpt treatment but Dr. Russo requests admission as pt has been resistant to outpt treatment for cellulitis in the past

## 2019-06-04 NOTE — ED PROVIDER NOTE - LOWER EXTREMITY EXAM, LEFT
abrasion just distal to the knee on the anterior shin with erythema extending mid shin to distal foot. No calf tenderness, no palpable cords

## 2019-06-04 NOTE — REVIEW OF SYSTEMS
[Fever] : no fever [Chills] : no chills [Hot Flashes] : no hot flashes [Fatigue] : fatigue [Night Sweats] : no night sweats [Recent Change In Weight] : ~T no recent weight change [Discharge] : no discharge [Redness] : no redness [Pain] : no pain [Dryness] : no dryness  [Vision Problems] : no vision problems [Itching] : no itching [Earache] : no earache [Nosebleed] : no nosebleeds [Hearing Loss] : no hearing loss [Nasal Discharge] : no nasal discharge [Sore Throat] : no sore throat [Hoarseness] : no hoarseness [Chest Pain] : no chest pain [Postnasal Drip] : no postnasal drip [Palpitations] : no palpitations [Leg Claudication] : no leg claudication [Lower Ext Edema] : no lower extremity edema [Orthopnea] : no orthopnea [Shortness Of Breath] : no shortness of breath [Paroysmal Nocturnal Dyspnea] : no paroysmal nocturnal dyspnea [Wheezing] : no wheezing [Cough] : no cough [Abdominal Pain] : no abdominal pain [Dyspnea on Exertion] : dyspnea on exertion [Constipation] : constipation [Nausea] : nausea [Vomiting] : no vomiting [Diarrhea] : diarrhea [Melena] : no melena [Dysuria] : no dysuria [Heartburn] : heartburn [Incontinence] : no incontinence [Hematuria] : no hematuria [Poor Libido] : libido not poor [Nocturia] : nocturia [Vaginal Discharge] : no vaginal discharge [Frequency] : frequency [Joint Pain] : no joint pain [Muscle Weakness] : no muscle weakness [Joint Swelling] : no joint swelling [Joint Stiffness] : joint stiffness [Back Pain] : no back pain [Muscle Pain] : no muscle pain [Mole Changes] : no mole changes [Nail Changes] : no nail changes [Hair Changes] : no hair changes [Headache] : no headache [Skin Rash] : skin rash [Fainting] : no fainting [Dizziness] : no dizziness [Confusion] : no confusion [Memory Loss] : no memory loss [Unsteady Walking] : ataxia [Suicidal] : not suicidal [Insomnia] : no insomnia [Anxiety] : no anxiety [Depression] : no depression [Easy Bruising] : no easy bruising [Easy Bleeding] : no easy bleeding [Swollen Glands] : no swollen glands [FreeTextEntry2] : loss 5-7 lbs [de-identified] : left foot and ankle swollen hot and reddened

## 2019-06-04 NOTE — ED PROVIDER NOTE - PSH
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

## 2019-06-04 NOTE — H&P ADULT - NSHPPHYSICALEXAM_GEN_ALL_CORE
T(C): 36.5 (06-04-19 @ 13:25), Max: 36.5 (06-04-19 @ 13:25)  HR: 60 (06-04-19 @ 14:03) (56 - 119)  BP: 96/50 (06-04-19 @ 14:03) (85/57 - 99/52)  RR: 22 (06-04-19 @ 14:03) (18 - 22)  SpO2: 96% (06-04-19 @ 14:03) (82% - 100%)  Wt(kg): --Vital Signs Last 24 Hrs  T(C): 36.5 (04 Jun 2019 13:25), Max: 36.5 (04 Jun 2019 13:25)  T(F): 97.7 (04 Jun 2019 13:25), Max: 97.7 (04 Jun 2019 13:25)  HR: 60 (04 Jun 2019 14:03) (56 - 119)  BP: 96/50 (04 Jun 2019 14:03) (85/57 - 99/52)  BP(mean): --  RR: 22 (04 Jun 2019 14:03) (18 - 22)  SpO2: 96% (04 Jun 2019 14:03) (82% - 100%)    PHYSICAL EXAM:  GENERAL: NAD, well-groomed, well-developed  HEAD:  Atraumatic, Normocephalic  EYES: EOMI, PERRLA, conjunctiva and sclera clear  ENMT: No tonsillar erythema, exudates, or enlargement; Moist mucous membranes, Good dentition, No lesions  NECK: Supple, No JVD, Normal thyroid  NERVOUS SYSTEM:  Alert & Oriented X3, Good concentration; Motor Strength 5/5 B/L upper and lower extremities; DTRs 2+ intact and symmetric  CHEST/LUNG: Clear to percussion bilaterally; No rales, rhonchi, wheezing, or rubs  HEART: Regular rate and rhythm; No murmurs, rubs, or gallops  ABDOMEN: Soft, Nontender, Nondistended; Bowel sounds present  EXTREMITIES:  left lower extremity redness, swelling, and open wound on left lateral calf  LYMPH: No lymphadenopathy noted  SKIN: No rashes or lesions T(C): 36.5 (06-04-19 @ 13:25), Max: 36.5 (06-04-19 @ 13:25)  HR: 60 (06-04-19 @ 14:03) (56 - 119)  BP: 96/50 (06-04-19 @ 14:03) (85/57 - 99/52)  RR: 22 (06-04-19 @ 14:03) (18 - 22)  SpO2: 96% (06-04-19 @ 14:03) (82% - 100%)  Wt(kg): --Vital Signs Last 24 Hrs  T(C): 36.5 (04 Jun 2019 13:25), Max: 36.5 (04 Jun 2019 13:25)  T(F): 97.7 (04 Jun 2019 13:25), Max: 97.7 (04 Jun 2019 13:25)  HR: 60 (04 Jun 2019 14:03) (56 - 119)  BP: 96/50 (04 Jun 2019 14:03) (85/57 - 99/52)  BP(mean): --  RR: 22 (04 Jun 2019 14:03) (18 - 22)  SpO2: 96% (04 Jun 2019 14:03) (82% - 100%)    PHYSICAL EXAM:  GENERAL: NAD, well-groomed, well-developed  HEAD:  Atraumatic, Normocephalic  EYES: EOMI, PERRLA, conjunctiva and sclera clear  ENMT: No tonsillar erythema, exudates, or enlargement; Moist mucous membranes, Good dentition, No lesions  NECK: Supple, No JVD, Normal thyroid  NERVOUS SYSTEM:  Alert & Oriented X3, Good concentration; Motor Strength 5/5 B/L upper and lower extremities; DTRs 2+ intact and symmetric  CHEST/LUNG: Clear to percussion bilaterally; No rales, rhonchi, wheezing, or rubs  HEART: Regular rate and rhythm; No murmurs, rubs, or gallops  ABDOMEN: Soft, Nontender, Nondistended; Bowel sounds present  EXTREMITIES:  left lower extremity redness, swelling, and open wound on left lateral calf, erythema up to mid calves  LYMPH: No lymphadenopathy noted  SKIN: No rashes or lesions

## 2019-06-05 LAB
ANION GAP SERPL CALC-SCNC: 10 MMOL/L — SIGNIFICANT CHANGE UP (ref 5–17)
ANION GAP SERPL CALC-SCNC: 8 MMOL/L — SIGNIFICANT CHANGE UP (ref 5–17)
BASOPHILS # BLD AUTO: 0.03 K/UL — SIGNIFICANT CHANGE UP (ref 0–0.2)
BASOPHILS NFR BLD AUTO: 0.3 % — SIGNIFICANT CHANGE UP (ref 0–2)
BUN SERPL-MCNC: 44 MG/DL — HIGH (ref 7–23)
BUN SERPL-MCNC: 48 MG/DL — HIGH (ref 7–23)
CALCIUM SERPL-MCNC: 8.2 MG/DL — LOW (ref 8.4–10.5)
CALCIUM SERPL-MCNC: 8.4 MG/DL — SIGNIFICANT CHANGE UP (ref 8.4–10.5)
CHLORIDE SERPL-SCNC: 101 MMOL/L — SIGNIFICANT CHANGE UP (ref 96–108)
CHLORIDE SERPL-SCNC: 99 MMOL/L — SIGNIFICANT CHANGE UP (ref 96–108)
CO2 SERPL-SCNC: 27 MMOL/L — SIGNIFICANT CHANGE UP (ref 22–31)
CO2 SERPL-SCNC: 30 MMOL/L — SIGNIFICANT CHANGE UP (ref 22–31)
CREAT SERPL-MCNC: 1.95 MG/DL — HIGH (ref 0.5–1.3)
CREAT SERPL-MCNC: 2.13 MG/DL — HIGH (ref 0.5–1.3)
CULTURE RESULTS: SIGNIFICANT CHANGE UP
EOSINOPHIL # BLD AUTO: 0.02 K/UL — SIGNIFICANT CHANGE UP (ref 0–0.5)
EOSINOPHIL NFR BLD AUTO: 0.2 % — SIGNIFICANT CHANGE UP (ref 0–6)
GLUCOSE SERPL-MCNC: 101 MG/DL — HIGH (ref 70–99)
GLUCOSE SERPL-MCNC: 119 MG/DL — HIGH (ref 70–99)
HCT VFR BLD CALC: 34.3 % — LOW (ref 34.5–45)
HGB BLD-MCNC: 11.3 G/DL — LOW (ref 11.5–15.5)
IMM GRANULOCYTES NFR BLD AUTO: 0.8 % — SIGNIFICANT CHANGE UP (ref 0–1.5)
INR BLD: 2.31 RATIO — HIGH (ref 0.88–1.16)
LACTATE SERPL-SCNC: 1.3 MMOL/L — SIGNIFICANT CHANGE UP (ref 0.7–2)
LYMPHOCYTES # BLD AUTO: 0.46 K/UL — LOW (ref 1–3.3)
LYMPHOCYTES # BLD AUTO: 4.9 % — LOW (ref 13–44)
MCHC RBC-ENTMCNC: 30.5 PG — SIGNIFICANT CHANGE UP (ref 27–34)
MCHC RBC-ENTMCNC: 32.9 GM/DL — SIGNIFICANT CHANGE UP (ref 32–36)
MCV RBC AUTO: 92.5 FL — SIGNIFICANT CHANGE UP (ref 80–100)
MONOCYTES # BLD AUTO: 0.65 K/UL — SIGNIFICANT CHANGE UP (ref 0–0.9)
MONOCYTES NFR BLD AUTO: 7 % — SIGNIFICANT CHANGE UP (ref 2–14)
NEUTROPHILS # BLD AUTO: 8.08 K/UL — HIGH (ref 1.8–7.4)
NEUTROPHILS NFR BLD AUTO: 86.8 % — HIGH (ref 43–77)
NRBC # BLD: 0 /100 WBCS — SIGNIFICANT CHANGE UP (ref 0–0)
PLATELET # BLD AUTO: 187 K/UL — SIGNIFICANT CHANGE UP (ref 150–400)
POTASSIUM SERPL-MCNC: 2.1 MMOL/L — CRITICAL LOW (ref 3.5–5.3)
POTASSIUM SERPL-MCNC: 2.4 MMOL/L — CRITICAL LOW (ref 3.5–5.3)
POTASSIUM SERPL-MCNC: 3 MMOL/L — LOW (ref 3.5–5.3)
POTASSIUM SERPL-SCNC: 2.1 MMOL/L — CRITICAL LOW (ref 3.5–5.3)
POTASSIUM SERPL-SCNC: 2.4 MMOL/L — CRITICAL LOW (ref 3.5–5.3)
POTASSIUM SERPL-SCNC: 3 MMOL/L — LOW (ref 3.5–5.3)
PROTHROM AB SERPL-ACNC: 26.5 SEC — HIGH (ref 10–12.9)
RBC # BLD: 3.71 M/UL — LOW (ref 3.8–5.2)
RBC # FLD: 21.2 % — HIGH (ref 10.3–14.5)
SODIUM SERPL-SCNC: 136 MMOL/L — SIGNIFICANT CHANGE UP (ref 135–145)
SODIUM SERPL-SCNC: 139 MMOL/L — SIGNIFICANT CHANGE UP (ref 135–145)
SPECIMEN SOURCE: SIGNIFICANT CHANGE UP
WBC # BLD: 9.31 K/UL — SIGNIFICANT CHANGE UP (ref 3.8–10.5)
WBC # FLD AUTO: 9.31 K/UL — SIGNIFICANT CHANGE UP (ref 3.8–10.5)

## 2019-06-05 PROCEDURE — 99235 HOSP IP/OBS SAME DATE MOD 70: CPT

## 2019-06-05 PROCEDURE — 93010 ELECTROCARDIOGRAM REPORT: CPT

## 2019-06-05 PROCEDURE — 99223 1ST HOSP IP/OBS HIGH 75: CPT

## 2019-06-05 RX ORDER — POTASSIUM CHLORIDE 20 MEQ
40 PACKET (EA) ORAL DAILY
Refills: 0 | Status: DISCONTINUED | OUTPATIENT
Start: 2019-06-05 | End: 2019-06-07

## 2019-06-05 RX ORDER — POTASSIUM CHLORIDE 20 MEQ
10 PACKET (EA) ORAL
Refills: 0 | Status: COMPLETED | OUTPATIENT
Start: 2019-06-05 | End: 2019-06-05

## 2019-06-05 RX ORDER — METOPROLOL TARTRATE 50 MG
100 TABLET ORAL DAILY
Refills: 0 | Status: DISCONTINUED | OUTPATIENT
Start: 2019-06-05 | End: 2019-06-06

## 2019-06-05 RX ORDER — VANCOMYCIN HCL 1 G
500 VIAL (EA) INTRAVENOUS EVERY 24 HOURS
Refills: 0 | Status: DISCONTINUED | OUTPATIENT
Start: 2019-06-06 | End: 2019-06-09

## 2019-06-05 RX ORDER — WARFARIN SODIUM 2.5 MG/1
5 TABLET ORAL ONCE
Refills: 0 | Status: COMPLETED | OUTPATIENT
Start: 2019-06-05 | End: 2019-06-05

## 2019-06-05 RX ORDER — POTASSIUM CHLORIDE 20 MEQ
40 PACKET (EA) ORAL ONCE
Refills: 0 | Status: DISCONTINUED | OUTPATIENT
Start: 2019-06-05 | End: 2019-06-05

## 2019-06-05 RX ADMIN — WARFARIN SODIUM 5 MILLIGRAM(S): 2.5 TABLET ORAL at 22:40

## 2019-06-05 RX ADMIN — SODIUM CHLORIDE 150 MILLILITER(S): 9 INJECTION INTRAMUSCULAR; INTRAVENOUS; SUBCUTANEOUS at 05:58

## 2019-06-05 RX ADMIN — ESCITALOPRAM OXALATE 5 MILLIGRAM(S): 10 TABLET, FILM COATED ORAL at 12:22

## 2019-06-05 RX ADMIN — Medication 100 MILLIEQUIVALENT(S): at 18:53

## 2019-06-05 RX ADMIN — Medication 100 MILLIEQUIVALENT(S): at 17:25

## 2019-06-05 RX ADMIN — Medication 100 MILLIEQUIVALENT(S): at 15:30

## 2019-06-05 RX ADMIN — Medication 10 MILLIEQUIVALENT(S): at 12:22

## 2019-06-05 RX ADMIN — Medication 250 MILLIGRAM(S): at 12:23

## 2019-06-05 RX ADMIN — MIRTAZAPINE 15 MILLIGRAM(S): 45 TABLET, ORALLY DISINTEGRATING ORAL at 22:40

## 2019-06-05 RX ADMIN — SODIUM CHLORIDE 150 MILLILITER(S): 9 INJECTION INTRAMUSCULAR; INTRAVENOUS; SUBCUTANEOUS at 12:22

## 2019-06-05 RX ADMIN — ATORVASTATIN CALCIUM 80 MILLIGRAM(S): 80 TABLET, FILM COATED ORAL at 22:40

## 2019-06-05 RX ADMIN — PANTOPRAZOLE SODIUM 40 MILLIGRAM(S): 20 TABLET, DELAYED RELEASE ORAL at 05:58

## 2019-06-05 RX ADMIN — Medication 81 MILLIGRAM(S): at 12:22

## 2019-06-05 NOTE — CONSULT NOTE ADULT - SUBJECTIVE AND OBJECTIVE BOX
[facility] Consultation    CHIEF COMPLAINT: Patient is a 89y old  Female who presents with a chief complaint of left lower extremity cellulitis (05 Jun 2019 12:57)  patient seen and examined  chart reviewed    HPI:  89W PMH atrial fibrillation, chronic diastolic heart failue, GERD, s/p mechanical valve replacement, MGUS, anxiety presents for left lower extremity cellulitis.   Patient states it has been an ongoing issue without resolution outpatient.      c/o chills, subjective fever, no pain leg. 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  H/O aortic valve replacement: 2000-mechanical valve-on Coumadin      SOCIAL HISTORY: non smoker     FAMILY HISTORY  No significant family history    Home Medications:  aspirin 81 mg oral tablet: 1 tab(s) orally once a day (29 Oct 2018 06:51)  Coumadin 5 mg oral tablet: 1 tab(s) orally once a day (at bedtime) (29 Oct 2018 06:51)  Crestor 20 mg oral tablet: 1 tab(s) orally once a day (at bedtime) (29 Oct 2018 06:51)  escitalopram 5 mg oral tablet: 1 tab(s) orally once a day (04 Jun 2019 15:02)  furosemide 40 mg oral tablet: 2 tab(s) orally once a day (29 Oct 2018 06:51)  metoprolol succinate 100 mg oral tablet, extended release: 1 tab(s) orally once a day (29 Oct 2018 06:51)  pantoprazole 40 mg oral delayed release tablet: 1 tab(s) orally once a day (29 Oct 2018 06:51)  potassium chloride 10 mEq oral capsule, extended release: 2 cap(s) orally once a day (29 Oct 2018 06:51)  Remeron 15 mg oral tablet: 1 tab(s) orally once a day (at bedtime) (04 Jun 2019 15:02)    MEDICATIONS  (STANDING):  aspirin enteric coated 81 milliGRAM(s) Oral daily  atorvastatin 80 milliGRAM(s) Oral at bedtime  escitalopram 5 milliGRAM(s) Oral daily  metoprolol succinate  milliGRAM(s) Oral daily  mirtazapine 15 milliGRAM(s) Oral at bedtime  pantoprazole    Tablet 40 milliGRAM(s) Oral before breakfast  potassium chloride    Tablet ER 10 milliEquivalent(s) Oral daily  potassium chloride  10 mEq/100 mL IVPB 10 milliEquivalent(s) IV Intermittent every 1 hour  sodium chloride 0.9%. 1000 milliLiter(s) (150 mL/Hr) IV Continuous <Continuous>  vancomycin  IVPB 1000 milliGRAM(s) IV Intermittent daily  warfarin 5 milliGRAM(s) Oral once    MEDICATIONS  (PRN):      Allergies  chlorhexidine containing compounds (Unknown)  Originally Entered as [Unknown] reaction to [WIPES] (Unknown)  penicillin (Faint)  AMADO Chlorhexidine wipes (Hives; Rash)      REVIEW OF SYSTEMS:    CONSTITUTIONAL: weakness  EYES: No visual changes, No diplopia  ENMT: No throat pain , No exudate  NECK: No pain or stiffness  RESPIRATORY: No cough, wheezing, hemoptysis; No shortness of breath  CARDIOVASCULAR: No chest pain or chest pressure.  No shortness of breath or dyspnea on exertion.  No palpitations, dizziness, light headedness, syncope or near syncope.  GASTROINTESTINAL: No abdominal pain. No nausea, vomiting, or hematemesis; No diarrhea or constipation. No melena or hematochezia.  GENITOURINARY: No dysuria, frequency or hematuria  NEUROLOGICAL: No numbness or weakness  SKIN: No itching or rash  All other review of systems is negative unless indicated above    VITAL SIGNS:   Vital Signs Last 24 Hrs  T(C): 36.6 (05 Jun 2019 05:48), Max: 36.6 (05 Jun 2019 05:48)  T(F): 97.8 (05 Jun 2019 05:48), Max: 97.8 (05 Jun 2019 05:48)  HR: 69 (05 Jun 2019 05:48) (64 - 69)  BP: 98/53 (05 Jun 2019 05:48) (98/53 - 112/49)  BP(mean): --  RR: 15 (05 Jun 2019 05:48) (15 - 20)  SpO2: 98% (05 Jun 2019 05:48) (97% - 100%)    I&O's Summary    05 Jun 2019 07:01  -  05 Jun 2019 16:05  --------------------------------------------------------  IN: 360 mL / OUT: 0 mL / NET: 360 mL        PHYSICAL EXAM:    Constitutional:  elderly, ill-appearing woman   Eyes:  EOMI,  Pupils round, no lesions  ENMT: no exudate or erythema  Pulmonary: decreased breath sounds bilateral   Cardiovascular: irregular S1 and S2.  ll/Vl systolic murmur   Gastrointestinal: Bowel Sounds present, soft, nontender.   Lymph: No peripheral edema. No cervical lymphadenopathy.  Neurological: Alert, no focal deficits  Skin: No rashes. Changes of chronic venous stasis. No cyanosis.  Psych:  Mood & affect appropriate    LABS: All Labs Reviewed:                        11.3   9.31  )-----------( 187      ( 05 Jun 2019 06:55 )             34.3                         13.1   10.51 )-----------( 196      ( 04 Jun 2019 13:15 )             39.6     05 Jun 2019 06:55    139    |  101    |  48     ----------------------------<  101    2.1     |  30     |  2.13   04 Jun 2019 13:15    136    |  94     |  60     ----------------------------<  102    3.0     |  32     |  2.66     Ca    8.2        05 Jun 2019 06:55  Ca    9.3        04 Jun 2019 13:15    TPro  7.3    /  Alb  2.4    /  TBili  1.6    /  DBili  x      /  AST  34     /  ALT  33     /  AlkPhos  183    04 Jun 2019 13:15    PT/INR - ( 05 Jun 2019 07:15 )   PT: 26.5 sec;   INR: 2.31 ratio         PTT - ( 04 Jun 2019 13:15 )  PTT:35.1 sec    RADIOLOGY:  < from: Xray Chest 1 View AP/PA (06.04.19 @ 13:42) >  INTERPRETATION:  AP chest on June 4, 2019 at 1:11 PM. Patient has sepsis   and leg cellulitis.    Heart is magnified by technique. Prosthetic heart valves again noted.    Mild to moderate right base effusion is similar to June 5, 2018.    Left effusion on the earlier study is only minimally present at this time.    IMPRESSION: As above.    < end of copied text >    EKG:  < from: 12 Lead ECG (06.04.19 @ 13:19) >  Diagnosis Line Atrial fibrillation  Right axis deviation  Incomplete right bundle branch block  Right ventricular hypertrophy with repolarization abnormality  Nonspecific ST changes.  Abnormal ECG  When compared with ECG of 15-AUG-2018 16:04,  there does not appear to be a significant interval change     < end of copied text >

## 2019-06-05 NOTE — DIETITIAN INITIAL EVALUATION ADULT. - OTHER INFO
Pt.'s aide gives hx. Pt. [place]itimacha.  Weight loss over the past 2 yrs. approx. 15-20 lbs.  Pt's aide states she eats a lot less over the yrs.   Tolerates low fibe DASH diet. No skin breakdown.  Denies N/V/ diarrhea. Last BM WAS THIS AM. B/L LEG edema noted.  Takes ensure PTA.   No dysphagia. Moderate malnutrition noted due to weight loss, suboptimal po intake.

## 2019-06-05 NOTE — PROGRESS NOTE ADULT - SUBJECTIVE AND OBJECTIVE BOX
Patient is a 89y old  Female who presents with a chief complaint of left lower extremity cellulitis (05 Jun 2019 17:05)      INTERVAL HPI/OVERNIGHT EVENTS:      REVIEW OF SYSTEMS:  CONSTITUTIONAL: No fever, weight loss, or fatigue  EYES: No eye pain, visual disturbances, or discharge  ENMT:  No difficulty hearing, tinnitus, vertigo; No sinus or throat pain  NECK: No pain or stiffness  BREASTS: No pain, masses, or nipple discharge  RESPIRATORY: No cough, wheezing, chills or hemoptysis; No shortness of breath  CARDIOVASCULAR: No chest pain, palpitations, dizziness, or leg swelling  GASTROINTESTINAL: No abdominal or epigastric pain. No nausea, vomiting, or hematemesis; No diarrhea or constipation. No melena or hematochezia.  GENITOURINARY: No dysuria, frequency, hematuria, or incontinence  NEUROLOGICAL: No headaches, memory loss, loss of strength, numbness, or tremors  SKIN: No itching, burning, rashes, or lesions   LYMPH NODES: No enlarged glands  ENDOCRINE: No heat or cold intolerance; No hair loss  MUSCULOSKELETAL: No joint pain or swelling; No muscle, back, or extremity pain  PSYCHIATRIC: No depression, anxiety, mood swings, or difficulty sleeping  HEME/LYMPH: No easy bruising, or bleeding gums  ALLERY AND IMMUNOLOGIC: No hives or eczema  FAMILY HISTORY:  No significant family history    T(C): 36.3 (06-05-19 @ 17:30), Max: 36.6 (06-05-19 @ 05:48)  HR: 56 (06-05-19 @ 17:30) (56 - 69)  BP: 99/52 (06-05-19 @ 17:30) (94/52 - 99/52)  RR: 18 (06-05-19 @ 17:30) (15 - 20)  SpO2: 100% (06-05-19 @ 17:30) (98% - 100%)  Wt(kg): --Vital Signs Last 24 Hrs  T(C): 36.3 (05 Jun 2019 17:30), Max: 36.6 (05 Jun 2019 05:48)  T(F): 97.4 (05 Jun 2019 17:30), Max: 97.8 (05 Jun 2019 05:48)  HR: 56 (05 Jun 2019 17:30) (56 - 69)  BP: 99/52 (05 Jun 2019 17:30) (94/52 - 99/52)  BP(mean): --  RR: 18 (05 Jun 2019 17:30) (15 - 20)  SpO2: 100% (05 Jun 2019 17:30) (98% - 100%)    T(F): 97.4 (06-05-19 @ 17:30), Max: 97.8 (06-05-19 @ 05:48)  HR: 56 (06-05-19 @ 17:30) (56 - 119)  BP: 99/52 (06-05-19 @ 17:30) (85/57 - 112/49)  RR: 18 (06-05-19 @ 17:30) (15 - 22)  SpO2: 100% (06-05-19 @ 17:30) (82% - 100%)    PHYSICAL EXAM:  GENERAL: NAD, well-groomed, well-developed  HEAD:  Atraumatic, Normocephalic  EYES: EOMI, PERRLA, conjunctiva and sclera clear  ENMT: No tonsillar erythema, exudates, or enlargement; Moist mucous membranes, Good dentition, No lesions  NECK: Supple, No JVD, Normal thyroid  NERVOUS SYSTEM:  Alert & Oriented X3, Good concentration; Motor Strength 5/5 B/L upper and lower extremities; DTRs 2+ intact and symmetric  CHEST/LUNG: Clear to percussion bilaterally; No rales, rhonchi, wheezing, or rubs  HEART: Regular rate and rhythm; No murmurs, rubs, or gallops  ABDOMEN: Soft, Nontender, Nondistended; Bowel sounds present  EXTREMITIES:  2+ Peripheral Pulses, No clubbing, cyanosis, or edema  LYMPH: No lymphadenopathy noted  SKIN: No rashes or lesions    Consultant(s) Notes Reviewed:  [x ] YES  [ ] NO  Care Discussed with Consultants/Other Providers [ x] YES  [ ] NO    LABS:  06-05    x   |  x   |  x   ----------------------------<  x   3.0<L>   |  x   |  x     Ca    8.2<L>      05 Jun 2019 06:55    TPro  7.3  /  Alb  2.4<L>  /  TBili  1.6<H>  /  DBili  x   /  AST  34  /  ALT  33  /  AlkPhos  183<H>  06-04                          11.3   9.31  )-----------( 187      ( 05 Jun 2019 06:55 )             34.3       Culture - Urine (collected 06-04-19 @ 17:19)  Source: .Urine Clean Catch (Midstream)  Final Report (06-05-19 @ 16:00):    <10,000 CFU/mL Normal Urogenital Anuradha    Culture - Blood (collected 06-04-19 @ 13:15)  Source: .Blood Blood-Peripheral  Preliminary Report (06-05-19 @ 19:00):    No growth to date.    Culture - Blood (collected 06-04-19 @ 13:10)  Source: .Blood Blood-Peripheral  Preliminary Report (06-05-19 @ 19:00):    No growth to date.        PT/INR - ( 05 Jun 2019 07:15 )   PT: 26.5 sec;   INR: 2.31 ratio         PTT - ( 04 Jun 2019 13:15 )  PTT:35.1 sec  LIVER FUNCTIONS - ( 04 Jun 2019 13:15 )  Alb: 2.4 g/dL / Pro: 7.3 g/dL / ALK PHOS: 183 U/L / ALT: 33 U/L DA / AST: 34 U/L / GGT: x                            11.3   9.31  )-----------( 187      ( 06-05 @ 06:55 )             34.3                13.1   10.51 )-----------( 196      ( 06-04 @ 13:15 )             39.6               RADIOLOGY & ADDITIONAL TESTS:    Imaging Personally Reviewed:  [ ] YES  [ ] NO  aspirin enteric coated 81 milliGRAM(s) Oral daily  atorvastatin 80 milliGRAM(s) Oral at bedtime  escitalopram 5 milliGRAM(s) Oral daily  metoprolol succinate  milliGRAM(s) Oral daily  mirtazapine 15 milliGRAM(s) Oral at bedtime  pantoprazole    Tablet 40 milliGRAM(s) Oral before breakfast  potassium chloride    Tablet ER 10 milliEquivalent(s) Oral daily  sodium chloride 0.9%. 1000 milliLiter(s) IV Continuous <Continuous>  warfarin 5 milliGRAM(s) Oral once      HEALTH ISSUES - PROBLEM Dx:  Hypokalemia: Hypokalemia  Essential hypertension: Essential hypertension  Anxiety: Anxiety  Atrial fibrillation, unspecified type: Atrial fibrillation, unspecified type  Aortic valve disease: Aortic valve disease  Skin ulcer, unspecified ulcer stage: Skin ulcer, unspecified ulcer stage  HENRY (acute kidney injury): HENRY (acute kidney injury)  Cellulitis of left leg: Cellulitis of left leg

## 2019-06-05 NOTE — CHART NOTE - NSCHARTNOTEFT_GEN_A_CORE
Reviewed prior progress notes, labs and imaging.    Discussed with Dr. Russo    Primary Diagnosis: Left LE celullitis       Plan: cont. IV Vanco  pt. with mechanical valve, concern for endocarditis  Cont. IV Vanco      Anticipated Discharge: 72 hrs -

## 2019-06-05 NOTE — CONSULT NOTE ADULT - SUBJECTIVE AND OBJECTIVE BOX
NEPHROLOGY CONSULTATION    CHIEF COMPLAINT: LLE cellulitis    HPI:  Pt is 89 yo W w/PMH atrial fibrillation, chronic diastolic heart failue, GERD, s/p mechanical valve replacement, MGUS, anxiety presents for left lower extremity cellulitis. Patient states it has been an ongoing issue without resolution outpatient. C/o chills, subjective fever. Noted to have HENRY.    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 1000 milliLiter(s) (150 mL/Hr) IV Continuous <Continuous>  vancomycin  IVPB 1000 milliGRAM(s) IV Intermittent daily    Vital Signs Last 24 Hrs  T(C): 36.6 (19 @ 05:48), Max: 36.6 (19 @ 05:48)  T(F): 97.8 (19 @ 05:48), Max: 97.8 (19 @ 05:48)  HR: 69 (19 @ 05:48) (56 - 69)  BP: 98/53 (19 @ 05:48) (96/50 - 112/49)  RR: 15 (06-05-19 @ 05:48) (15 - 22)  SpO2: 98% (19 @ 05:48) (96% - 100%)    LABS:                        11.3   9.31  )-----------( 187      ( 2019 06:55 )             34.3         139  |  101  |  48<H>  ----------------------------<  101<H>  2.1<LL>   |  30  |  2.13<H>    Ca    8.2<L>      2019 06:55    TPro  7.3  /  Alb  2.4<L>  /  TBili  1.6<H>  /  DBili  x   /  AST  34  /  ALT  33  /  AlkPhos  183<H>      Urinalysis Basic - ( 2019 17:19 )    Color: Yellow / Appearance: Clear / S.005 / pH: x  Gluc: x / Ketone: Negative  / Bili: Negative / Urobili: Negative   Blood: x / Protein: 100 / Nitrite: Negative   Leuk Esterase: Negative / RBC: 0-4 /HPF / WBC Negative /HPF   Sq Epi: x / Non Sq Epi: Neg.-Few / Bacteria: Trace /HPF    LIVER FUNCTIONS - ( 2019 13:15 )  Alb: 2.4 g/dL / Pro: 7.3 g/dL / ALK PHOS: 183 U/L / ALT: 33 U/L DA / AST: 34 U/L / GGT: x           PT/INR - ( 2019 07:15 )   PT: 26.5 sec;   INR: 2.31 ratio       PTT - ( 2019 13:15 )  PTT:35.1 sec    A/P: NEPHROLOGY CONSULTATION    CHIEF COMPLAINT: LLE cellulitis    HPI:  Pt is 89 yo W w/PMH of atrial fibrillation, chronic diastolic heart failue, GERD, s/p mechanical valve replacement, MGUS, CKD 3 (Cr 1.24 - 10/29/18) anxiety presents for left lower extremity cellulitis. Noted to have HENRY on CKD 3. Denies CP, SOB, N/V/D/C/F/C. Was on Lasix as op.    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-  H/O aortic valve replacement: -mechanical valve-on Coumadin    SOCIAL HISTORY:  negative    FAMILY HISTORY:  No significant family history    aspirin enteric coated 81 milliGRAM(s) Oral daily  atorvastatin 80 milliGRAM(s) Oral at bedtime  escitalopram 5 milliGRAM(s) Oral daily  metoprolol succinate  milliGRAM(s) Oral daily  mirtazapine 15 milliGRAM(s) Oral at bedtime  pantoprazole    Tablet 40 milliGRAM(s) Oral before breakfast  potassium chloride    Tablet ER 10 milliEquivalent(s) Oral daily  warfarin 5 milliGRAM(s) Oral once    Home Medications:  aspirin 81 mg oral tablet: 1 tab(s) orally once a day (29 Oct 2018 06:51)  Coumadin 5 mg oral tablet: 1 tab(s) orally once a day (at bedtime) (29 Oct 2018 06:51)  Crestor 20 mg oral tablet: 1 tab(s) orally once a day (at bedtime) (29 Oct 2018 06:51)  escitalopram 5 mg oral tablet: 1 tab(s) orally once a day (2019 15:02)  furosemide 40 mg oral tablet: 2 tab(s) orally once a day (29 Oct 2018 06:51)  metoprolol succinate 100 mg oral tablet, extended release: 1 tab(s) orally once a day (29 Oct 2018 06:51)  pantoprazole 40 mg oral delayed release tablet: 1 tab(s) orally once a day (29 Oct 2018 06:51)  potassium chloride 10 mEq oral capsule, extended release: 2 cap(s) orally once a day (29 Oct 2018 06:51)  Remeron 15 mg oral tablet: 1 tab(s) orally once a day (at bedtime) (2019 15:02)    Vital Signs Last 24 Hrs  T(C): 36.6 (19 @ 05:48), Max: 36.6 (19 @ 05:48)  T(F): 97.8 (19 @ 05:48), Max: 97.8 (19 @ 05:48)  HR: 69 (19 @ 05:48) (56 - 69)  BP: 98/53 (19 @ 05:48) (96/50 - 112/49)  RR: 15 (19 @ 05:48) (15 - 22)  SpO2: 98% (19 @ 05:48) (96% - 100%)    Card S1S2  Lungs fair air entry b/l  Abd soft, NT, ND  Extr + edema, erythema LLE                        11.3   9.31  )-----------( 187      ( 2019 06:55 )             34.3     2019 16:15    x      |  x      |  x      ----------------------------<  x      3.0     |  x      |  x        Ca    8.2        2019 06:55    TPro  7.3    /  Alb  2.4    /  TBili  1.6    /  DBili  x      /  AST  34     /  ALT  33     /  AlkPhos  183    2019 13:15    LIVER FUNCTIONS - ( 2019 13:15 )  Alb: 2.4 g/dL / Pro: 7.3 g/dL / ALK PHOS: 183 U/L / ALT: 33 U/L DA / AST: 34 U/L / GGT: x           PT/INR - ( 2019 07:15 )   PT: 26.5 sec;   INR: 2.31 ratio       PTT - ( 2019 13:15 )  PTT:35.1 sec  CAPILLARY BLOOD GLUCOSE    Urinalysis Basic - ( 2019 17:19 )    Color: Yellow / Appearance: Clear / S.005 / pH: x  Gluc: x / Ketone: Negative  / Bili: Negative / Urobili: Negative   Blood: x / Protein: 100 / Nitrite: Negative   Leuk Esterase: Negative / RBC: 0-4 /HPF / WBC Negative /HPF   Sq Epi: x / Non Sq Epi: Neg.-Few / Bacteria: Trace /HPF    A/P:    Suspect hemodynamic HENRY on CKD 3  Hold Lasix  No nephrotoxins  Will check renal SONO  CBC, BMP in am  No need for IVF  Avoid hypotension   Will follow

## 2019-06-05 NOTE — CHART NOTE - NSCHARTNOTEFT_GEN_A_CORE
Upon Nutritional Assessment by the Registered Dietitian your patient was determined to meet criteria / has evidence of the following diagnosis/diagnoses:          [ ]  Mild Protein Calorie Malnutrition        [X ]  Moderate Protein Calorie Malnutrition        [ ] Severe Protein Calorie Malnutrition        [ ] Unspecified Protein Calorie Malnutrition        [ ] Underweight / BMI <19        [ ] Morbid Obesity / BMI > 40      Findings as based on:  [X ] Comprehensive nutrition assessment   [ X] Nutrition Focused Physical Exam  [X ] Other: 20 LB. WEIGHT LOSS OVER 2 YRS., suboptimal po intake, muscle wasting, loss body fat      Nutrition Plan/Recommendations:  Suggest ensure enlive QD.        PROVIDER Section:     By signing this assessment you are acknowledging and agree with the diagnosis/diagnoses assigned by the Registered Dietitian    Comments:

## 2019-06-05 NOTE — PROGRESS NOTE ADULT - ASSESSMENT
Admitted from the office with increased swelling of the left foot associated with trauma with associated cellulitis noted to be hypokalemic supplemented repeat 3.0 continue to supplement hold diuretics digoxin transferred to telemetry for observation discussed with cardiology

## 2019-06-05 NOTE — CHART NOTE - NSCHARTNOTEFT_GEN_A_CORE
Upon Nutritional Assessment by the Registered Dietitian your patient was determined to meet criteria / has evidence of the following diagnosis/diagnoses:          [ ]  Mild Protein Calorie Malnutrition        [X ]  Moderate Protein Calorie Malnutrition        [ ] Severe Protein Calorie Malnutrition        [ ] Unspecified Protein Calorie Malnutrition        [ ] Underweight / BMI <19        [ ] Morbid Obesity / BMI > 40      Findings as based on:  [X ] Comprehensive nutrition assessment   [X ] Nutrition Focused Physical Exam  [X ] Other: WEIGHT LOSS 20 lbs. in last 2 yrs., suboptimal po intake, muscle wasting, loss body fat      Nutrition Plan/Recommendations:      Suggest 1 can ensure enlive daily.    PROVIDER Section:     By signing this assessment you are acknowledging and agree with the diagnosis/diagnoses assigned by the Registered Dietitian    Comments:

## 2019-06-05 NOTE — CONSULT NOTE ADULT - ASSESSMENT
89 year old woman admitted for cellulitis of lower extremity.   Called to see patient regarding hypokalemia and potential for arrhythmia.  Medical problems include MGUS and anxiety   She has cardiac history of AF, on anticoagulation, s/p remote mechanical AVR and MitraClip     Plan  - transfer to telemetry to observe for bradycardia   - replete K, as is being done   - hold digoxin  - hold diuresis for now, appears to be dehydrated   - continue metoprolol  - continue anticoagulation   - antibiotics as per Medicine and ID    discussed with patient and with primary MD

## 2019-06-05 NOTE — CHART NOTE - NSCHARTNOTEFT_GEN_A_CORE
Pt is 89 yo W w/PMH atrial fibrillation, chronic diastolic heart failue, GERD, s/p mechanical valve replacement, MGUS, anxiety presents for left lower extremity cellulitis.   Pt. with severe hypokalemia, critical value K=2.1, KCL  10 mEq IV x 3 ordered stat, will recheck K at 7PM after K repleted. EKG stat unchanged from previous, showing Afib  Cardiology consult - Dr. Marshall - appreciated: d/c digoxin, cardiac monitor, Tx to tele.   Plan discussed and agreed with Dr. Russo

## 2019-06-06 LAB
-  STREPTOCOCCUS SP. (NOT GRP A, B OR S PNEUMONIAE): SIGNIFICANT CHANGE UP
ANION GAP SERPL CALC-SCNC: 9 MMOL/L — SIGNIFICANT CHANGE UP (ref 5–17)
BUN SERPL-MCNC: 43 MG/DL — HIGH (ref 7–23)
CALCIUM SERPL-MCNC: 8.9 MG/DL — SIGNIFICANT CHANGE UP (ref 8.4–10.5)
CHLORIDE SERPL-SCNC: 99 MMOL/L — SIGNIFICANT CHANGE UP (ref 96–108)
CO2 SERPL-SCNC: 28 MMOL/L — SIGNIFICANT CHANGE UP (ref 22–31)
CREAT SERPL-MCNC: 1.92 MG/DL — HIGH (ref 0.5–1.3)
DIGOXIN SERPL-MCNC: 0.4 NG/ML — LOW (ref 0.8–2)
GLUCOSE SERPL-MCNC: 109 MG/DL — HIGH (ref 70–99)
GRAM STN FLD: SIGNIFICANT CHANGE UP
HCT VFR BLD CALC: 35.8 % — SIGNIFICANT CHANGE UP (ref 34.5–45)
HGB BLD-MCNC: 11.7 G/DL — SIGNIFICANT CHANGE UP (ref 11.5–15.5)
INR BLD: 2.68 RATIO — HIGH (ref 0.88–1.16)
MAGNESIUM SERPL-MCNC: 1.9 MG/DL — SIGNIFICANT CHANGE UP (ref 1.6–2.6)
MCHC RBC-ENTMCNC: 30 PG — SIGNIFICANT CHANGE UP (ref 27–34)
MCHC RBC-ENTMCNC: 32.7 GM/DL — SIGNIFICANT CHANGE UP (ref 32–36)
MCV RBC AUTO: 91.8 FL — SIGNIFICANT CHANGE UP (ref 80–100)
METHOD TYPE: SIGNIFICANT CHANGE UP
NRBC # BLD: 0 /100 WBCS — SIGNIFICANT CHANGE UP (ref 0–0)
PHOSPHATE SERPL-MCNC: 3.3 MG/DL — SIGNIFICANT CHANGE UP (ref 2.5–4.5)
PLATELET # BLD AUTO: 242 K/UL — SIGNIFICANT CHANGE UP (ref 150–400)
POTASSIUM SERPL-MCNC: 3.3 MMOL/L — LOW (ref 3.5–5.3)
POTASSIUM SERPL-SCNC: 3.3 MMOL/L — LOW (ref 3.5–5.3)
PROT SERPL-MCNC: 5.7 G/DL — LOW (ref 6–8.3)
PROT SERPL-MCNC: 5.7 G/DL — LOW (ref 6–8.3)
PROTHROM AB SERPL-ACNC: 30.9 SEC — HIGH (ref 10–12.9)
RBC # BLD: 3.9 M/UL — SIGNIFICANT CHANGE UP (ref 3.8–5.2)
RBC # FLD: 21.6 % — HIGH (ref 10.3–14.5)
SODIUM SERPL-SCNC: 136 MMOL/L — SIGNIFICANT CHANGE UP (ref 135–145)
URATE SERPL-MCNC: 4.3 MG/DL — SIGNIFICANT CHANGE UP (ref 2.5–7)
WBC # BLD: 9.02 K/UL — SIGNIFICANT CHANGE UP (ref 3.8–10.5)
WBC # FLD AUTO: 9.02 K/UL — SIGNIFICANT CHANGE UP (ref 3.8–10.5)

## 2019-06-06 PROCEDURE — 99232 SBSQ HOSP IP/OBS MODERATE 35: CPT

## 2019-06-06 PROCEDURE — 99233 SBSQ HOSP IP/OBS HIGH 50: CPT

## 2019-06-06 PROCEDURE — 93306 TTE W/DOPPLER COMPLETE: CPT | Mod: 26

## 2019-06-06 PROCEDURE — 76775 US EXAM ABDO BACK WALL LIM: CPT | Mod: 26

## 2019-06-06 PROCEDURE — 93010 ELECTROCARDIOGRAM REPORT: CPT

## 2019-06-06 RX ORDER — WARFARIN SODIUM 2.5 MG/1
4 TABLET ORAL ONCE
Refills: 0 | Status: COMPLETED | OUTPATIENT
Start: 2019-06-06 | End: 2019-06-06

## 2019-06-06 RX ORDER — POTASSIUM CHLORIDE 20 MEQ
40 PACKET (EA) ORAL ONCE
Refills: 0 | Status: COMPLETED | OUTPATIENT
Start: 2019-06-06 | End: 2019-06-06

## 2019-06-06 RX ORDER — METOPROLOL TARTRATE 50 MG
50 TABLET ORAL DAILY
Refills: 0 | Status: DISCONTINUED | OUTPATIENT
Start: 2019-06-06 | End: 2019-06-07

## 2019-06-06 RX ADMIN — Medication 40 MILLIEQUIVALENT(S): at 10:43

## 2019-06-06 RX ADMIN — Medication 40 MILLIEQUIVALENT(S): at 07:11

## 2019-06-06 RX ADMIN — PANTOPRAZOLE SODIUM 40 MILLIGRAM(S): 20 TABLET, DELAYED RELEASE ORAL at 06:07

## 2019-06-06 RX ADMIN — WARFARIN SODIUM 4 MILLIGRAM(S): 2.5 TABLET ORAL at 21:34

## 2019-06-06 RX ADMIN — ESCITALOPRAM OXALATE 5 MILLIGRAM(S): 10 TABLET, FILM COATED ORAL at 10:42

## 2019-06-06 RX ADMIN — Medication 81 MILLIGRAM(S): at 10:43

## 2019-06-06 RX ADMIN — ATORVASTATIN CALCIUM 80 MILLIGRAM(S): 80 TABLET, FILM COATED ORAL at 21:34

## 2019-06-06 RX ADMIN — Medication 100 MILLIGRAM(S): at 07:11

## 2019-06-06 RX ADMIN — MIRTAZAPINE 15 MILLIGRAM(S): 45 TABLET, ORALLY DISINTEGRATING ORAL at 21:34

## 2019-06-06 RX ADMIN — Medication 40 MILLIEQUIVALENT(S): at 00:44

## 2019-06-06 NOTE — PROGRESS NOTE ADULT - ASSESSMENT
Patient with chronic edema of left leg with remote ORIF, AVR, MV clip, CHF on coumadin, got a small scrape to left leg followed by red & swollen left leg x 1 wk without fevers.   Started on vancomycin for leg cellulitis  Now with blood cx 3/4 GPC in pairs & chains     Recommendations:  Continue vancomycin  Follow ID of GCP   Elevate leg   Wound care evaluation   Repeat blood cx x 2

## 2019-06-06 NOTE — PROGRESS NOTE ADULT - ASSESSMENT
Continue aggressive potassium supplementation physical therapy ordered infectious disease note seen and appreciated tenderness swelling and erythema to the left foot and ankle  improved

## 2019-06-06 NOTE — PROGRESS NOTE ADULT - SUBJECTIVE AND OBJECTIVE BOX
CC: f/u for LLE cellulitis     Patient reports that she has no appetite & leg hurts     REVIEW OF SYSTEMS:  All other review of systems negative (Comprehensive ROS)    Antimicrobials Day #  : 2  vancomycin  IVPB 500 milliGRAM(s) IV Intermittent every 24 hours    Other Medications Reviewed    T(F): 97.8 (19 @ 12:48), Max: 97.8 (19 @ 19:17)  HR: 66 (19 @ 12:48)  BP: 108/66 (19 @ 12:48)  RR: 15 (19 @ 12:48)  SpO2: 93% (19 @ 12:48)  Wt(kg): --    PHYSICAL EXAM:  General: alert, no acute distress  Eyes:  anicteric, no conjunctival injection, no discharge  Neck: supple  Lungs: clear to auscultation  Heart: regular rate and rhythm; no murmurs  Abdomen: soft, nondistended, nontender, bowel sounds active   Skin: no lesions  Extremities: bilateral edema L>>>R with erythematous left leg from knee down to the dorsum of foot, TTP   Neurologic: alert, oriented, moves all extremities    LAB RESULTS:                        11.7   9.02  )-----------( 242      ( 2019 05:05 )             35.8     06-06    136  |  99  |  43<H>  ----------------------------<  109<H>  3.3<L>   |  28  |  1.92<H>    Ca    8.9      2019 05:05  Phos  3.3     06-05  Mg     1.9     06-05    TPro  5.7<L>  /  Alb  x   /  TBili  x   /  DBili  x   /  AST  x   /  ALT  x   /  AlkPhos  x   06-06    LIVER FUNCTIONS - ( 2019 05:05 )  Alb: x     / Pro: 5.7 g/dL / ALK PHOS: x     / ALT: x     / AST: x     / GGT: x           Urinalysis Basic - ( 2019 17:19 )    Color: Yellow / Appearance: Clear / S.005 / pH: x  Gluc: x / Ketone: Negative  / Bili: Negative / Urobili: Negative   Blood: x / Protein: 100 / Nitrite: Negative   Leuk Esterase: Negative / RBC: 0-4 /HPF / WBC Negative /HPF   Sq Epi: x / Non Sq Epi: Neg.-Few / Bacteria: Trace /HPF      MICROBIOLOGY:  RECENT CULTURES:   @ 17:19 .Urine Clean Catch (Midstream)     <10,000 CFU/mL Normal Urogenital Anuradha       @ 13:15 .Blood Blood-Peripheral Blood Culture PCR      Growth in aerobic bottle: Gram Positive Cocci in Pairs and Chains    RADIOLOGY REVIEWED:

## 2019-06-06 NOTE — PROGRESS NOTE ADULT - SUBJECTIVE AND OBJECTIVE BOX
Follow up for hypokalemia nd medical management and bacteremia.    SUBJ:    looks and feels well    PMH  Aortic valve disease  Non-rheumatic mitral regurgitation  HLD (hyperlipidemia)  Melanoma  Diverticulosis of small intestine without hemorrhage  Afib  Anxiety  Essential hypertension      MEDICATIONS  (STANDING):  aspirin enteric coated 81 milliGRAM(s) Oral daily  atorvastatin 80 milliGRAM(s) Oral at bedtime  escitalopram 5 milliGRAM(s) Oral daily  metoprolol succinate ER 50 milliGRAM(s) Oral daily  mirtazapine 15 milliGRAM(s) Oral at bedtime  pantoprazole    Tablet 40 milliGRAM(s) Oral before breakfast  potassium chloride    Tablet ER 40 milliEquivalent(s) Oral daily  vancomycin  IVPB 500 milliGRAM(s) IV Intermittent every 24 hours    MEDICATIONS  (PRN):        PHYSICAL EXAM:  Vital Signs Last 24 Hrs  T(C): 36.6 (06 Jun 2019 12:48), Max: 36.6 (05 Jun 2019 19:17)  T(F): 97.8 (06 Jun 2019 12:48), Max: 97.8 (05 Jun 2019 19:17)  HR: 66 (06 Jun 2019 12:48) (56 - 85)  BP: 108/66 (06 Jun 2019 12:48) (94/52 - 111/69)  BP(mean): --  RR: 15 (06 Jun 2019 12:48) (15 - 20)  SpO2: 93% (06 Jun 2019 12:48) (92% - 100%)    GENERAL: NAD, elderly woman appears stated age  HEAD:  Atraumatic, Normocephalic  EYES: EOMI, PERRLA, conjunctiva and sclera clear  ENT: Moist mucous membranes,  NECK: Supple, No JVD, no bruits  CHEST/LUNG: Clear to percussion bilaterally; No rales, rhonchi, wheezing, or rubs  HEART: Regular rate and rhythm; No murmurs, rubs, or gallops PMI non displaced.  ABDOMEN: Soft, Nontender, Nondistended; Bowel sounds present  EXTREMITIES:  2+ Peripheral Pulses, No clubbing, cyanosis, or edema  SKIN: No rashes or lesions  NERVOUS SYSTEM:  Cranial Nerves II-XII intact      TELEMETRY:    afib    ECG:    < from: 12 Lead ECG (06.06.19 @ 06:42) >  Ventricular Rate 73 BPM    Atrial Rate 83 BPM    QRS Duration 108 ms    Q-T Interval 442 ms    QTC Calculation(Bezet) 486 ms    R Axis 120 degrees    T Axis -34 degrees    Diagnosis Line Atrial fibrillation with premature ventricular or aberrantly conducted complexes  Right axis deviation  Incomplete right bundle branch block  Right ventricular hypertrophy with repolarization abnormality  Marked ST abnormality, possible anterior subendocardial injury  Abnormal ECG  When compared with ECG of 05-JUN-2019 15:55,  there is no significant interval change  Please repeat  Confirmed by ESSENCE SIMEON MD ([id number]) on 6/6/2019 8:57:40 AM    < end of copied text >    ECHO:    LABS:                        11.7   9.02  )-----------( 242      ( 06 Jun 2019 05:05 )             35.8     06-06    136  |  99  |  43<H>  ----------------------------<  109<H>  3.3<L>   |  28  |  1.92<H>    Ca    8.9      06 Jun 2019 05:05  Phos  3.3     06-05  Mg     1.9     06-05    TPro  5.7<L>  /  Alb  x   /  TBili  x   /  DBili  x   /  AST  x   /  ALT  x   /  AlkPhos  x   06-06        PT/INR - ( 06 Jun 2019 05:05 )   PT: 30.9 sec;   INR: 2.68 ratio         I&O's Summary    05 Jun 2019 07:01  -  06 Jun 2019 07:00  --------------------------------------------------------  IN: 560 mL / OUT: 0 mL / NET: 560 mL    06 Jun 2019 07:01  -  06 Jun 2019 13:48  --------------------------------------------------------  IN: 100 mL / OUT: 0 mL / NET: 100 mL      BNP    RADIOLOGY & ADDITIONAL STUDIES:    ECHO:

## 2019-06-06 NOTE — PROGRESS NOTE ADULT - SUBJECTIVE AND OBJECTIVE BOX
Awake, weak    Vital Signs Last 24 Hrs  T(C): 36.9 (06-06-19 @ 20:20), Max: 36.9 (06-06-19 @ 20:20)  T(F): 98.4 (06-06-19 @ 20:20), Max: 98.4 (06-06-19 @ 20:20)  HR: 85 (06-06-19 @ 20:20) (64 - 85)  BP: 111/69 (06-06-19 @ 20:20) (95/51 - 111/69)  RR: 17 (06-06-19 @ 20:20) (15 - 17)  SpO2: 96% (06-06-19 @ 20:20) (92% - 96%)    Card S1S2  Lungs fair air entry b/l  Abd soft, NT, ND  Extr + edema, erythema LLE                                11.7   9.02  )-----------( 242      ( 06 Jun 2019 05:05 )             35.8     06 Jun 2019 05:05    136    |  99     |  43     ----------------------------<  109    3.3     |  28     |  1.92     Ca    8.9        06 Jun 2019 05:05  Phos  3.3       05 Jun 2019 23:05  Mg     1.9       05 Jun 2019 23:05    TPro  5.7    /  Alb  x      /  TBili  x      /  DBili  x      /  AST  x      /  ALT  x      /  AlkPhos  x      06 Jun 2019 05:05    LIVER FUNCTIONS - ( 06 Jun 2019 05:05 )  Alb: x     / Pro: 5.7 g/dL / ALK PHOS: x     / ALT: x     / AST: x     / GGT: x           PT/INR - ( 06 Jun 2019 05:05 )   PT: 30.9 sec;   INR: 2.68 ratio      aspirin enteric coated 81 milliGRAM(s) Oral daily  atorvastatin 80 milliGRAM(s) Oral at bedtime  escitalopram 5 milliGRAM(s) Oral daily  metoprolol succinate ER 50 milliGRAM(s) Oral daily  mirtazapine 15 milliGRAM(s) Oral at bedtime  pantoprazole    Tablet 40 milliGRAM(s) Oral before breakfast  potassium chloride    Tablet ER 40 milliEquivalent(s) Oral daily  vancomycin  IVPB 500 milliGRAM(s) IV Intermittent every 24 hours    A/P:    Septic/hemodynamic HENRY on CKD 3  Improving  Hold Lasix  No nephrotoxins  Renal SONO w/distended bladder  Check PVR  CBC, BMP in am  Avoid hypotension   Abx per ID  F/u Vanco level  Dose for CrCl < 25  Will follow

## 2019-06-06 NOTE — PROGRESS NOTE ADULT - SUBJECTIVE AND OBJECTIVE BOX
Patient is a 89y old  Female who presents with a chief complaint of left lower extremity cellulitis (05 Jun 2019 19:17)      INTERVAL HPI/OVERNIGHT EVENTS:No complaints feels well      REVIEW OF SYSTEMS:  CONSTITUTIONAL: No fever, weight loss, or fatigue  EYES: No eye pain, visual disturbances, or discharge  ENMT:  No difficulty hearing, tinnitus, vertigo; No sinus or throat pain  NECK: No pain or stiffness  BREASTS: No pain, masses, or nipple discharge  RESPIRATORY: No cough, wheezing, chills or hemoptysis; No shortness of breath  CARDIOVASCULAR: No chest pain, palpitations, dizziness, or leg swelling  GASTROINTESTINAL: No abdominal or epigastric pain. No nausea, vomiting, or hematemesis; No diarrhea or constipation. No melena or hematochezia.  GENITOURINARY: No dysuria, frequency, hematuria, or incontinence  NEUROLOGICAL: No headaches, memory loss, loss of strength, numbness, or tremors  SKIN: No itching, burning, rashes, or lesions   LYMPH NODES: No enlarged glands  ENDOCRINE: No heat or cold intolerance; No hair loss  MUSCULOSKELETAL: No joint pain or swelling; No muscle, back, or extremity pain  PSYCHIATRIC: No depression, anxiety, mood swings, or difficulty sleeping  HEME/LYMPH: No easy bruising, or bleeding gums  ALLERY AND IMMUNOLOGIC: No hives or eczema  FAMILY HISTORY:  No significant family history    T(C): 36.4 (06-06-19 @ 08:40), Max: 36.6 (06-05-19 @ 19:17)  HR: 85 (06-06-19 @ 08:40) (56 - 85)  BP: 111/69 (06-06-19 @ 08:40) (94/52 - 111/69)  RR: 15 (06-06-19 @ 08:40) (15 - 20)  SpO2: 92% (06-06-19 @ 08:40) (92% - 100%)  Wt(kg): --Vital Signs Last 24 Hrs  T(C): 36.4 (06 Jun 2019 08:40), Max: 36.6 (05 Jun 2019 19:17)  T(F): 97.6 (06 Jun 2019 08:40), Max: 97.8 (05 Jun 2019 19:17)  HR: 85 (06 Jun 2019 08:40) (56 - 85)  BP: 111/69 (06 Jun 2019 08:40) (94/52 - 111/69)  BP(mean): --  RR: 15 (06 Jun 2019 08:40) (15 - 20)  SpO2: 92% (06 Jun 2019 08:40) (92% - 100%)    T(F): 97.6 (06-06-19 @ 08:40), Max: 97.8 (06-05-19 @ 05:48)  HR: 85 (06-06-19 @ 08:40) (56 - 119)  BP: 111/69 (06-06-19 @ 08:40) (85/57 - 112/49)  RR: 15 (06-06-19 @ 08:40) (15 - 22)  SpO2: 92% (06-06-19 @ 08:40) (82% - 100%)    PHYSICAL EXAM:  GENERAL: NAD, well-groomed, well-developed  HEAD:  Atraumatic, Normocephalic  EYES: EOMI, PERRLA, conjunctiva and sclera clear  ENMT: No tonsillar erythema, exudates, or enlargement; Moist mucous membranes, Good dentition, No lesions  NECK: Supple, No JVD, Normal thyroid  NERVOUS SYSTEM:  Alert & Oriented X3, Good concentration; Motor Strength 5/5 B/L upper and lower extremities; DTRs 2+ intact and symmetric  CHEST/LUNG: Clear to percussion bilaterally; No rales, rhonchi, wheezing, or rubs  HEART: Regular rate and rhythm; No murmurs, rubs, or gallops  ABDOMEN: Soft, Nontender, Nondistended; Bowel sounds present  EXTREMITIES:  2+ Peripheral Pulses, No clubbing, cyanosis, or edema  LYMPH: No lymphadenopathy noted  SKIN: No rashes or lesions    Consultant(s) Notes Reviewed:  [x ] YES  [ ] NO  Care Discussed with Consultants/Other Providers [ x] YES  [ ] NO    LABS:  06-06    136  |  99  |  43<H>  ----------------------------<  109<H>  3.3<L>   |  28  |  1.92<H>    Ca    8.9      06 Jun 2019 05:05  Phos  3.3     06-05  Mg     1.9     06-05    TPro  7.3  /  Alb  2.4<L>  /  TBili  1.6<H>  /  DBili  x   /  AST  34  /  ALT  33  /  AlkPhos  183<H>  06-04                          11.7   9.02  )-----------( 242      ( 06 Jun 2019 05:05 )             35.8       Culture - Urine (collected 06-04-19 @ 17:19)  Source: .Urine Clean Catch (Midstream)  Final Report (06-05-19 @ 16:00):    <10,000 CFU/mL Normal Urogenital Anuradha    Culture - Blood (collected 06-04-19 @ 13:15)  Source: .Blood Blood-Peripheral  Gram Stain (06-06-19 @ 05:46):    Growth in aerobic bottle: Gram Positive Cocci in Pairs and Chains  Preliminary Report (06-06-19 @ 05:46):    Growth in aerobic bottle: Gram Positive Cocci in Pairs and Chains    "Due to technical problems, Proteus sp. will Not be reported as part of    the BCID panel until further notice"    ***Blood Panel PCR results on this specimen are available    approximately 3 hours after the Gram stain result.***    Gram stain, PCR, and/or culture results may not always    correspond due to difference in methodologies.    ************************************************************    This PCR assay was performed using [employer].    The following targets are tested for: Enterococcus,    vancomycin resistant enterococci, Listeria monocytogenes,    coagulase negative staphylococci, S. aureus,    methicillin resistant S. aureus, Streptococcus agalactiae    (Group B), S. pneumoniae, S. pyogenes (Group A),    Acinetobacter baumannii, Enterobacter cloacae, E. coli,    Klebsiella oxytoca, K. pneumoniae, Proteus sp.,    Serratia marcescens, Haemophilus influenzae,    Neisseria meningitidis, Pseudomonas aeruginosa, Candida    albicans, C.glabrata, C krusei, C parapsilosis,    C. tropicalis and the KPC resistance gene.  Organism: Blood Culture PCR (06-06-19 @ 07:09)  Organism: Blood Culture PCR (06-06-19 @ 07:09)      -  Streptococcus sp. (Not Grp A, B or S pneumoniae): Detec      Method Type: PCR    Culture - Blood (collected 06-04-19 @ 13:10)  Source: .Blood Blood-Peripheral  Preliminary Report (06-05-19 @ 19:00):    No growth to date.        PT/INR - ( 06 Jun 2019 05:05 )   PT: 30.9 sec;   INR: 2.68 ratio         PTT - ( 04 Jun 2019 13:15 )  PTT:35.1 sec  LIVER FUNCTIONS - ( 04 Jun 2019 13:15 )  Alb: 2.4 g/dL / Pro: 7.3 g/dL / ALK PHOS: 183 U/L / ALT: 33 U/L DA / AST: 34 U/L / GGT: x                            11.7   9.02  )-----------( 242      ( 06-06 @ 05:05 )             35.8                11.3   9.31  )-----------( 187      ( 06-05 @ 06:55 )             34.3                13.1   10.51 )-----------( 196      ( 06-04 @ 13:15 )             39.6               RADIOLOGY & ADDITIONAL TESTS:    Imaging Personally Reviewed:  [ ] YES  [ ] NO  aspirin enteric coated 81 milliGRAM(s) Oral daily  atorvastatin 80 milliGRAM(s) Oral at bedtime  escitalopram 5 milliGRAM(s) Oral daily  metoprolol succinate  milliGRAM(s) Oral daily  mirtazapine 15 milliGRAM(s) Oral at bedtime  pantoprazole    Tablet 40 milliGRAM(s) Oral before breakfast  potassium chloride    Tablet ER 40 milliEquivalent(s) Oral daily  vancomycin  IVPB 500 milliGRAM(s) IV Intermittent every 24 hours      HEALTH ISSUES - PROBLEM Dx:  Hypokalemia: Hypokalemia  Essential hypertension: Essential hypertension  Anxiety: Anxiety  Atrial fibrillation, unspecified type: Atrial fibrillation, unspecified type  Aortic valve disease: Aortic valve disease  Skin ulcer, unspecified ulcer stage: Skin ulcer, unspecified ulcer stage  HENRY (acute kidney injury): HENRY (acute kidney injury)  Cellulitis of left leg: Cellulitis of left leg

## 2019-06-07 DIAGNOSIS — R78.81 BACTEREMIA: ICD-10-CM

## 2019-06-07 LAB
ANION GAP SERPL CALC-SCNC: 10 MMOL/L — SIGNIFICANT CHANGE UP (ref 5–17)
BUN SERPL-MCNC: 39 MG/DL — HIGH (ref 7–23)
CALCIUM SERPL-MCNC: 9.2 MG/DL — SIGNIFICANT CHANGE UP (ref 8.4–10.5)
CHLORIDE SERPL-SCNC: 101 MMOL/L — SIGNIFICANT CHANGE UP (ref 96–108)
CO2 SERPL-SCNC: 26 MMOL/L — SIGNIFICANT CHANGE UP (ref 22–31)
CREAT SERPL-MCNC: 1.71 MG/DL — HIGH (ref 0.5–1.3)
CULTURE RESULTS: SIGNIFICANT CHANGE UP
GLUCOSE SERPL-MCNC: 137 MG/DL — HIGH (ref 70–99)
GRAM STN FLD: SIGNIFICANT CHANGE UP
INR BLD: 3.24 RATIO — HIGH (ref 0.88–1.16)
ORGANISM # SPEC MICROSCOPIC CNT: SIGNIFICANT CHANGE UP
ORGANISM # SPEC MICROSCOPIC CNT: SIGNIFICANT CHANGE UP
POTASSIUM SERPL-MCNC: 3.5 MMOL/L — SIGNIFICANT CHANGE UP (ref 3.5–5.3)
POTASSIUM SERPL-SCNC: 3.5 MMOL/L — SIGNIFICANT CHANGE UP (ref 3.5–5.3)
PROTHROM AB SERPL-ACNC: 37.6 SEC — HIGH (ref 10–12.9)
SODIUM SERPL-SCNC: 137 MMOL/L — SIGNIFICANT CHANGE UP (ref 135–145)
SPECIMEN SOURCE: SIGNIFICANT CHANGE UP

## 2019-06-07 PROCEDURE — 99233 SBSQ HOSP IP/OBS HIGH 50: CPT

## 2019-06-07 RX ORDER — METOPROLOL TARTRATE 50 MG
25 TABLET ORAL DAILY
Refills: 0 | Status: DISCONTINUED | OUTPATIENT
Start: 2019-06-07 | End: 2019-06-07

## 2019-06-07 RX ORDER — WARFARIN SODIUM 2.5 MG/1
2.5 TABLET ORAL DAILY
Refills: 0 | Status: DISCONTINUED | OUTPATIENT
Start: 2019-06-07 | End: 2019-06-09

## 2019-06-07 RX ORDER — METOPROLOL TARTRATE 50 MG
12.5 TABLET ORAL DAILY
Refills: 0 | Status: DISCONTINUED | OUTPATIENT
Start: 2019-06-08 | End: 2019-06-09

## 2019-06-07 RX ADMIN — Medication 81 MILLIGRAM(S): at 12:24

## 2019-06-07 RX ADMIN — Medication 40 MILLIEQUIVALENT(S): at 12:24

## 2019-06-07 RX ADMIN — PANTOPRAZOLE SODIUM 40 MILLIGRAM(S): 20 TABLET, DELAYED RELEASE ORAL at 07:26

## 2019-06-07 RX ADMIN — MIRTAZAPINE 15 MILLIGRAM(S): 45 TABLET, ORALLY DISINTEGRATING ORAL at 21:06

## 2019-06-07 RX ADMIN — ATORVASTATIN CALCIUM 80 MILLIGRAM(S): 80 TABLET, FILM COATED ORAL at 21:06

## 2019-06-07 RX ADMIN — ESCITALOPRAM OXALATE 5 MILLIGRAM(S): 10 TABLET, FILM COATED ORAL at 12:24

## 2019-06-07 RX ADMIN — Medication 100 MILLIGRAM(S): at 07:25

## 2019-06-07 RX ADMIN — WARFARIN SODIUM 2.5 MILLIGRAM(S): 2.5 TABLET ORAL at 21:06

## 2019-06-07 NOTE — PROGRESS NOTE ADULT - SUBJECTIVE AND OBJECTIVE BOX
Follow up for    Bacteremia. Cellulitis. Chronic valvular disease with CHF and PHT. AF. Atrial clots. History of HBP  History of Mechanical AVR and mitral clip.    SUBJ:   Feels ok, no fever chills, dyspnea, palpitations.    PMH  Aortic valve disease  Non-rheumatic mitral regurgitation  HLD (hyperlipidemia)  Melanoma  Diverticulosis of small intestine without hemorrhage  Afib  Anxiety  Essential hypertension      MEDICATIONS  (STANDING):  aspirin enteric coated 81 milliGRAM(s) Oral daily  atorvastatin 80 milliGRAM(s) Oral at bedtime  escitalopram 5 milliGRAM(s) Oral daily  metoprolol succinate ER 50 milliGRAM(s) Oral daily  mirtazapine 15 milliGRAM(s) Oral at bedtime  pantoprazole    Tablet 40 milliGRAM(s) Oral before breakfast  potassium chloride    Tablet ER 40 milliEquivalent(s) Oral daily  vancomycin  IVPB 500 milliGRAM(s) IV Intermittent every 24 hours  warfarin 2.5 milliGRAM(s) Oral daily    MEDICATIONS  (PRN):        PHYSICAL EXAM:  Vital Signs Last 24 Hrs  T(C): 36.3 (2019 05:29), Max: 36.9 (2019 20:20)  T(F): 97.4 (2019 05:29), Max: 98.4 (2019 20:20)  HR: 65 (2019 05:29) (65 - 85)  BP: 102/49 (2019 05:29) (96/59 - 111/69)  BP(mean): --  RR: 16 (2019 05:29) (15 - 17)  SpO2: 94% (2019 05:29) (92% - 96%)    GENERAL: NAD, well-groomed, well-developed  HEAD:  Atraumatic, Normocephalic  EYES: EOMI, PERRLA, conjunctiva and sclera clear  ENT: Moist mucous membranes,  NECK: Supple, No JVD, no bruits  CHEST/LUNG: Clear to percussion and auscultation bilaterally; No rales, rhonchi, wheezing, or rubs  HEART: Irregular, good S2  ABDOMEN: Soft, Nontender, Nondistended; Bowel sounds present  EXTREMITIES:  Bandage over skin lesion, LE  SKIN: No rashes or lesions  NERVOUS SYSTEM:  Alert & Oriented X3      TELEMETRY:  af      LABS:                        11.7   9.02  )-----------( 242      ( 2019 05:05 )             35.8     06-06    136  |  99  |  43<H>  ----------------------------<  109<H>  3.3<L>   |  28  |  1.92<H>    Ca    8.9      2019 05:05  Phos  3.3     06-05  Mg     1.9     06-05    TPro  5.7<L>  /  Alb  x   /  TBili  x   /  DBili  x   /  AST  x   /  ALT  x   /  AlkPhos  x   06-        PT/INR - ( 2019 05:05 )   PT: 30.9 sec;   INR: 2.68 ratio             I&O's Summary    2019 07:01  -  2019 07:00  --------------------------------------------------------  IN: 100 mL / OUT: 0 mL / NET: 100 mL      ECHO:    < from: TTE Echo Complete w/Doppler (19 @ 11:39) >    EXAM:  ECHO TTE WO CON COMP [id number]      PROCEDURE DATE:  2019        INTERPRETATION:  REPORT:    TRANSTHORACIC ECHOCARDIOGRAM REPORT         Patient Name:   MELLY VELASQUEZ Patient Location: [address] Rec #:  [id number] Accession #:      [id number]  Account #:      [id number]              Height:           68.0 in 172.7 cm  YOB: 1926           Weight:           142.0 lb 64.40 kg  Patient Age:    89 years            BSA:              1.77 m²  Patient Gender: F                   BP:               111/69 mmHg       Date of Exam:        2019 11:39:37 AM  Sonographer:         ANGELINA  Referring Physician: FRANCIE    Procedure:     2D Echo/Doppler/Color Doppler Complete.  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         2D AND M-MODE MEASUREMENTS (normal ranges within parentheses):  Left Ventricle:                  Normal   Aorta/Left Atrium:             Normal  IVSd (2D):              1.23 cm (0.7-1.1) Aortic Root (Mmode): 3.27 cm   (2.4-3.7)  LVPWd (2D):             1.02 cm (0.7-1.1) Left Atrium (Mmode): 6.10 cm   (1.9-4.0)  LVIDd (2D):             3.58 cm (3.4-5.7)  LVIDs (2D):     2.39 cm  LV FS (2D):             33.3 %   (>25%)  Relative Wall Thickness  0.57    (<0.42)    LV DIASTOLIC FUNCTION:  MV Peak A: 2.11 m/s Decel Time: 625 msec    SPECTRAL DOPPLER ANALYSIS (where applicable):  Mitral Valve:  MV P1/2 Time: 181.30 msec  MV Area, PHT: 1.21 cm²    Aortic Valve: AoV Max Donato: 2.32 m/s AoV Peak P.6 mmHg AoV Mean P.2 mmHg    AoV Area, Vmax:  AoV Area, VTI:  AoV Area, Vmn:  Ao VTI: 0.454  Tricuspid Valve and PA/RV Systolic Pressure: TR Max Velocity: 5.49 m/s RA   Pressure: 15 mmHg RVSP/PASP: 135.6 mmHg    Shunt:  Systemic VTI:  0.164 m  Systemic Diam: 1.51 cm  Pulmonic VTI:  0.195 m  Pulmonic Diam: 2.70 cm  Qp/Qs:         3.39       PHYSICIAN INTERPRETATION:  Left Ventricle: The left ventricular internal cavity size is normal.   Increased relative wall thickness with normal mass index consistent with   left ventricular concentric remodeling.  Global LV systolic function was normal. Left ventricular ejection   fraction, by visual estimation, is 60%.Spectral Doppler shows   restrictive pattern of left ventricular myocardial filling (Grade III   diastolic dysfunction). The ratio of pulmonic flow to systemic flow   (Qp/Qs ratio) is 3.39. D systolic flattening consisten with severe   pulmnary hypertenisn.       LV Wall Scoring:  All segments are normal.    Right Ventricle: The right ventricular size is severely enlarged. RV   systolic function is mildly reduced.  Left Atrium: Severely enlarged left atrium. Biatrial enlargement suggests   restrictive physiology. Moderate interatrial left to right shunt consider   a PFO a patent formaen ovale.  Right Atrium: Severely enlarged right atrium.  Pericardium: There is no evidence of pericardial effusion.  Mitral Valve: Mild mitral valve regurgitation is seen.  Tricuspid Valve: Structurally normal tricuspid valve, with normal leaflet   excursion. Severe tricuspid regurgitation is visualized. Estimated   pulmonary artery systolic pressure is 135.6 mmHg assuming a right atrial   pressure of 15 mmHg, which is consistent with severe pulmonary   hypertension.  Aortic Valve: The aortic valve is a normally functioning mechanical   prosthetic valve.  Pulmonic Valve: Structurally normal pulmonic valve, with normal leaflet   excursion. Moderate pulmonic valve regurgitation.  Aorta: The aortic root is normal in size and structure. Aortic doppler   consistent with a mature aortic vavle prosthesis.  Pulmonary Artery: The main pulmonary artery is normal in size. Pulmonary   hypertension is present.  Shunts: The ratio of pulmonic flow to systemic flow (Qp/Qs ratio) is 3.39.       Summary:   1. Left ventricular ejection fraction, by visual estimation, is 60%.   2. Normal global left ventricular systolic function.   3. Spectral Doppler shows restrictive pattern of left ventricular   myocardial filling (Grade III diastolic dysfunction).   4. D systolic flattening consistent with severe pulmonary hypertenisn.   5. Severely enlarged right ventricle.   6. Severe tricuspid regurgitation.   7. Aortic valve is normally functioning mechanical prosthetic valve.   8. Mechanical prosthesis in the aortic valve position.   9. Moderate pulmonic valve regurgitation.  10. Estimated pulmonary artery systolic pressure is 135.6 mmHg assuming a   right atrial pressure of 15 mmHg, which is consistent with severe   pulmonary hypertension.  11. Severe pulmonary hypertension is present.  12. Moderate interatrial left to right shunt consider a PFO a patent   formaen ovale.  13. Bernoulli equation may be inaccurate in the setting of severe TR.   elevated qp/qs ratio >2 with right heart enlargement suggests a signicant   intracardiac shunt.  14. Biatrial enlargement suggests restrictive physiology.  15. Aortic doppler consistent with a mature aortic vavle prosthesis.    [id number] Scott Hernandez , Electronically signed on 2019 at 3:13:24   PM         *** Final ***    < end of copied text >

## 2019-06-07 NOTE — PROGRESS NOTE ADULT - ASSESSMENT
89 yo F,  AVR, MV clip, CHF on coumadin, remote ORIF, here with leg edema, more in way of L leg erythema, covered for cellulitis with Vanco  Now known to have 4 of 4 Bld Cxs growing Alpha Strep  Afebrile, normal WBC  Chronic findings on ECHO  Although difficult to totally exclude component of cellulitis, I suspect more likely stasis exacerbation, as Alpha Strep typically not assoc with acute cellulitis  In this setting, more concerned abt PVE    Plan:  Repeat Bld Cxs in AM  Await final id and PCN MIRIAM of Bld isolate  Vanco for now  Given implications of this organism in blood, feel compelled to treat for endocarditis- extended IV Tx  Hope to switch to IV Ceftriaxone via PICC  D/w pt at length  D/w Dr Leon

## 2019-06-07 NOTE — PROVIDER CONTACT NOTE (CRITICAL VALUE NOTIFICATION) - TEST AND RESULT REPORTED:
June 4th blood culture shows +growth in aerobic and anerobic bottle gram +ve cocci in peirs and chains

## 2019-06-07 NOTE — PROGRESS NOTE ADULT - ASSESSMENT
to  discuss with infectious disease positive blood cultures with isolation and sensitivities to better define source ... address diuretics and digoxin with cardiology

## 2019-06-07 NOTE — PROGRESS NOTE ADULT - SUBJECTIVE AND OBJECTIVE BOX
CC: f/u for possible cellulitis, high grade Strep sp bacteremia    Patient reports comfortable, no rest pain, no SOB    REVIEW OF SYSTEMS:  All other review of systems negative (Comprehensive ROS)    Antimicrobials Day # 3   vancomycin  IVPB 500 milliGRAM(s) IV Intermittent every 24 hours    Other Medications Reviewed    T(F): 97.8 (06-07-19 @ 08:48), Max: 98.4 (06-06-19 @ 20:20)  HR: 76 (06-07-19 @ 08:48)  BP: 91/55 (06-07-19 @ 08:48)  RR: 16 (06-07-19 @ 08:48)  SpO2: 94% (06-07-19 @ 08:48)  Wt(kg): --    PHYSICAL EXAM:  General: alert, no acute distress  Eyes:  anicteric, no conjunctival injection, no discharge  Oropharynx: no lesions or injection 	  Neck: supple, without adenopathy  Lungs: clear to auscultation  Heart: regular rate and rhythm; mech tones, systolic murmur  Abdomen: soft, nondistended, nontender, without mass or organomegaly  Skin: no rash  Extremities: b/l leg edema and stasis changes; more prominent L erythema  Neurologic: alert, oriented, moves all extremities    LAB RESULTS:                        11.7   9.02  )-----------( 242      ( 06 Jun 2019 05:05 )             35.8     06-07    137  |  101  |  39<H>  ----------------------------<  137<H>  3.5   |  26  |  1.71<H>    Ca    9.2      07 Jun 2019 10:40  Phos  3.3     06-05  Mg     1.9     06-05    TPro  5.7<L>  /  Alb  x   /  TBili  x   /  DBili  x   /  AST  x   /  ALT  x   /  AlkPhos  x   06-06    MICROBIOLOGY:  RECENT CULTURES:  06-04 @ 17:19 .Urine Clean Catch (Midstream)     <10,000 CFU/mL Normal Urogenital Anuradha    06-04 @ 13:15 .Blood Blood-Peripheral Blood Culture PCR    Growth in aerobic bottle: Alpha hemolytic strep  Growth in aerobic bottle: Gram Positive Cocci in Pairs and Chains  Growth in anaerobic bottle: Gram Positive Cocci in Pairs and Chains    06-04 @ 13:10 .Blood Blood-Peripheral     Growth in aerobic bottle: Alpha hemolytic strep  Identification and susceptibility to follow.  Growth in aerobic bottle: Gram Positive Cocci in Pairs and Chains  Growth in anaerobic bottle: Gram Positive Cocci in Pairs and Chains    RADIOLOGY REVIEWED:  Xray Chest 1 View AP/PA (06.04.19 @ 13:42) >  Mild to moderate right base effusion is similar to June 5, 2018.  Left effusion on the earlier study is only minimally present at this time. CC: f/u for possible cellulitis, high grade Alpha Strep bacteremia    Patient reports comfortable, no rest pain, no SOB    REVIEW OF SYSTEMS:  All other review of systems negative (Comprehensive ROS)    Antimicrobials Day # 3   vancomycin  IVPB 500 milliGRAM(s) IV Intermittent every 24 hours    Other Medications Reviewed    T(F): 97.8 (06-07-19 @ 08:48), Max: 98.4 (06-06-19 @ 20:20)  HR: 76 (06-07-19 @ 08:48)  BP: 91/55 (06-07-19 @ 08:48)  RR: 16 (06-07-19 @ 08:48)  SpO2: 94% (06-07-19 @ 08:48)  Wt(kg): --    PHYSICAL EXAM:  General: alert, no acute distress  Eyes:  anicteric, no conjunctival injection, no discharge  Oropharynx: no lesions or injection 	  Neck: supple, without adenopathy  Lungs: clear to auscultation  Heart: regular rate and rhythm; mech tones, systolic murmur  Abdomen: soft, nondistended, nontender, without mass or organomegaly  Skin: no rash  Extremities: b/l leg edema and stasis changes; more prominent L erythema  Neurologic: alert, oriented, moves all extremities    LAB RESULTS:                        11.7   9.02  )-----------( 242      ( 06 Jun 2019 05:05 )             35.8     06-07    137  |  101  |  39<H>  ----------------------------<  137<H>  3.5   |  26  |  1.71<H>    Ca    9.2      07 Jun 2019 10:40  Phos  3.3     06-05  Mg     1.9     06-05    TPro  5.7<L>  /  Alb  x   /  TBili  x   /  DBili  x   /  AST  x   /  ALT  x   /  AlkPhos  x   06-06    MICROBIOLOGY:  RECENT CULTURES:  06-04 @ 17:19 .Urine Clean Catch (Midstream)     <10,000 CFU/mL Normal Urogenital Anuradha    06-04 @ 13:15 .Blood Blood-Peripheral Blood Culture PCR    Growth in aerobic bottle: Alpha hemolytic strep  Growth in aerobic bottle: Gram Positive Cocci in Pairs and Chains  Growth in anaerobic bottle: Gram Positive Cocci in Pairs and Chains    06-04 @ 13:10 .Blood Blood-Peripheral     Growth in aerobic bottle: Alpha hemolytic strep  Identification and susceptibility to follow.  Growth in aerobic bottle: Gram Positive Cocci in Pairs and Chains  Growth in anaerobic bottle: Gram Positive Cocci in Pairs and Chains    RADIOLOGY REVIEWED:  Xray Chest 1 View AP/PA (06.04.19 @ 13:42) >  Mild to moderate right base effusion is similar to June 5, 2018.  Left effusion on the earlier study is only minimally present at this time.    TTE Echo Complete w/Doppler (06.06.19 @ 11:39) >   1. Left ventricular ejection fraction, by visual estimation, is 60%.   2. Normal global left ventricular systolic function.   3. Spectral Doppler shows restrictive pattern of left ventricular   myocardial filling (Grade III diastolic dysfunction).   4. D systolic flattening consistent with severe pulmonary hypertenisn.   5. Severely enlarged right ventricle.   6. Severe tricuspid regurgitation.   7. Aortic valve is normally functioning mechanical prosthetic valve.   8. Mechanical prosthesis in the aortic valve position.   9. Moderate pulmonic valve regurgitation.  10. Estimated pulmonary artery systolic pressure is 135.6 mmHg assuming a   right atrial pressure of 15 mmHg, which is consistent with severe   pulmonary hypertension.  11. Severe pulmonary hypertension is present.  12. Moderate interatrial left to right shunt consider a PFO a patent   formaen ovale.  13. Bernoulli equation may be inaccurate in the setting of severe TR.   elevated qp/qs ratio >2 with right heart enlargement suggests a signicant   intracardiac shunt.  14. Biatrial enlargement suggests restrictive physiology.  15. Aortic doppler consistent with a mature aortic vavle prosthesis.

## 2019-06-07 NOTE — PHYSICAL THERAPY INITIAL EVALUATION ADULT - ADDITIONAL COMMENTS
pt lives in single level private house, 1 aldo w/rail. Home health aide present 24/7, uses rolling walker to ambulate and requires assistance w/ADL's

## 2019-06-07 NOTE — PROGRESS NOTE ADULT - SUBJECTIVE AND OBJECTIVE BOX
Awake, weak    Vital Signs Last 24 Hrs  T(C): 36.6 (06-07-19 @ 20:10), Max: 36.6 (06-07-19 @ 08:48)  T(F): 97.8 (06-07-19 @ 20:10), Max: 97.8 (06-07-19 @ 08:48)  HR: 98 (06-07-19 @ 20:10) (65 - 98)  BP: 99/64 (06-07-19 @ 20:10) (91/55 - 102/49)  RR: 16 (06-07-19 @ 20:10) (16 - 16)  SpO2: 94% (06-07-19 @ 20:10) (94% - 94%)    Card S1S2  Lungs fair air entry b/l  Abd soft, NT, ND  Extr + edema, erythema LLE                                        11.7   9.02  )-----------( 242      ( 06 Jun 2019 05:05 )             35.8     07 Jun 2019 10:40    137    |  101    |  39     ----------------------------<  137    3.5     |  26     |  1.71     Ca    9.2        07 Jun 2019 10:40  Phos  3.3       05 Jun 2019 23:05  Mg     1.9       05 Jun 2019 23:05    TPro  5.7    /  Alb  x      /  TBili  x      /  DBili  x      /  AST  x      /  ALT  x      /  AlkPhos  x      06 Jun 2019 05:05    LIVER FUNCTIONS - ( 06 Jun 2019 05:05 )  Alb: x     / Pro: 5.7 g/dL / ALK PHOS: x     / ALT: x     / AST: x     / GGT: x           PT/INR - ( 07 Jun 2019 10:40 )   PT: 37.6 sec;   INR: 3.24 ratio      aspirin enteric coated 81 milliGRAM(s) Oral daily  atorvastatin 80 milliGRAM(s) Oral at bedtime  escitalopram 5 milliGRAM(s) Oral daily  metoprolol succinate ER 25 milliGRAM(s) Oral daily  mirtazapine 15 milliGRAM(s) Oral at bedtime  pantoprazole    Tablet 40 milliGRAM(s) Oral before breakfast  vancomycin  IVPB 500 milliGRAM(s) IV Intermittent every 24 hours  warfarin 2.5 milliGRAM(s) Oral daily    A/P:    Septic/hemodynamic HENRY on CKD 3  Improving  No nephrotoxins  Renal SONO w/distended bladder  Check PVR  F/u BMP   Avoid hypotension   Strep bacteremia, possibly endocarditis  Abx per ID  F/u Vanco level  Dose meds for CrCl < 30  Will follow

## 2019-06-07 NOTE — PROGRESS NOTE ADULT - ASSESSMENT
Bacteremia...maybe be IE, given 4/4 + BC. Cellulitis.  Chronic valvular disease with CHf/PHT, AF.    Discussed with Dr. Ball and patient regarding prolonged AB.  Not candidate for OHS.  Anticoagulate with INR 3-3.5 ( mechanical AVR and known atrial thrombi)

## 2019-06-07 NOTE — PROGRESS NOTE ADULT - SUBJECTIVE AND OBJECTIVE BOX
Patient is a 89y old  Female who presents with a chief complaint of left lower extremity cellulitis (06 Jun 2019 22:14)      INTERVAL HPI/OVERNIGHT EVENTS: No acute symptoms      REVIEW OF SYSTEMS:  CONSTITUTIONAL: No fever, weight loss, or fatigue  EYES: No eye pain, visual disturbances, or discharge  ENMT:  No difficulty hearing, tinnitus, vertigo; No sinus or throat pain  NECK: No pain or stiffness  BREASTS: No pain, masses, or nipple discharge  RESPIRATORY: No cough, wheezing, chills or hemoptysis; No shortness of breath  CARDIOVASCULAR: No chest pain, palpitations, dizziness, or leg swelling  GASTROINTESTINAL: No abdominal or epigastric pain. No nausea, vomiting, or hematemesis; No diarrhea or constipation. No melena or hematochezia.  GENITOURINARY: No dysuria, frequency, hematuria, or incontinence  NEUROLOGICAL: No headaches, memory loss, loss of strength, numbness, or tremors  SKIN: No itching, burning, rashes, or lesions   LYMPH NODES: No enlarged glands  ENDOCRINE: No heat or cold intolerance; No hair loss  MUSCULOSKELETAL: No joint pain or swelling; No muscle, back, or extremity pain  PSYCHIATRIC: No depression, anxiety, mood swings, or difficulty sleeping  HEME/LYMPH: No easy bruising, or bleeding gums  ALLERY AND IMMUNOLOGIC: No hives or eczema  FAMILY HISTORY:  No significant family history    T(C): 36.3 (06-07-19 @ 05:29), Max: 36.9 (06-06-19 @ 20:20)  HR: 65 (06-07-19 @ 05:29) (65 - 85)  BP: 102/49 (06-07-19 @ 05:29) (96/59 - 111/69)  RR: 16 (06-07-19 @ 05:29) (15 - 17)  SpO2: 94% (06-07-19 @ 05:29) (92% - 96%)  Wt(kg): --Vital Signs Last 24 Hrs  T(C): 36.3 (07 Jun 2019 05:29), Max: 36.9 (06 Jun 2019 20:20)  T(F): 97.4 (07 Jun 2019 05:29), Max: 98.4 (06 Jun 2019 20:20)  HR: 65 (07 Jun 2019 05:29) (65 - 85)  BP: 102/49 (07 Jun 2019 05:29) (96/59 - 111/69)  BP(mean): --  RR: 16 (07 Jun 2019 05:29) (15 - 17)  SpO2: 94% (07 Jun 2019 05:29) (92% - 96%)    T(F): 97.4 (06-07-19 @ 05:29), Max: 98.4 (06-06-19 @ 20:20)  HR: 65 (06-07-19 @ 05:29) (56 - 119)  BP: 102/49 (06-07-19 @ 05:29) (85/57 - 112/49)  RR: 16 (06-07-19 @ 05:29) (15 - 22)  SpO2: 94% (06-07-19 @ 05:29) (82% - 100%)    PHYSICAL EXAM:  GENERAL: NAD, well-groomed, well-developed  HEAD:  Atraumatic, Normocephalic  EYES: EOMI, PERRLA, conjunctiva and sclera clear  ENMT: No tonsillar erythema, exudates, or enlargement; Moist mucous membranes, Good dentition, No lesions  NECK: Supple, No JVD, Normal thyroid  NERVOUS SYSTEM:  Alert & Oriented X3, Good concentration; Motor Strength 5/5 B/L upper and lower extremities; DTRs 2+ intact and symmetric  CHEST/LUNG: Clear to percussion bilaterally; No rales, rhonchi, wheezing, or rubs  HEART: Regular rate and rhythm; No murmurs, rubs, or gallops  ABDOMEN: Soft, Nontender, Nondistended; Bowel sounds present  EXTREMITIES:  2+ Peripheral Pulses, No clubbing, cyanosis, or edema  LYMPH: No lymphadenopathy noted  SKIN: No rashes or lesions    Consultant(s) Notes Reviewed:  [x ] YES  [ ] NO  Care Discussed with Consultants/Other Providers [ x] YES  [ ] NO    LABS:  06-06    136  |  99  |  43<H>  ----------------------------<  109<H>  3.3<L>   |  28  |  1.92<H>    Ca    8.9      06 Jun 2019 05:05  Phos  3.3     06-05  Mg     1.9     06-05    TPro  5.7<L>  /  Alb  x   /  TBili  x   /  DBili  x   /  AST  x   /  ALT  x   /  AlkPhos  x   06-06                          11.7   9.02  )-----------( 242      ( 06 Jun 2019 05:05 )             35.8       Culture - Urine (collected 06-04-19 @ 17:19)  Source: .Urine Clean Catch (Midstream)  Final Report (06-05-19 @ 16:00):    <10,000 CFU/mL Normal Urogenital Anuradha    Culture - Blood (collected 06-04-19 @ 13:15)  Source: .Blood Blood-Peripheral  Gram Stain (06-06-19 @ 15:44):    Growth in aerobic bottle: Gram Positive Cocci in Pairs and Chains    Growth in anaerobic bottle: Gram Positive Cocci in Pairs and Chains  Preliminary Report (06-06-19 @ 15:44):    Growth in aerobic bottle: Gram Positive Cocci in Pairs and Chains    Growth in anaerobic bottle: Gram Positive Cocci in Pairs and Chains    "Due to technical problems, Proteus sp. will Not be reported as part of    the BCID panel until further notice"    ***Blood Panel PCR results on this specimen are available    approximately 3 hours after the Gram stain result.***    Gram stain, PCR, and/or culture results may not always    correspond due to difference in methodologies.    ************************************************************    This PCR assay was performed using [employer] ([employer]).    The following targets are tested for: Enterococcus,    vancomycin resistant enterococci, Listeria monocytogenes,    coagulase negative staphylococci, S. aureus,    methicillin resistant S. aureus, Streptococcus agalactiae    (Group B), S. pneumoniae, S. pyogenes (Group A),    Acinetobacter baumannii, Enterobacter cloacae, E. coli,    Klebsiella oxytoca, K. pneumoniae, Proteus sp.,    Serratia marcescens, Haemophilus influenzae,    Neisseria meningitidis, Pseudomonas aeruginosa, Candida    albicans, C. glabrata, C krusei, C parapsilosis,    C. tropicalis and the KPC resistance gene.  Organism: Blood Culture PCR (06-06-19 @ 07:09)  Organism: Blood Culture PCR (06-06-19 @ 07:09)      -  Streptococcus sp. (Not Grp A, B or S pneumoniae): Detec      Method Type: PCR    Culture - Blood (collected 06-04-19 @ 13:10)  Source: .Blood Blood-Peripheral  Gram Stain (06-07-19 @ 06:06):    Growth in aerobic bottle: Gram Positive Cocci in Pairs and Chains    Growth in anaerobic bottle: Gram Positive Cocci in Pairs and Chains  Preliminary Report (06-07-19 @ 06:06):    Growth in aerobic bottle: Gram Positive Cocci in Pairs and Chains    Growth in anaerobic bottle: Gram Positive Cocci in Pairs and Chains        PT/INR - ( 06 Jun 2019 05:05 )   PT: 30.9 sec;   INR: 2.68 ratio           LIVER FUNCTIONS - ( 06 Jun 2019 05:05 )  Alb: x     / Pro: 5.7 g/dL / ALK PHOS: x     / ALT: x     / AST: x     / GGT: x                            11.7   9.02  )-----------( 242      ( 06-06 @ 05:05 )             35.8                11.3   9.31  )-----------( 187      ( 06-05 @ 06:55 )             34.3                13.1   10.51 )-----------( 196      ( 06-04 @ 13:15 )             39.6               RADIOLOGY & ADDITIONAL TESTS:    Imaging Personally Reviewed:  [ ] YES  [ ] NO  aspirin enteric coated 81 milliGRAM(s) Oral daily  atorvastatin 80 milliGRAM(s) Oral at bedtime  escitalopram 5 milliGRAM(s) Oral daily  metoprolol succinate ER 50 milliGRAM(s) Oral daily  mirtazapine 15 milliGRAM(s) Oral at bedtime  pantoprazole    Tablet 40 milliGRAM(s) Oral before breakfast  potassium chloride    Tablet ER 40 milliEquivalent(s) Oral daily  vancomycin  IVPB 500 milliGRAM(s) IV Intermittent every 24 hours  warfarin 2.5 milliGRAM(s) Oral daily      HEALTH ISSUES - PROBLEM Dx:  Hypokalemia: Hypokalemia  Essential hypertension: Essential hypertension  Anxiety: Anxiety  Atrial fibrillation, unspecified type: Atrial fibrillation, unspecified type  Aortic valve disease: Aortic valve disease  Skin ulcer, unspecified ulcer stage: Skin ulcer, unspecified ulcer stage  HENRY (acute kidney injury): HENRY (acute kidney injury)  Cellulitis of left leg: Cellulitis of left leg

## 2019-06-07 NOTE — CHART NOTE - NSCHARTNOTEFT_GEN_A_CORE
89 yo F,  AVR, MV clip, CHF on coumadin, remote ORIF, here with leg edema and erythema, admitted with cellulitis on Vanco.  Now known to have 4 of 4 Bld Cxs growing Alpha Strep.  Pt. with chronic findings on Echocardiogram.  Per ID eval repeat blood cultures were ordered for 6/8; await final cultures and sensitivities.  Pt. with possible Endocarditis per ID; so extended IV antibx are recommended.  Pt. will probably need a PICC line.  Per Cardiology eval today patient with Atrial clots; keep INR 3>3.5, continue Coumadin.    All labs, imaging and Consultant notes reviewed.  Discussed case with Dr. Russo, no anticipated D/C as of yet.

## 2019-06-08 LAB
ANION GAP SERPL CALC-SCNC: 7 MMOL/L — SIGNIFICANT CHANGE UP (ref 5–17)
BUN SERPL-MCNC: 33 MG/DL — HIGH (ref 7–23)
CALCIUM SERPL-MCNC: 9.4 MG/DL — SIGNIFICANT CHANGE UP (ref 8.4–10.5)
CHLORIDE SERPL-SCNC: 102 MMOL/L — SIGNIFICANT CHANGE UP (ref 96–108)
CO2 SERPL-SCNC: 31 MMOL/L — SIGNIFICANT CHANGE UP (ref 22–31)
CREAT SERPL-MCNC: 1.46 MG/DL — HIGH (ref 0.5–1.3)
CULTURE RESULTS: SIGNIFICANT CHANGE UP
GLUCOSE SERPL-MCNC: 122 MG/DL — HIGH (ref 70–99)
HCT VFR BLD CALC: 37.2 % — SIGNIFICANT CHANGE UP (ref 34.5–45)
HGB BLD-MCNC: 11.9 G/DL — SIGNIFICANT CHANGE UP (ref 11.5–15.5)
INR BLD: 3.57 RATIO — HIGH (ref 0.88–1.16)
MCHC RBC-ENTMCNC: 30.8 PG — SIGNIFICANT CHANGE UP (ref 27–34)
MCHC RBC-ENTMCNC: 32 GM/DL — SIGNIFICANT CHANGE UP (ref 32–36)
MCV RBC AUTO: 96.4 FL — SIGNIFICANT CHANGE UP (ref 80–100)
NRBC # BLD: 0 /100 WBCS — SIGNIFICANT CHANGE UP (ref 0–0)
PLATELET # BLD AUTO: 249 K/UL — SIGNIFICANT CHANGE UP (ref 150–400)
POTASSIUM SERPL-MCNC: 4 MMOL/L — SIGNIFICANT CHANGE UP (ref 3.5–5.3)
POTASSIUM SERPL-SCNC: 4 MMOL/L — SIGNIFICANT CHANGE UP (ref 3.5–5.3)
PROTHROM AB SERPL-ACNC: 41.6 SEC — HIGH (ref 10–12.9)
RBC # BLD: 3.86 M/UL — SIGNIFICANT CHANGE UP (ref 3.8–5.2)
RBC # FLD: 21.9 % — HIGH (ref 10.3–14.5)
SODIUM SERPL-SCNC: 140 MMOL/L — SIGNIFICANT CHANGE UP (ref 135–145)
WBC # BLD: 10.86 K/UL — HIGH (ref 3.8–10.5)
WBC # FLD AUTO: 10.86 K/UL — HIGH (ref 3.8–10.5)

## 2019-06-08 PROCEDURE — 99232 SBSQ HOSP IP/OBS MODERATE 35: CPT

## 2019-06-08 RX ORDER — ACETAMINOPHEN 500 MG
650 TABLET ORAL EVERY 6 HOURS
Refills: 0 | Status: DISCONTINUED | OUTPATIENT
Start: 2019-06-08 | End: 2019-06-21

## 2019-06-08 RX ADMIN — Medication 100 MILLIGRAM(S): at 05:41

## 2019-06-08 RX ADMIN — ATORVASTATIN CALCIUM 80 MILLIGRAM(S): 80 TABLET, FILM COATED ORAL at 21:21

## 2019-06-08 RX ADMIN — ESCITALOPRAM OXALATE 5 MILLIGRAM(S): 10 TABLET, FILM COATED ORAL at 11:41

## 2019-06-08 RX ADMIN — Medication 650 MILLIGRAM(S): at 11:15

## 2019-06-08 RX ADMIN — Medication 650 MILLIGRAM(S): at 20:28

## 2019-06-08 RX ADMIN — WARFARIN SODIUM 2.5 MILLIGRAM(S): 2.5 TABLET ORAL at 21:22

## 2019-06-08 RX ADMIN — MIRTAZAPINE 15 MILLIGRAM(S): 45 TABLET, ORALLY DISINTEGRATING ORAL at 21:22

## 2019-06-08 RX ADMIN — PANTOPRAZOLE SODIUM 40 MILLIGRAM(S): 20 TABLET, DELAYED RELEASE ORAL at 05:41

## 2019-06-08 RX ADMIN — Medication 81 MILLIGRAM(S): at 11:41

## 2019-06-08 RX ADMIN — Medication 650 MILLIGRAM(S): at 10:13

## 2019-06-08 RX ADMIN — Medication 650 MILLIGRAM(S): at 20:58

## 2019-06-08 NOTE — PROGRESS NOTE ADULT - SUBJECTIVE AND OBJECTIVE BOX
Patient is a 89y old  Female who presents with a chief complaint of left lower extremity cellulitis (07 Jun 2019 21:42)      INTERVAL HPI/OVERNIGHT EVENTS:resting      REVIEW OF SYSTEMS:  CONSTITUTIONAL: No fever, weight loss, or fatigue  EYES: No eye pain, visual disturbances, or discharge  ENMT:  No difficulty hearing, tinnitus, vertigo; No sinus or throat pain  NECK: No pain or stiffness  BREASTS: No pain, masses, or nipple discharge  RESPIRATORY: No cough, wheezing, chills or hemoptysis; No shortness of breath  CARDIOVASCULAR: No chest pain, palpitations, dizziness, or leg swelling  GASTROINTESTINAL: No abdominal or epigastric pain. No nausea, vomiting, or hematemesis; No diarrhea or constipation. No melena or hematochezia.  GENITOURINARY: No dysuria, frequency, hematuria, or incontinence  NEUROLOGICAL: No headaches, memory loss, loss of strength, numbness, or tremors  SKIN: No itching, burning, rashes, or lesions   LYMPH NODES: No enlarged glands  ENDOCRINE: No heat or cold intolerance; No hair loss  MUSCULOSKELETAL: No joint pain or swelling; No muscle, back, or extremity pain  PSYCHIATRIC: No depression, anxiety, mood swings, or difficulty sleeping  HEME/LYMPH: No easy bruising, or bleeding gums  ALLERY AND IMMUNOLOGIC: No hives or eczema  FAMILY HISTORY:  No significant family history    T(C): 36.5 (06-08-19 @ 05:34), Max: 36.6 (06-07-19 @ 08:48)  HR: 95 (06-08-19 @ 05:34) (76 - 98)  BP: 104/69 (06-08-19 @ 05:34) (91/55 - 104/69)  RR: 16 (06-08-19 @ 05:34) (16 - 16)  SpO2: 92% (06-08-19 @ 05:34) (92% - 94%)  Wt(kg): --Vital Signs Last 24 Hrs  T(C): 36.5 (08 Jun 2019 05:34), Max: 36.6 (07 Jun 2019 08:48)  T(F): 97.7 (08 Jun 2019 05:34), Max: 97.8 (07 Jun 2019 08:48)  HR: 95 (08 Jun 2019 05:34) (76 - 98)  BP: 104/69 (08 Jun 2019 05:34) (91/55 - 104/69)  BP(mean): --  RR: 16 (08 Jun 2019 05:34) (16 - 16)  SpO2: 92% (08 Jun 2019 05:34) (92% - 94%)    T(F): 97.7 (06-08-19 @ 05:34), Max: 98.4 (06-06-19 @ 20:20)  HR: 95 (06-08-19 @ 05:34) (56 - 98)  BP: 104/69 (06-08-19 @ 05:34) (91/55 - 111/69)  RR: 16 (06-08-19 @ 05:34) (15 - 20)  SpO2: 92% (06-08-19 @ 05:34) (92% - 100%)    PHYSICAL EXAM:  GENERAL: NAD, well-groomed, well-developed  HEAD:  Atraumatic, Normocephalic  EYES: EOMI, PERRLA, conjunctiva and sclera clear  ENMT: No tonsillar erythema, exudates, or enlargement; Moist mucous membranes, Good dentition, No lesions  NECK: Supple, No JVD, Normal thyroid  NERVOUS SYSTEM:  Alert & Oriented X3, Good concentration; Motor Strength 5/5 B/L upper and lower extremities; DTRs 2+ intact and symmetric  CHEST/LUNG: Clear to percussion bilaterally; No rales, rhonchi, wheezing, or rubs  HEART: Regular rate and rhythm; No murmurs, rubs, or gallops  ABDOMEN: Soft, Nontender, Nondistended; Bowel sounds present  EXTREMITIES:  2+ Peripheral Pulses, No clubbing, cyanosis, or edema  LYMPH: No lymphadenopathy noted  SKIN: No rashes or lesions    Consultant(s) Notes Reviewed:  [x ] YES  [ ] NO  Care Discussed with Consultants/Other Providers [ x] YES  [ ] NO    LABS:  06-07    137  |  101  |  39<H>  ----------------------------<  137<H>  3.5   |  26  |  1.71<H>    Ca    9.2      07 Jun 2019 10:40                            11.9   10.86 )-----------( 249      ( 08 Jun 2019 06:23 )             37.2         PT/INR - ( 08 Jun 2019 06:23 )   PT: 41.6 sec;   INR: 3.57 ratio                              11.9   10.86 )-----------( 249      ( 06-08 @ 06:23 )             37.2                11.7   9.02  )-----------( 242      ( 06-06 @ 05:05 )             35.8                11.3   9.31  )-----------( 187      ( 06-05 @ 06:55 )             34.3                13.1   10.51 )-----------( 196      ( 06-04 @ 13:15 )             39.6               RADIOLOGY & ADDITIONAL TESTS:    Imaging Personally Reviewed:  [ ] YES  [ ] NO  aspirin enteric coated 81 milliGRAM(s) Oral daily  atorvastatin 80 milliGRAM(s) Oral at bedtime  escitalopram 5 milliGRAM(s) Oral daily  metoprolol succinate ER 12.5 milliGRAM(s) Oral daily  mirtazapine 15 milliGRAM(s) Oral at bedtime  pantoprazole    Tablet 40 milliGRAM(s) Oral before breakfast  vancomycin  IVPB 500 milliGRAM(s) IV Intermittent every 24 hours  warfarin 2.5 milliGRAM(s) Oral daily      HEALTH ISSUES - PROBLEM Dx:  Bacteremia: Bacteremia  Hypokalemia: Hypokalemia  Essential hypertension: Essential hypertension  Anxiety: Anxiety  Atrial fibrillation, unspecified type: Atrial fibrillation, unspecified type  Aortic valve disease: Aortic valve disease  Skin ulcer, unspecified ulcer stage: Skin ulcer, unspecified ulcer stage  HENRY (acute kidney injury): HENRY (acute kidney injury)  Cellulitis of left leg: Cellulitis of left leg

## 2019-06-08 NOTE — PROGRESS NOTE ADULT - SUBJECTIVE AND OBJECTIVE BOX
Follow up for  endocarditis  SUBJ:    no cardiac symptoms comfortable     PMH  Aortic valve disease  Non-rheumatic mitral regurgitation  HLD (hyperlipidemia)  Melanoma  Diverticulosis of small intestine without hemorrhage  Afib  Anxiety  Essential hypertension      MEDICATIONS  (STANDING):  aspirin enteric coated 81 milliGRAM(s) Oral daily  atorvastatin 80 milliGRAM(s) Oral at bedtime  escitalopram 5 milliGRAM(s) Oral daily  metoprolol succinate ER 12.5 milliGRAM(s) Oral daily  mirtazapine 15 milliGRAM(s) Oral at bedtime  pantoprazole    Tablet 40 milliGRAM(s) Oral before breakfast  vancomycin  IVPB 500 milliGRAM(s) IV Intermittent every 24 hours  warfarin 2.5 milliGRAM(s) Oral daily    MEDICATIONS  (PRN):  acetaminophen   Tablet .. 650 milliGRAM(s) Oral every 6 hours PRN Temp greater or equal to 38C (100.4F), Mild Pain (1 - 3)        PHYSICAL EXAM:  Vital Signs Last 24 Hrs  T(C): 36.4 (08 Jun 2019 09:01), Max: 36.6 (07 Jun 2019 20:10)  T(F): 97.6 (08 Jun 2019 09:01), Max: 97.8 (07 Jun 2019 20:10)  HR: 92 (08 Jun 2019 09:01) (76 - 98)  BP: 91/56 (08 Jun 2019 09:01) (91/56 - 104/69)  BP(mean): --  RR: 16 (08 Jun 2019 09:01) (16 - 16)  SpO2: 98% (08 Jun 2019 09:01) (92% - 98%)    GENERAL: NAD, non toxic appearing  HEAD:  Atraumatic, Normocephalic  EYES: EOMI, PERRLA, conjunctiva and sclera clear  ENT: Moist mucous membranes,  NECK: Supple, No JVD, no bruits  CHEST/LUNG: Clear to percussion bilaterally; No rales, rhonchi, wheezing, or rubs  HEART: Regular rate and rhythm; No murmurs, rubs, or gallops PMI non displaced.  ABDOMEN: Soft, Nontender, Nondistended; Bowel sounds present  EXTREMITIES:  2+ Peripheral Pulses, No clubbing, cyanosis, or edema  SKIN: No rashes or lesions  NERVOUS SYSTEM:  Cranial Nerves II-XII intact      TELEMETRY:    afib     ECG:    < from: 12 Lead ECG (06.06.19 @ 09:18) >  Ventricular Rate 74 BPM    Atrial Rate 50 BPM    QRS Duration 102 ms    Q-T Interval 412 ms    QTC Calculation(Bezet) 457 ms    R Axis 117 degrees    T Axis -17 degrees    Diagnosis Line Atrial fibrillation  Right axis deviation  Incomplete right bundle branch block  Possible Right ventricular hypertrophy  Nonspecific ST abnormality  Abnormal QRS-T angle, consider primary T wave abnormality  Abnormal ECG  When compared with ECG of 06-JUN-2019 06:42,  No significant change was found    ESSENCE Johnson MD ([id number]) on 6/6/2019 3:29:17 PM    < end of copied text >    ECHO:    < from: TTE Echo Complete w/Doppler (06.06.19 @ 11:39) >  Summary:   1. Left ventricular ejection fraction, by visual estimation, is 60%.   2. Normal global left ventricular systolic function.   3. Spectral Doppler shows restrictive pattern of left ventricular   myocardial filling (Grade III diastolic dysfunction).   4. D systolic flattening consistent with severe pulmonary hypertenisn.   5. Severely enlarged right ventricle.   6. Severe tricuspid regurgitation.   7. Aortic valve is normally functioning mechanical prosthetic valve.   8. Mechanical prosthesis in the aortic valve position.   9. Moderate pulmonic valve regurgitation.  10. Estimated pulmonary artery systolic pressure is 135.6 mmHg assuming a   right atrial pressure of 15 mmHg, which is consistent with severe   pulmonary hypertension.  11. Severe pulmonary hypertension is present.  12. Moderate interatrial left to right shunt consider a PFO a patent   formaen ovale.  13. Bernoulli equation may be inaccurate in the setting of severe TR.   elevated qp/qs ratio >2 with right heart enlargement suggests a signicant   intracardiac shunt.  14. Biatrial enlargement suggests restrictive physiology.  15. Aortic doppler consistent with a mature aortic vavle prosthesis.    [id number] Essence Simeon , Electronically signed on 6/6/2019 at 3:13:24   PM     *** Final ***    ESSENCE SIMEON   This document has been electronically signed. Jun 6 2019 11:39AM    < end of copied text >      LABS:                        11.9   10.86 )-----------( 249      ( 08 Jun 2019 06:23 )             37.2     06-08    140  |  102  |  33<H>  ----------------------------<  122<H>  4.0   |  31  |  1.46<H>    Ca    9.4      08 Jun 2019 06:23    PT/INR - ( 08 Jun 2019 06:23 )   PT: 41.6 sec;   INR: 3.57 ratio         I&O's Summary    07 Jun 2019 07:01  -  08 Jun 2019 07:00  --------------------------------------------------------  IN: 700 mL / OUT: 0 mL / NET: 700 mL      BNP    RADIOLOGY & ADDITIONAL STUDIES:    < from: Xray Chest 1 View AP/PA (06.04.19 @ 13:42) >  EXAM:  XR CHEST AP OR PA 1V      PROCEDURE DATE:  06/04/2019        INTERPRETATION:  AP chest on June 4, 2019 at 1:11 PM. Patient has sepsis   and leg cellulitis.    Heart is magnified by technique. Prosthetic heart valves again noted.    Mild to moderate right base effusion is similar to June 5, 2018.    Left effusion on the earlier study is only minimally present at this time.    IMPRESSION: As above.      KASSANDRA LEAL M.D., ATTENDING RADIOLOGIST  This document has been electronically signed. Jun 4 2019  2:03PM    < end of copied text >

## 2019-06-08 NOTE — PROGRESS NOTE ADULT - ASSESSMENT
infectious disease note appreciated....await sensivities and continue IV VANCO for now...agree with treating prolonged for possible endocarditis  ...? role MT

## 2019-06-08 NOTE — PROGRESS NOTE ADULT - SUBJECTIVE AND OBJECTIVE BOX
CC: f/u for possible cellulitis, high grade Alpha Strep bacteremia    Patient remains comfortable, no rest pain, no SOB    REVIEW OF SYSTEMS:  All other review of systems negative (Comprehensive ROS)    Antimicrobials Day # 4   vancomycin  IVPB 500 milliGRAM(s) IV Intermittent every 24 hours    Other Medications Reviewed    Vital Signs Last 24 Hrs  T(F): 97.4 (08 Jun 2019 16:04), Max: 97.8 (07 Jun 2019 20:10)  HR: 92 (08 Jun 2019 16:04) (92 - 98)  BP: 92/48 (08 Jun 2019 16:04) (91/56 - 104/69)  BP(mean): --  RR: 16 (08 Jun 2019 16:04) (16 - 16)  SpO2: 92% (08 Jun 2019 16:04) (92% - 98%)    PHYSICAL EXAM:  General: alert, no acute distress  Eyes:  anicteric, no conjunctival injection, no discharge  Oropharynx: no lesions or injection 	  Neck: supple, without adenopathy  Lungs: clear to auscultation  Heart: regular rate and rhythm; mech tones, systolic murmur  Abdomen: soft, nondistended, nontender, without mass or organomegaly  Skin: no rash  Extremities: b/l leg edema less, still more prominent L erythema  Neurologic: alert, oriented, moves all extremities    LAB RESULTS:                        11.9   10.86 )-----------( 249      ( 08 Jun 2019 06:23 )             37.2   06-08    140  |  102  |  33<H>  ----------------------------<  122<H>  4.0   |  31  |  1.46<H>    Ca    9.4      08 Jun 2019 06:23    MICROBIOLOGY:  RECENT CULTURES:  Culture - Blood (06.04.19 @ 13:15)    Growth in aerobic and anaerobic bottles: Streptococcus salivarius vestibularis group    Culture - Blood (06.04.19 @ 13:10)    Gram Stain:   Growth in aerobic and anaerobic bottles: Streptococcus salivarius vestibularis group  Susceptibility to follow.      RADIOLOGY REVIEWED:  Xray Chest 1 View AP/PA (06.04.19 @ 13:42) >  Mild to moderate right base effusion is similar to June 5, 2018.  Left effusion on the earlier study is only minimally present at this time.    TTE Echo Complete w/Doppler (06.06.19 @ 11:39) >   1. Left ventricular ejection fraction, by visual estimation, is 60%.   2. Normal global left ventricular systolic function.   3. Spectral Doppler shows restrictive pattern of left ventricular   myocardial filling (Grade III diastolic dysfunction).   4. D systolic flattening consistent with severe pulmonary hypertenisn.   5. Severely enlarged right ventricle.   6. Severe tricuspid regurgitation.   7. Aortic valve is normally functioning mechanical prosthetic valve.   8. Mechanical prosthesis in the aortic valve position.   9. Moderate pulmonic valve regurgitation.  10. Estimated pulmonary artery systolic pressure is 135.6 mmHg assuming a   right atrial pressure of 15 mmHg, which is consistent with severe   pulmonary hypertension.  11. Severe pulmonary hypertension is present.  12. Moderate interatrial left to right shunt consider a PFO a patent   formaen ovale.  13. Bernoulli equation may be inaccurate in the setting of severe TR.   elevated qp/qs ratio >2 with right heart enlargement suggests a signicant   intracardiac shunt.  14. Biatrial enlargement suggests restrictive physiology.  15. Aortic doppler consistent with a mature aortic vavle prosthesis.

## 2019-06-08 NOTE — PROGRESS NOTE ADULT - ASSESSMENT
89 yo F,  AVR, MV clip, CHF on coumadin, remote ORIF, here with leg edema, more in way of L leg erythema, covered for cellulitis with Vanco  Now known to have 4 of 4 Bld Cxs S salivarius, which typically does not cause cellulitis  Afebrile, normal WBC  Chronic findings on ECHO  Although difficult to totally exclude component of cellulitis, I suspect more likely stasis exacerbation  Still most concerned abt PVE- will have to treat accordingly    Plan:  Repeat Bld Cxs sent- if negative, would place PICC  Await PCN MIRIAM of Bld isolate- hope to switch to Ceftriaxone  Vanco for now  D/w pt and son at length- Bld Cx results, concern for PVE, natural Hx and complications, 6 wk course, all reviewed

## 2019-06-08 NOTE — CHART NOTE - NSCHARTNOTEFT_GEN_A_CORE
Reviewed prior progress notes, labs and imaging.    Discussed with Rafaela.    Primary Diagnosis: bacteremia, suspect endocarditis    Plan:   89 yo F,  AVR, MV clip, CHF on coumadin, remote ORIF, here with leg edema and erythema, admitted with cellulitis on Vanco.  Now known to have 4 of 4 Bld Cxs growing Alpha Strep.  Pt. with chronic findings on Echocardiogram.   ID consult appreciated:  repeat blood cultures were ordered for 6/8; await final cultures and sensitivities.  Pt. with possible Endocarditis extended IV antibx are recommended.  Pt. will probably need a PICC line.  Cardiac consult appreciated: Anticoagulate with INR 3-3.5 ( mechanical AVR and known atrial thrombi)  Pt. with HENRY on CKD, nephrology consult appreciated: Septic/hemodynamic HENRY on CKD 3 - Improving  No nephrotoxins; Renal SONO w/distended bladder; Check PVR; monitor creatinine     Anticipated Discharge: no anticipated discharge for now, needs to be cleared by ID

## 2019-06-09 LAB
-  CEFTRIAXONE: SIGNIFICANT CHANGE UP
-  CLINDAMYCIN: SIGNIFICANT CHANGE UP
-  ERYTHROMYCIN: SIGNIFICANT CHANGE UP
-  LEVOFLOXACIN: SIGNIFICANT CHANGE UP
-  PENICILLIN: SIGNIFICANT CHANGE UP
-  VANCOMYCIN: SIGNIFICANT CHANGE UP
ANION GAP SERPL CALC-SCNC: 11 MMOL/L — SIGNIFICANT CHANGE UP (ref 5–17)
BUN SERPL-MCNC: 30 MG/DL — HIGH (ref 7–23)
CALCIUM SERPL-MCNC: 9.5 MG/DL — SIGNIFICANT CHANGE UP (ref 8.4–10.5)
CHLORIDE SERPL-SCNC: 100 MMOL/L — SIGNIFICANT CHANGE UP (ref 96–108)
CO2 SERPL-SCNC: 24 MMOL/L — SIGNIFICANT CHANGE UP (ref 22–31)
CREAT SERPL-MCNC: 1.34 MG/DL — HIGH (ref 0.5–1.3)
CULTURE RESULTS: SIGNIFICANT CHANGE UP
GLUCOSE SERPL-MCNC: 131 MG/DL — HIGH (ref 70–99)
GRAM STN FLD: SIGNIFICANT CHANGE UP
GRAM STN FLD: SIGNIFICANT CHANGE UP
HCT VFR BLD CALC: 39.5 % — SIGNIFICANT CHANGE UP (ref 34.5–45)
HGB BLD-MCNC: 12.7 G/DL — SIGNIFICANT CHANGE UP (ref 11.5–15.5)
INR BLD: 3.84 RATIO — HIGH (ref 0.88–1.16)
MCHC RBC-ENTMCNC: 31.4 PG — SIGNIFICANT CHANGE UP (ref 27–34)
MCHC RBC-ENTMCNC: 32.2 GM/DL — SIGNIFICANT CHANGE UP (ref 32–36)
MCV RBC AUTO: 97.5 FL — SIGNIFICANT CHANGE UP (ref 80–100)
METHOD TYPE: SIGNIFICANT CHANGE UP
NRBC # BLD: 0 /100 WBCS — SIGNIFICANT CHANGE UP (ref 0–0)
ORGANISM # SPEC MICROSCOPIC CNT: SIGNIFICANT CHANGE UP
P AERUGINOSA DNA BLD POS NAA+NON-PROBE: SIGNIFICANT CHANGE UP
PLATELET # BLD AUTO: 237 K/UL — SIGNIFICANT CHANGE UP (ref 150–400)
POTASSIUM SERPL-MCNC: 4 MMOL/L — SIGNIFICANT CHANGE UP (ref 3.5–5.3)
POTASSIUM SERPL-SCNC: 4 MMOL/L — SIGNIFICANT CHANGE UP (ref 3.5–5.3)
PROTHROM AB SERPL-ACNC: 44.8 SEC — HIGH (ref 10–12.9)
RBC # BLD: 4.05 M/UL — SIGNIFICANT CHANGE UP (ref 3.8–5.2)
RBC # FLD: 22.1 % — HIGH (ref 10.3–14.5)
SODIUM SERPL-SCNC: 135 MMOL/L — SIGNIFICANT CHANGE UP (ref 135–145)
SPECIMEN SOURCE: SIGNIFICANT CHANGE UP
WBC # BLD: 12.95 K/UL — HIGH (ref 3.8–10.5)
WBC # FLD AUTO: 12.95 K/UL — HIGH (ref 3.8–10.5)

## 2019-06-09 PROCEDURE — 99232 SBSQ HOSP IP/OBS MODERATE 35: CPT

## 2019-06-09 RX ORDER — METOPROLOL TARTRATE 50 MG
12.5 TABLET ORAL DAILY
Refills: 0 | Status: DISCONTINUED | OUTPATIENT
Start: 2019-06-09 | End: 2019-06-09

## 2019-06-09 RX ORDER — METOPROLOL TARTRATE 50 MG
12.5 TABLET ORAL DAILY
Refills: 0 | Status: DISCONTINUED | OUTPATIENT
Start: 2019-06-10 | End: 2019-06-11

## 2019-06-09 RX ORDER — METOPROLOL TARTRATE 50 MG
12.5 TABLET ORAL ONCE
Refills: 0 | Status: COMPLETED | OUTPATIENT
Start: 2019-06-09 | End: 2019-06-09

## 2019-06-09 RX ORDER — CEFEPIME 1 G/1
2000 INJECTION, POWDER, FOR SOLUTION INTRAMUSCULAR; INTRAVENOUS EVERY 12 HOURS
Refills: 0 | Status: DISCONTINUED | OUTPATIENT
Start: 2019-06-10 | End: 2019-06-13

## 2019-06-09 RX ORDER — CEFEPIME 1 G/1
INJECTION, POWDER, FOR SOLUTION INTRAMUSCULAR; INTRAVENOUS
Refills: 0 | Status: DISCONTINUED | OUTPATIENT
Start: 2019-06-09 | End: 2019-06-13

## 2019-06-09 RX ORDER — CEFEPIME 1 G/1
2000 INJECTION, POWDER, FOR SOLUTION INTRAMUSCULAR; INTRAVENOUS ONCE
Refills: 0 | Status: COMPLETED | OUTPATIENT
Start: 2019-06-09 | End: 2019-06-09

## 2019-06-09 RX ADMIN — ESCITALOPRAM OXALATE 5 MILLIGRAM(S): 10 TABLET, FILM COATED ORAL at 11:38

## 2019-06-09 RX ADMIN — MIRTAZAPINE 15 MILLIGRAM(S): 45 TABLET, ORALLY DISINTEGRATING ORAL at 21:39

## 2019-06-09 RX ADMIN — Medication 650 MILLIGRAM(S): at 15:30

## 2019-06-09 RX ADMIN — CEFEPIME 100 MILLIGRAM(S): 1 INJECTION, POWDER, FOR SOLUTION INTRAMUSCULAR; INTRAVENOUS at 21:38

## 2019-06-09 RX ADMIN — Medication 650 MILLIGRAM(S): at 15:23

## 2019-06-09 RX ADMIN — Medication 650 MILLIGRAM(S): at 22:39

## 2019-06-09 RX ADMIN — ATORVASTATIN CALCIUM 80 MILLIGRAM(S): 80 TABLET, FILM COATED ORAL at 21:39

## 2019-06-09 RX ADMIN — Medication 12.5 MILLIGRAM(S): at 08:57

## 2019-06-09 RX ADMIN — Medication 81 MILLIGRAM(S): at 11:38

## 2019-06-09 RX ADMIN — PANTOPRAZOLE SODIUM 40 MILLIGRAM(S): 20 TABLET, DELAYED RELEASE ORAL at 06:25

## 2019-06-09 RX ADMIN — Medication 100 MILLIGRAM(S): at 07:48

## 2019-06-09 RX ADMIN — Medication 650 MILLIGRAM(S): at 21:39

## 2019-06-09 NOTE — PROGRESS NOTE ADULT - SUBJECTIVE AND OBJECTIVE BOX
Patient is a 89y old  Female who presents with a chief complaint of left lower extremity cellulitis (08 Jun 2019 18:53)      INTERVAL HPI/OVERNIGHT EVENTS: no complaints      REVIEW OF SYSTEMS:  CONSTITUTIONAL: No fever, weight loss, or fatigue  EYES: No eye pain, visual disturbances, or discharge  ENMT:  No difficulty hearing, tinnitus, vertigo; No sinus or throat pain  NECK: No pain or stiffness  BREASTS: No pain, masses, or nipple discharge  RESPIRATORY: No cough, wheezing, chills or hemoptysis; No shortness of breath  CARDIOVASCULAR: No chest pain, palpitations, dizziness, or leg swelling  GASTROINTESTINAL: No abdominal or epigastric pain. No nausea, vomiting, or hematemesis; No diarrhea or constipation. No melena or hematochezia.  GENITOURINARY: No dysuria, frequency, hematuria, or incontinence  NEUROLOGICAL: No headaches, memory loss, loss of strength, numbness, or tremors  SKIN: No itching, burning, rashes, or lesions   LYMPH NODES: No enlarged glands  ENDOCRINE: No heat or cold intolerance; No hair loss  MUSCULOSKELETAL: No joint pain or swelling; No muscle, back, or extremity pain  PSYCHIATRIC: No depression, anxiety, mood swings, or difficulty sleeping  HEME/LYMPH: No easy bruising, or bleeding gums  ALLERY AND IMMUNOLOGIC: No hives or eczema  FAMILY HISTORY:  No significant family history    T(C): 36.8 (06-09-19 @ 06:36), Max: 36.8 (06-09-19 @ 06:36)  HR: 79 (06-09-19 @ 06:36) (79 - 92)  BP: 104/47 (06-09-19 @ 06:36) (91/56 - 104/47)  RR: 16 (06-09-19 @ 06:36) (16 - 16)  SpO2: 96% (06-09-19 @ 06:36) (92% - 99%)  Wt(kg): --Vital Signs Last 24 Hrs  T(C): 36.8 (09 Jun 2019 06:36), Max: 36.8 (09 Jun 2019 06:36)  T(F): 98.2 (09 Jun 2019 06:36), Max: 98.2 (09 Jun 2019 06:36)  HR: 79 (09 Jun 2019 06:36) (79 - 92)  BP: 104/47 (09 Jun 2019 06:36) (91/56 - 104/47)  BP(mean): --  RR: 16 (09 Jun 2019 06:36) (16 - 16)  SpO2: 96% (09 Jun 2019 06:36) (92% - 99%)    T(F): 98.2 (06-09-19 @ 06:36), Max: 98.4 (06-06-19 @ 20:20)  HR: 79 (06-09-19 @ 06:36) (65 - 98)  BP: 104/47 (06-09-19 @ 06:36) (91/55 - 111/69)  RR: 16 (06-09-19 @ 06:36) (15 - 17)  SpO2: 96% (06-09-19 @ 06:36) (92% - 99%)    PHYSICAL EXAM:  GENERAL: NAD, well-groomed, well-developed  HEAD:  Atraumatic, Normocephalic  EYES: EOMI, PERRLA, conjunctiva and sclera clear  ENMT: No tonsillar erythema, exudates, or enlargement; Moist mucous membranes, Good dentition, No lesions  NECK: Supple, No JVD, Normal thyroid  NERVOUS SYSTEM:  Alert & Oriented X3, Good concentration; Motor Strength 5/5 B/L upper and lower extremities; DTRs 2+ intact and symmetric  CHEST/LUNG: Clear to percussion bilaterally; No rales, rhonchi, wheezing, or rubs  HEART: Regular rate and rhythm; No murmurs, rubs, or gallops  ABDOMEN: Soft, Nontender, Nondistended; Bowel sounds present  EXTREMITIES:  2+ Peripheral Pulses, No clubbing, cyanosis, or edema  LYMPH: No lymphadenopathy noted  SKIN: No rashes or lesions    Consultant(s) Notes Reviewed:  [x ] YES  [ ] NO  Care Discussed with Consultants/Other Providers [ x] YES  [ ] NO    LABS:  06-08    140  |  102  |  33<H>  ----------------------------<  122<H>  4.0   |  31  |  1.46<H>    Ca    9.4      08 Jun 2019 06:23                            11.9   10.86 )-----------( 249      ( 08 Jun 2019 06:23 )             37.2       Culture - Blood (collected 06-08-19 @ 06:23)  Source: .Blood Blood  Gram Stain (06-09-19 @ 02:56):    Growth in aerobic bottle: Gram Negative Rods  Preliminary Report (06-09-19 @ 02:57):    Growth in aerobic bottle: Gram Negative Rods    "Due to technical problems, Proteus sp. will Not be reported as part of    the BCID panel until further notice"    ***Blood Panel PCR results on this specimen are available    approximately 3 hours after the Gram stain result.***    Gram stain, PCR, and/or culture results may not always    correspond due to difference in methodologies.    ************************************************************    This PCR assay was performed using [employer].    The following targets are tested for: Enterococcus,    vancomycin resistant enterococci, Listeria monocytogenes,    coagulase negative staphylococci, S. aureus,    methicillin resistant S. aureus, Streptococcus agalactiae    (Group B), S. pneumoniae, S. pyogenes (Group A),    Acinetobacter baumannii, Enterobacter cloacae, E. coli,    Klebsiella oxytoca, K. pneumoniae, Proteus sp.,    Serratia marcescens, Haemophilus influenzae,    Neisseria meningitidis, Pseudomonas aeruginosa, Candida    albicans, C. glabrata, C krusei, C parapsilosis,    C. tropicalis and the KPC resistance gene.  Organism: Blood Culture PCR (06-09-19 @ 04:27)  Organism: Blood Culture PCR (06-09-19 @ 04:27)      -  Pseudomonas aeruginosa: Detec      Method Type: PCR    Culture - Blood (collected 06-08-19 @ 06:23)  Source: .Blood Blood  Gram Stain (06-09-19 @ 03:59):    Growth in aerobic bottle: Gram Negative Rods  Preliminary Report (06-09-19 @ 03:59):    Growth in aerobic bottle: Gram Negative Rods        PT/INR - ( 08 Jun 2019 06:23 )   PT: 41.6 sec;   INR: 3.57 ratio                              11.9   10.86 )-----------( 249      ( 06-08 @ 06:23 )             37.2                11.7   9.02  )-----------( 242      ( 06-06 @ 05:05 )             35.8                11.3   9.31  )-----------( 187      ( 06-05 @ 06:55 )             34.3                13.1   10.51 )-----------( 196      ( 06-04 @ 13:15 )             39.6               RADIOLOGY & ADDITIONAL TESTS:    Imaging Personally Reviewed:  [ ] YES  [ ] NO  acetaminophen   Tablet .. 650 milliGRAM(s) Oral every 6 hours PRN  aspirin enteric coated 81 milliGRAM(s) Oral daily  atorvastatin 80 milliGRAM(s) Oral at bedtime  escitalopram 5 milliGRAM(s) Oral daily  metoprolol succinate ER 12.5 milliGRAM(s) Oral daily  mirtazapine 15 milliGRAM(s) Oral at bedtime  pantoprazole    Tablet 40 milliGRAM(s) Oral before breakfast  vancomycin  IVPB 500 milliGRAM(s) IV Intermittent every 24 hours      HEALTH ISSUES - PROBLEM Dx:  Bacteremia: Bacteremia  Hypokalemia: Hypokalemia  Essential hypertension: Essential hypertension  Anxiety: Anxiety  Atrial fibrillation, unspecified type: Atrial fibrillation, unspecified type  Aortic valve disease: Aortic valve disease  Skin ulcer, unspecified ulcer stage: Skin ulcer, unspecified ulcer stage  HENRY (acute kidney injury): HENRY (acute kidney injury)  Cellulitis of left leg: Cellulitis of left leg

## 2019-06-09 NOTE — PROGRESS NOTE ADULT - SUBJECTIVE AND OBJECTIVE BOX
CC: f/u for possible cellulitis, high grade Alpha Strep bacteremia    Patient remains comfortable, leg is improving    REVIEW OF SYSTEMS:  All other review of systems negative (Comprehensive ROS)    Antimicrobials Day # 5   vancomycin  IVPB 500 milliGRAM(s) IV Intermittent every 24 hours    Other Medications Reviewed    PHYSICAL EXAM:  General: alert, no acute distress  Eyes:  anicteric, no conjunctival injection, no discharge  Oropharynx: no lesions or injection 	  Neck: supple, without adenopathy  Lungs: clear to auscultation  Heart: regular rate and rhythm; mech tones, systolic murmur  Abdomen: soft, nondistended, nontender, without mass or organomegaly  Skin: no rash  Extremities: b/l leg edema less, L erythema fading, improved  Neurologic: alert, oriented, moves all extremities    LAB RESULTS:                        12.7   12.95 )-----------( 237      ( 09 Jun 2019 10:50 )             39.5   06-09    135  |  100  |  30<H>  ----------------------------<  131<H>  4.0   |  24  |  1.34<H>    Ca    9.5      09 Jun 2019 10:50    MICROBIOLOGY:  RECENT CULTURES:  Culture - Blood (06.04.19 @ 13:15)    Growth in aerobic and anaerobic bottles: Streptococcus salivarius vestibularis group  PCN MIRIAM 0.24 I, CTX 0.19 S    Culture - Blood (06.04.19 @ 13:10)    Gram Stain:   Growth in aerobic and anaerobic bottles: Streptococcus salivarius vestibularis group  Susceptibility to follow.    Culture - Blood in AM (06.08.19 @ 06:23)    Gram Stain:   Growth in aerobic bottle: Gram Negative Rods    -  Pseudomonas aeruginosa: Detec      Culture - Blood in AM (06.08.19 @ 06:23)    Gram Stain:   Growth in aerobic bottle: Gram Negative Rods    Specimen Source: .Blood Blood    Culture Results:   Growth in aerobic bottle: Gram Negative Rods    RADIOLOGY REVIEWED:  Xray Chest 1 View AP/PA (06.04.19 @ 13:42) >  Mild to moderate right base effusion is similar to June 5, 2018.  Left effusion on the earlier study is only minimally present at this time.    TTE Echo Complete w/Doppler (06.06.19 @ 11:39) >   1. Left ventricular ejection fraction, by visual estimation, is 60%.   2. Normal global left ventricular systolic function.   3. Spectral Doppler shows restrictive pattern of left ventricular   myocardial filling (Grade III diastolic dysfunction).   4. D systolic flattening consistent with severe pulmonary hypertenisn.   5. Severely enlarged right ventricle.   6. Severe tricuspid regurgitation.   7. Aortic valve is normally functioning mechanical prosthetic valve.   8. Mechanical prosthesis in the aortic valve position.   9. Moderate pulmonic valve regurgitation.  10. Estimated pulmonary artery systolic pressure is 135.6 mmHg assuming a   right atrial pressure of 15 mmHg, which is consistent with severe   pulmonary hypertension.  11. Severe pulmonary hypertension is present.  12. Moderate interatrial left to right shunt consider a PFO a patent   formaen ovale.  13. Bernoulli equation may be inaccurate in the setting of severe TR.   elevated qp/qs ratio >2 with right heart enlargement suggests a signicant   intracardiac shunt.  14. Biatrial enlargement suggests restrictive physiology.  15. Aortic doppler consistent with a mature aortic vavle prosthesis.

## 2019-06-09 NOTE — PROGRESS NOTE ADULT - ASSESSMENT
89 yo F,  AVR, MV clip, CHF on coumadin, remote ORIF, here with leg edema, more in way of L leg erythema, covered for cellulitis with Vanco  Now known to have 4 of 4 Bld Cxs S salivarius, which typically does not cause cellulitis  Afebrile, normal WBC  Chronic findings on ECHO  Difficult to totally exclude component of cellulitis, Alpha Strep always more concerning for endocarditis in this setting  Repeat Bld Cxs sent to assess for clearance, while pt afebrile, feeling better, Creat and L leg improving  Thus, finding of Pseudomonas on repeat Bld Cxs as above very difficult to correlate    Plan:  Repeat Bld Cxs now  D/c Vanco  Start Cefepime 2 g IV q 12  Further w/u to be determined  d/w pt at length- PVE, Strep, and now Pseudomonas despite clinical improvement 89 yo F,  AVR, MV clip, CHF on coumadin, remote ORIF, here with leg edema, more in way of L leg erythema, covered for cellulitis with Vanco  Now known to have 4 of 4 Bld Cxs S salivarius, which typically does not cause cellulitis  Afebrile, normal WBC  Chronic findings on ECHO  Difficult to totally exclude component of cellulitis, Alpha Strep always more concerning for endocarditis in this setting  Repeat Bld Cxs sent to assess for clearance, while pt afebrile, feeling better, Creat and L leg improving  Thus, finding of Pseudomonas on repeat Bld Cxs as above (PCR) very difficult to correlate    Plan:  Repeat Bld Cxs now  D/c Vanco  Start Cefepime 2 g IV q 12  Await final Micro  Further w/u to be determined  d/w pt at length- PVE, Strep, and now Pseudomonas despite clinical improvement

## 2019-06-09 NOTE — PROGRESS NOTE ADULT - SUBJECTIVE AND OBJECTIVE BOX
Follow up for prosthetic endocarditis    SUBJ:    alert cheerful. no cardiac complaints    PMH  Aortic valve disease  Non-rheumatic mitral regurgitation  HLD (hyperlipidemia)  Melanoma  Diverticulosis of small intestine without hemorrhage  Afib  Anxiety  Essential hypertension      MEDICATIONS  (STANDING):  aspirin enteric coated 81 milliGRAM(s) Oral daily  atorvastatin 80 milliGRAM(s) Oral at bedtime  escitalopram 5 milliGRAM(s) Oral daily  mirtazapine 15 milliGRAM(s) Oral at bedtime  pantoprazole    Tablet 40 milliGRAM(s) Oral before breakfast  vancomycin  IVPB 500 milliGRAM(s) IV Intermittent every 24 hours    MEDICATIONS  (PRN):  acetaminophen   Tablet .. 650 milliGRAM(s) Oral every 6 hours PRN Temp greater or equal to 38C (100.4F), Mild Pain (1 - 3)        PHYSICAL EXAM:  Vital Signs Last 24 Hrs  T(C): 36.3 (09 Jun 2019 09:21), Max: 36.8 (09 Jun 2019 06:36)  T(F): 97.4 (09 Jun 2019 09:21), Max: 98.2 (09 Jun 2019 06:36)  HR: 85 (09 Jun 2019 09:21) (79 - 92)  BP: 103/56 (09 Jun 2019 09:21) (92/48 - 104/47)  BP(mean): --  RR: 15 (09 Jun 2019 09:21) (15 - 16)  SpO2: 95% (09 Jun 2019 09:21) (92% - 99%)    GENERAL: NAD, well-groomed, well-developed  HEAD:  Atraumatic, Normocephalic  EYES: EOMI, PERRLA, conjunctiva and sclera clear  ENT: Moist mucous membranes,  NECK: Supple, No JVD, no bruits  CHEST/LUNG: Clear to percussion bilaterally; No rales, rhonchi, wheezing, or rubs  HEART: Regular rate and rhythm; No murmurs, rubs, or gallops PMI non displaced.  ABDOMEN: Soft, Nontender, Nondistended; Bowel sounds present  EXTREMITIES:  2+ Peripheral Pulses, No clubbing, cyanosis, or edema  SKIN: No rashes or lesions  NERVOUS SYSTEM:  Cranial Nerves II-XII intact      TELEMETRY:    rapid afib    ECG:    < from: 12 Lead ECG (06.06.19 @ 09:18) >  Ventricular Rate 74 BPM    Atrial Rate 50 BPM    QRS Duration 102 ms    Q-T Interval 412 ms    QTC Calculation(Bezet) 457 ms    R Axis 117 degrees    T Axis -17 degrees    Diagnosis Line Atrial fibrillation  Right axis deviation  Incomplete right bundle branch block  Possible Right ventricular hypertrophy  Nonspecific ST abnormality  Abnormal QRS-T angle, consider primary T wave abnormality  Abnormal ECG  When compared with ECG of 06-JUN-2019 06:42,  No significant change was found    Confirmedby ESSENCE SIMEON MD ([id number]) on 6/6/2019 3:29:17 PM    < end of copied text >    ECHO:    LABS:                        12.7   12.95 )-----------( 237      ( 09 Jun 2019 10:50 )             39.5     06-09    135  |  100  |  30<H>  ----------------------------<  131<H>  4.0   |  24  |  1.34<H>    Ca    9.5      09 Jun 2019 10:50        PT/INR - ( 09 Jun 2019 10:50 )   PT: 44.8 sec;   INR: 3.84 ratio     Culture - Blood in AM (06.08.19 @ 06:23)    Gram Stain:   Growth in aerobic bottle: Gram Negative Rods    -  Pseudomonas aeruginosa: Detec    Specimen Source: .Blood Blood    Organism: Blood Culture PCR    Culture Results:   Growth in aerobic bottle: Gram Negative Rods  "Due to technical problems, Proteus sp. will Not be reported as part of  the BCID panel until further notice"  ***Blood Panel PCR results on this specimen are available  approximately 3 hours after the Gram stain result.***  Gram stain, PCR, and/or culture results may not always  correspond due to difference in methodologies.  ************************************************************  This PCR assay was performed using [employer].  The following targets are tested for: Enterococcus,  vancomycin resistant enterococci, Listeria monocytogenes,  coagulase negative staphylococci, S. aureus,  methicillin resistant S. aureus, Streptococcus agalactiae  (Group B), S. pneumoniae, S. pyogenes (Group A),  Acinetobacter baumannii, Enterobacter cloacae, E. coli,  Klebsiella oxytoca, K. pneumoniae, Proteus sp.,  Serratia marcescens, Haemophilus influenzae,  Neisseria meningitidis, Pseudomonas aeruginosa, Candida  albicans, C. glabrata, C krusei, C parapsilosis,  C. tropicalis and the KPC resistance gene.    Organism Identification: Blood Culture PCR    Method Type: PCR       Gram Stain:   Growth in aerobic bottle: Gram Negative Rods (06.08.19 @ 06:23)    Culture - Blood in AM (06.08.19 @ 06:23)    Gram Stain:   Growth in aerobic bottle: Gram Negative Rods    Specimen Source: .Blood Blood    Culture Results:   Growth in aerobic bottle: Gram Negative Rods            I&O's Summary    08 Jun 2019 07:01  -  09 Jun 2019 07:00  --------------------------------------------------------  IN: 1110 mL / OUT: 0 mL / NET: 1110 mL    09 Jun 2019 07:01  -  09 Jun 2019 13:42  --------------------------------------------------------  IN: 200 mL / OUT: 1 mL / NET: 199 mL      BNP    RADIOLOGY & ADDITIONAL STUDIES:    < from: Xray Chest 1 View AP/PA (06.04.19 @ 13:42) >  EXAM:  XR CHEST AP OR PA 1V      PROCEDURE DATE:  06/04/2019        INTERPRETATION:  AP chest on June 4, 2019 at 1:11 PM. Patient has sepsis   and leg cellulitis.    Heart is magnified by technique. Prosthetic heart valves again noted.    Mild to moderate right base effusion is similar to June 5, 2018.    Left effusion on the earlier study is only minimally present at this time.    IMPRESSION: As above.        KASSANDRA LEAL M.D., ATTENDING RADIOLOGIST  This document has been electronically signed. Jun 4 2019  2:03PM    < end of copied text >

## 2019-06-10 LAB
-  AMIKACIN: SIGNIFICANT CHANGE UP
-  AZTREONAM: SIGNIFICANT CHANGE UP
-  CEFEPIME: SIGNIFICANT CHANGE UP
-  CEFTAZIDIME: SIGNIFICANT CHANGE UP
-  CIPROFLOXACIN: SIGNIFICANT CHANGE UP
-  GENTAMICIN: SIGNIFICANT CHANGE UP
-  IMIPENEM: SIGNIFICANT CHANGE UP
-  LEVOFLOXACIN: SIGNIFICANT CHANGE UP
-  MEROPENEM: SIGNIFICANT CHANGE UP
-  PIPERACILLIN/TAZOBACTAM: SIGNIFICANT CHANGE UP
-  TOBRAMYCIN: SIGNIFICANT CHANGE UP
CULTURE RESULTS: SIGNIFICANT CHANGE UP
CULTURE RESULTS: SIGNIFICANT CHANGE UP
METHOD TYPE: SIGNIFICANT CHANGE UP
ORGANISM # SPEC MICROSCOPIC CNT: SIGNIFICANT CHANGE UP
SPECIMEN SOURCE: SIGNIFICANT CHANGE UP
SPECIMEN SOURCE: SIGNIFICANT CHANGE UP

## 2019-06-10 PROCEDURE — 99232 SBSQ HOSP IP/OBS MODERATE 35: CPT

## 2019-06-10 RX ADMIN — CEFEPIME 100 MILLIGRAM(S): 1 INJECTION, POWDER, FOR SOLUTION INTRAMUSCULAR; INTRAVENOUS at 17:15

## 2019-06-10 RX ADMIN — ATORVASTATIN CALCIUM 80 MILLIGRAM(S): 80 TABLET, FILM COATED ORAL at 21:41

## 2019-06-10 RX ADMIN — Medication 650 MILLIGRAM(S): at 15:30

## 2019-06-10 RX ADMIN — Medication 81 MILLIGRAM(S): at 11:18

## 2019-06-10 RX ADMIN — ESCITALOPRAM OXALATE 5 MILLIGRAM(S): 10 TABLET, FILM COATED ORAL at 11:18

## 2019-06-10 RX ADMIN — PANTOPRAZOLE SODIUM 40 MILLIGRAM(S): 20 TABLET, DELAYED RELEASE ORAL at 05:34

## 2019-06-10 RX ADMIN — MIRTAZAPINE 15 MILLIGRAM(S): 45 TABLET, ORALLY DISINTEGRATING ORAL at 21:41

## 2019-06-10 RX ADMIN — Medication 650 MILLIGRAM(S): at 15:17

## 2019-06-10 RX ADMIN — Medication 12.5 MILLIGRAM(S): at 05:33

## 2019-06-10 RX ADMIN — CEFEPIME 100 MILLIGRAM(S): 1 INJECTION, POWDER, FOR SOLUTION INTRAMUSCULAR; INTRAVENOUS at 05:33

## 2019-06-10 NOTE — PROGRESS NOTE ADULT - SUBJECTIVE AND OBJECTIVE BOX
Follow up for: afib    SUBJ: no new complaints    PMH  Aortic valve disease  Non-rheumatic mitral regurgitation  HLD (hyperlipidemia)  Melanoma  Diverticulosis of small intestine without hemorrhage  Afib  Anxiety  Essential hypertension      MEDICATIONS  (STANDING):  aspirin enteric coated 81 milliGRAM(s) Oral daily  atorvastatin 80 milliGRAM(s) Oral at bedtime  cefepime   IVPB      cefepime   IVPB 2000 milliGRAM(s) IV Intermittent every 12 hours  escitalopram 5 milliGRAM(s) Oral daily  metoprolol succinate ER 12.5 milliGRAM(s) Oral daily  mirtazapine 15 milliGRAM(s) Oral at bedtime  pantoprazole    Tablet 40 milliGRAM(s) Oral before breakfast    MEDICATIONS  (PRN):  acetaminophen   Tablet .. 650 milliGRAM(s) Oral every 6 hours PRN Temp greater or equal to 38C (100.4F), Mild Pain (1 - 3)        PHYSICAL EXAM:  Vital Signs Last 24 Hrs  T(C): 36.7 (10 Dao 2019 10:36), Max: 36.9 (10 Dao 2019 05:36)  T(F): 98 (10 Dao 2019 10:36), Max: 98.4 (10 Dao 2019 05:36)  HR: 87 (10 Dao 2019 10:36) (85 - 104)  BP: 94/58 (10 Dao 2019 10:36) (94/58 - 102/63)  BP(mean): --  RR: 14 (10 Dao 2019 10:36) (14 - 16)  SpO2: 95% (10 Dao 2019 10:36) (93% - 95%)    GENERAL: NAD, well-groomed, well-developed  HEAD:  Atraumatic, Normocephalic  EYES: EOMI, PERRLA, conjunctiva and sclera clear  ENT: Moist mucous membranes,  NECK: Supple, No JVD, no bruits  CHEST/LUNG: Clear to percussion bilaterally; No rales, rhonchi, wheezing, or rubs  HEART: Regular rate and rhythm; No murmurs, rubs, or gallops PMI non displaced.  ABDOMEN: Soft, Nontender, Nondistended; Bowel sounds present  EXTREMITIES:  2+ Peripheral Pulses, No clubbing, cyanosis, or edema  SKIN: No rashes or lesions  NERVOUS SYSTEM:  Alert & Oriented X3, Good concentration; Motor Strength 5/5 B/L upper and lower extremities; DTRs 2+ intact and symmetric      TELEMETRY: atrial fibrillation, increased rates on ambulation      LABS:                        12.7   12.95 )-----------( 237      ( 09 Jun 2019 10:50 )             39.5     06-09    135  |  100  |  30<H>  ----------------------------<  131<H>  4.0   |  24  |  1.34<H>    Ca    9.5      09 Jun 2019 10:50        PT/INR - ( 09 Jun 2019 10:50 )   PT: 44.8 sec;   INR: 3.84 ratio        I&O's Summary    09 Jun 2019 07:01  -  10 Dao 2019 07:00  --------------------------------------------------------  IN: 740 mL / OUT: 1 mL / NET: 739 mL    10 Dao 2019 07:01  -  10 Dao 2019 14:34  --------------------------------------------------------  IN: 100 mL / OUT: 0 mL / NET: 100 mL        ASSESMENT AND PLAN:    Continue current management

## 2019-06-10 NOTE — PROGRESS NOTE ADULT - SUBJECTIVE AND OBJECTIVE BOX
Patient is a 89y old  Female who presents with a chief complaint of left lower extremity cellulitis (09 Jun 2019 18:59)      INTERVAL HPI/OVERNIGHT EVENTS:  no complaints      REVIEW OF SYSTEMS:  CONSTITUTIONAL: No fever, weight loss, or fatigue  EYES: No eye pain, visual disturbances, or discharge  ENMT:  No difficulty hearing, tinnitus, vertigo; No sinus or throat pain  NECK: No pain or stiffness  BREASTS: No pain, masses, or nipple discharge  RESPIRATORY: No cough, wheezing, chills or hemoptysis; No shortness of breath  CARDIOVASCULAR: No chest pain, palpitations, dizziness, or leg swelling  GASTROINTESTINAL: No abdominal or epigastric pain. No nausea, vomiting, or hematemesis; No diarrhea or constipation. No melena or hematochezia.  GENITOURINARY: No dysuria, frequency, hematuria, or incontinence  NEUROLOGICAL: No headaches, memory loss, loss of strength, numbness, or tremors  SKIN: No itching, burning, rashes, or lesions   LYMPH NODES: No enlarged glands  ENDOCRINE: No heat or cold intolerance; No hair loss  MUSCULOSKELETAL: No joint pain or swelling; No muscle, back, or extremity pain  PSYCHIATRIC: No depression, anxiety, mood swings, or difficulty sleeping  HEME/LYMPH: No easy bruising, or bleeding gums  ALLERY AND IMMUNOLOGIC: No hives or eczema  FAMILY HISTORY:  No significant family history    T(C): 36.9 (06-10-19 @ 05:36), Max: 36.9 (06-10-19 @ 05:36)  HR: 104 (06-10-19 @ 05:36) (85 - 104)  BP: 102/63 (06-10-19 @ 05:36) (95/51 - 103/56)  RR: 16 (06-10-19 @ 05:36) (15 - 16)  SpO2: 93% (06-10-19 @ 05:36) (93% - 95%)  Wt(kg): --Vital Signs Last 24 Hrs  T(C): 36.9 (10 Dao 2019 05:36), Max: 36.9 (10 Dao 2019 05:36)  T(F): 98.4 (10 Dao 2019 05:36), Max: 98.4 (10 Dao 2019 05:36)  HR: 104 (10 Dao 2019 05:36) (85 - 104)  BP: 102/63 (10 Dao 2019 05:36) (95/51 - 103/56)  BP(mean): --  RR: 16 (10 Dao 2019 05:36) (15 - 16)  SpO2: 93% (10 Dao 2019 05:36) (93% - 95%)    T(F): 98.4 (06-10-19 @ 05:36), Max: 98.4 (06-10-19 @ 05:36)  HR: 104 (06-10-19 @ 05:36) (76 - 104)  BP: 102/63 (06-10-19 @ 05:36) (91/55 - 104/69)  RR: 16 (06-10-19 @ 05:36) (15 - 16)  SpO2: 93% (06-10-19 @ 05:36) (92% - 99%)    PHYSICAL EXAM:  GENERAL: NAD, well-groomed, well-developed  HEAD:  Atraumatic, Normocephalic  EYES: EOMI, PERRLA, conjunctiva and sclera clear  ENMT: No tonsillar erythema, exudates, or enlargement; Moist mucous membranes, Good dentition, No lesions  NECK: Supple, No JVD, Normal thyroid  NERVOUS SYSTEM:  Alert & Oriented X3, Good concentration; Motor Strength 5/5 B/L upper and lower extremities; DTRs 2+ intact and symmetric  CHEST/LUNG: Clear to percussion bilaterally; No rales, rhonchi, wheezing, or rubs  HEART: Regular rate and rhythm; No murmurs, rubs, or gallops  ABDOMEN: Soft, Nontender, Nondistended; Bowel sounds present  EXTREMITIES:  2+ Peripheral Pulses, No clubbing, cyanosis, or edema  LYMPH: No lymphadenopathy noted  SKIN: No rashes or lesions    Consultant(s) Notes Reviewed:  [x ] YES  [ ] NO  Care Discussed with Consultants/Other Providers [ x] YES  [ ] NO    LABS:  06-09    135  |  100  |  30<H>  ----------------------------<  131<H>  4.0   |  24  |  1.34<H>    Ca    9.5      09 Jun 2019 10:50                            12.7   12.95 )-----------( 237      ( 09 Jun 2019 10:50 )             39.5       Culture - Blood (collected 06-08-19 @ 06:23)  Source: .Blood Blood  Gram Stain (06-09-19 @ 02:56):    Growth in aerobic bottle: Gram Negative Rods  Preliminary Report (06-09-19 @ 22:09):    Growth in aerobic bottle: Pseudomonas aeruginosa    "Due to technical problems, Proteus sp. will Not be reported as part of    the BCID panel until further notice"    ***Blood Panel PCR results on this specimen are available    approximately 3 hours after the Gram stain result.***    Gram stain, PCR, and/or culture results may not always    correspond due to difference in methodologies.    ************************************************************    This PCR assay was performed using [employer].    Thefollowing targets are tested for: Enterococcus,    vancomycin resistant enterococci, Listeria monocytogenes,    coagulase negative staphylococci, S. aureus,    methicillin resistant S. aureus, Streptococcus agalactiae    (Group B), S. pneumoniae, S. pyogenes(Group A),    Acinetobacter baumannii, Enterobacter cloacae, E. coli,    Klebsiella oxytoca, K. pneumoniae, Proteus sp.,    Serratia marcescens, Haemophilus influenzae,    Neisseria meningitidis, Pseudomonas aeruginosa, Candida    albicans, C. glabrata, C krusei, C parapsilosis,    C. tropicalis and the KPC resistance gene.  Organism: Blood Culture PCR (06-09-19 @ 04:27)  Organism: Blood Culture PCR (06-09-19 @ 04:27)      -  Pseudomonas aeruginosa: Detec      Method Type: PCR    Culture - Blood (collected 06-08-19 @ 06:23)  Source: .Blood Blood  Gram Stain (06-09-19 @ 03:59):    Growth in aerobic bottle: Gram Negative Rods  Preliminary Report (06-09-19 @ 22:44):    Growth in aerobic bottle: Pseudomonas aeruginosa    See previous culture [id number]        PT/INR - ( 09 Jun 2019 10:50 )   PT: 44.8 sec;   INR: 3.84 ratio                              12.7   12.95 )-----------( 237      ( 06-09 @ 10:50 )             39.5                11.9   10.86 )-----------( 249      ( 06-08 @ 06:23 )             37.2                11.7   9.02  )-----------( 242      ( 06-06 @ 05:05 )             35.8                11.3   9.31  )-----------( 187      ( 06-05 @ 06:55 )             34.3                13.1   10.51 )-----------( 196      ( 06-04 @ 13:15 )             39.6               RADIOLOGY & ADDITIONAL TESTS:    Imaging Personally Reviewed:  [ ] YES  [ ] NO  acetaminophen   Tablet .. 650 milliGRAM(s) Oral every 6 hours PRN  aspirin enteric coated 81 milliGRAM(s) Oral daily  atorvastatin 80 milliGRAM(s) Oral at bedtime  cefepime   IVPB      cefepime   IVPB 2000 milliGRAM(s) IV Intermittent every 12 hours  escitalopram 5 milliGRAM(s) Oral daily  metoprolol succinate ER 12.5 milliGRAM(s) Oral daily  mirtazapine 15 milliGRAM(s) Oral at bedtime  pantoprazole    Tablet 40 milliGRAM(s) Oral before breakfast      HEALTH ISSUES - PROBLEM Dx:  Bacteremia: Bacteremia  Hypokalemia: Hypokalemia  Essential hypertension: Essential hypertension  Anxiety: Anxiety  Atrial fibrillation, unspecified type: Atrial fibrillation, unspecified type  Aortic valve disease: Aortic valve disease  Skin ulcer, unspecified ulcer stage: Skin ulcer, unspecified ulcer stage  HENRY (acute kidney injury): HENRY (acute kidney injury)  Cellulitis of left leg: Cellulitis of left leg

## 2019-06-10 NOTE — PROGRESS NOTE ADULT - ASSESSMENT
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 persistence of positive cultures makes cardiac prognosis guarded. 6/9 pseudomonas ? would defer to id. await repeat blood cultures., atrial fibrillation, titrate up metoprolol for increased rates on ambulation

## 2019-06-10 NOTE — CHART NOTE - NSCHARTNOTEFT_GEN_A_CORE
Reviewed prior progress notes, labs and imaging.    Discussed with Rafaela.    Primary Diagnosis: bacteremia, suspect endocarditis    Plan:   89 yo F,  AVR, MV clip, CHF on coumadin, remote ORIF, here with leg edema and erythema, admitted with cellulitis on Vanco.  Now known to have 4 of 4 Bld Cxs growing Alpha Strep.  Pt. with chronic findings on Echocardiogram.   ID consult appreciated; pt. now + for Pseudomonas.  Pt. with possible Endocarditis extended IV antibx are recommended and a PICC line possibly.  Cardiac consult appreciated: Anticoagulate with INR 3-3.5 ( mechanical AVR and known atrial thrombi)  Pt. with HENRY on CKD, nephrology consult appreciated: Septic/hemodynamic HENRY on CKD 3 - Improving  No nephrotoxins; Renal SONO w/distended bladder; Check PVR; monitor creatinine     Bacteremia - BCx remain positive, unable to place PICC; ID follow-up     Anticipated Discharge: no anticipated discharge for now, needs to be cleared by ID.

## 2019-06-10 NOTE — PROGRESS NOTE ADULT - SUBJECTIVE AND OBJECTIVE BOX
CC: f/u for strept and pseudomonas bacteremia with left leg cellulitis and endocarditis    Patient reports she has pain in the left leg    REVIEW OF SYSTEMS:  All other review of systems negative (Comprehensive ROS)    Antimicrobials Day #  :  day 7 total  cefepime   IVPB      cefepime   IVPB 2000 milliGRAM(s) IV Intermittent every 12 hours   Day 2    Other Medications Reviewed    T(F): 97.8 (06-10-19 @ 15:50), Max: 98.4 (06-10-19 @ 05:36)  HR: 54 (06-10-19 @ 15:50)  BP: 99/65 (06-10-19 @ 15:50)  RR: 14 (06-10-19 @ 15:50)  SpO2: 83% (06-10-19 @ 15:50)  Wt(kg): --    PHYSICAL EXAM:  General: alert, no acute distress  Eyes:  anicteric, no conjunctival injection, no discharge  Oropharynx: no lesions or injection 	  Neck: supple, without adenopathy  Lungs: clear to auscultation  Heart: s1s2 2/6 sys m  Abdomen: soft, nondistended, nontender, without mass or organomegaly  Skin: no lesions  Extremities: dependent edema of the left leg with superficial ulcer on scar, no tenderness. right leg with superficial ulcer  Neurologic: alert, oriented, moves all extremities    LAB RESULTS:                        12.7   12.95 )-----------( 237      ( 09 Jun 2019 10:50 )             39.5     06-09    135  |  100  |  30<H>  ----------------------------<  131<H>  4.0   |  24  |  1.34<H>    Ca    9.5      09 Jun 2019 10:50          MICROBIOLOGY:  RECENT CULTURES:  06-08 @ 06:23 .Blood Blood Blood Culture PCR  Pseudomonas aeruginosa    Growth in aerobic bottle: Pseudomonas aeruginosa  See previous culture [id number]    Growth in aerobic bottle: Gram Negative Rods        RADIOLOGY REVIEWED:  < from: US Renal (06.06.19 @ 12:51) >  IMPRESSION:    No acute renal findings    < end of copied text >  < from: Xray Tibia + Fibula 2 Views, Left (06.04.19 @ 13:44) >  EXAM:  LEG AP&LAT LEFT      PROCEDURE DATE:  06/04/2019        INTERPRETATION:  Left tib-fib. 4 images obtained. Patient has cellulitis.    No knee effusion is evident.    There are arterial calcifications.    Pins are seen in the lower shaft of the tibia. Alignment is good and   inciting fracture is healed.    There is advanced ankle degeneration.    There is mild to moderate knee degeneration with calcified menisci.    No bone destruction or fracture is evident.    IMPRESSION: As above.      < end of copied text >        Assessment:  Patient with avr, mv clip , left ankle orif years ago admitted with red left leg, found to have strept salivarious bacteremia and now pseudomonas bacteremia. It is not clear where pseudomonas is coming from, maybe the leg, occult pulmonary or gi. Strept presumed oral source, must presume endovascular infection.   Plan:  continue cefepime  follow up latest cultures  will need 6 weeks antibiotic  ct chest abd and pelvis for source search  picc when sure negative cultures

## 2019-06-10 NOTE — CHART NOTE - NSCHARTNOTEFT_GEN_A_CORE
Nutrition Follow Up Note  Hospital Course (Per Electronic Medical Record):   Source: Medical Record [X] Patient [X]  Nursing Staff [X]     Diet: DASH/TLC , Low fiber with Ensure Enlive QD    Patient reports she never has a " Great appetite"  tolerating her diet  mary noted consuming between 50-75% of meals. Patient reports that she is consuming the Ensure supplement.  Patient had questions regarding low fiber food items , reviewed and provide written diet material    Current Weight:(6/10) 148.1/67.2kg, noted with weight change since her admission , noted on Lasix therapy , discontinued (6/4).    Pertinent Medications: MEDICATIONS  (STANDING):  aspirin enteric coated 81 milliGRAM(s) Oral daily  atorvastatin 80 milliGRAM(s) Oral at bedtime  cefepime   IVPB      cefepime   IVPB 2000 milliGRAM(s) IV Intermittent every 12 hours  escitalopram 5 milliGRAM(s) Oral daily  metoprolol succinate ER 12.5 milliGRAM(s) Oral daily  mirtazapine 15 milliGRAM(s) Oral at bedtime  pantoprazole    Tablet 40 milliGRAM(s) Oral before breakfast    MEDICATIONS  (PRN):  acetaminophen   Tablet .. 650 milliGRAM(s) Oral every 6 hours PRN Temp greater or equal to 38C (100.4F), Mild Pain (1 - 3)      Pertinent Labs:  06-09 Na135 mmol/L Glu 131 mg/dL<H> K+ 4.0 mmol/L Cr  1.34 mg/dL<H> BUN 30 mg/dL<H> 06-05 Phos 3.3 mg/dL 06-04 Alb 2.4 g/dL<L>        Skin: Stage 1 sacrum noted.    Edema: (+) 2 noted (L) leg    Last BM: on (6/9)     Estimated Needs:   [X] No Change since Previous Assessment  [X] Recalculated:     Previous Nutrition Diagnosis: Moderate Malnutrition    Nutrition Diagnosis is [X] Ongoing, encouraged po intake.         New Nutrition Diagnosis: [X] Not Applicable    Interventions:   1. Recommend continue current diet       Monitoring & Evaluation: will monitor:  [X] Weights   [X] PO Intake   [X] Follow Up (Per Protocol)  [X] Tolerance to Diet Prescription       RD to follow as per Nutrition protocol  Mary Ann Smith RDN Nutrition Follow Up Note  Hospital Course (Per Electronic Medical Record):   Source: Medical Record [X] Patient [X]  Nursing Staff [X]     Diet: DASH/TLC , Low fiber with Ensure Enlive QD    Patient reports she never has a " Great appetite"  tolerating her diet  mary noted consuming between 50-75% of meals. Patient reports that she is consuming the Ensure supplement.  Patient had questions regarding low fiber food items ,  patient with h/o diverticulosis. Provided  written diet material due to questions regarding low fiber foods.    Current Weight:(6/10) 148.1/67.2kg, noted with weight change since her admission , noted on Lasix therapy , discontinued (6/4).    Pertinent Medications: MEDICATIONS  (STANDING):  aspirin enteric coated 81 milliGRAM(s) Oral daily  atorvastatin 80 milliGRAM(s) Oral at bedtime  cefepime   IVPB      cefepime   IVPB 2000 milliGRAM(s) IV Intermittent every 12 hours  escitalopram 5 milliGRAM(s) Oral daily  metoprolol succinate ER 12.5 milliGRAM(s) Oral daily  mirtazapine 15 milliGRAM(s) Oral at bedtime  pantoprazole    Tablet 40 milliGRAM(s) Oral before breakfast    MEDICATIONS  (PRN):  acetaminophen   Tablet .. 650 milliGRAM(s) Oral every 6 hours PRN Temp greater or equal to 38C (100.4F), Mild Pain (1 - 3)      Pertinent Labs:  06-09 Na135 mmol/L Glu 131 mg/dL<H> K+ 4.0 mmol/L Cr  1.34 mg/dL<H> BUN 30 mg/dL<H> 06-05 Phos 3.3 mg/dL 06-04 Alb 2.4 g/dL<L>        Skin: Stage 1 sacrum noted.    Edema: (+) 2 noted (L) leg    Last BM: on (6/9)     Estimated Needs:   [X] No Change since Previous Assessment  [X] Recalculated:     Previous Nutrition Diagnosis: Moderate Malnutrition    Nutrition Diagnosis is [X] Ongoing, encouraged po intake.         New Nutrition Diagnosis: [X] Not Applicable    Interventions:   1. Recommend continue current diet       Monitoring & Evaluation: will monitor:  [X] Weights   [X] PO Intake   [X] Follow Up (Per Protocol)  [X] Tolerance to Diet Prescription       RD to follow as per Nutrition protocol  Mary Ann Smith RDN

## 2019-06-10 NOTE — PROGRESS NOTE ADULT - SUBJECTIVE AND OBJECTIVE BOX
Awake, weak    Vital Signs Last 24 Hrs  T(C): 36.6 (06-10-19 @ 15:50), Max: 36.9 (06-10-19 @ 05:36)  T(F): 97.8 (06-10-19 @ 15:50), Max: 98.4 (06-10-19 @ 05:36)  HR: 54 (06-10-19 @ 15:50) (54 - 104)  BP: 99/65 (06-10-19 @ 15:50) (94/58 - 102/63)  RR: 14 (06-10-19 @ 15:50) (14 - 16)  SpO2: 83% (06-10-19 @ 15:50) (83% - 95%)    Card S1S2  Lungs fair air entry b/l  Abd soft, NT, ND  Extr + edema, erythema LLE, improving                                                12.7   12.95 )-----------( 237      ( 09 Jun 2019 10:50 )             39.5     09 Jun 2019 10:50    135    |  100    |  30     ----------------------------<  131    4.0     |  24     |  1.34     Ca    9.5        09 Jun 2019 10:50    acetaminophen   Tablet .. 650 milliGRAM(s) Oral every 6 hours PRN  aspirin enteric coated 81 milliGRAM(s) Oral daily  atorvastatin 80 milliGRAM(s) Oral at bedtime  cefepime   IVPB      cefepime   IVPB 2000 milliGRAM(s) IV Intermittent every 12 hours  escitalopram 5 milliGRAM(s) Oral daily  metoprolol succinate ER 12.5 milliGRAM(s) Oral daily  mirtazapine 15 milliGRAM(s) Oral at bedtime  pantoprazole    Tablet 40 milliGRAM(s) Oral before breakfast    A/P:    Septic/hemodynamic HENRY on CKD 3  Improving  No nephrotoxins  F/u BMP   Avoid hypotension   Strep bacteremia, possibly endocarditis  Abx per ID  D/w son at bedside  Will follow

## 2019-06-11 ENCOUNTER — APPOINTMENT (OUTPATIENT)
Dept: CARDIOLOGY | Facility: CLINIC | Age: 84
End: 2019-06-11

## 2019-06-11 LAB
ANION GAP SERPL CALC-SCNC: 8 MMOL/L — SIGNIFICANT CHANGE UP (ref 5–17)
BUN SERPL-MCNC: 34 MG/DL — HIGH (ref 7–23)
CALCIUM SERPL-MCNC: 9.1 MG/DL — SIGNIFICANT CHANGE UP (ref 8.4–10.5)
CHLORIDE SERPL-SCNC: 99 MMOL/L — SIGNIFICANT CHANGE UP (ref 96–108)
CO2 SERPL-SCNC: 27 MMOL/L — SIGNIFICANT CHANGE UP (ref 22–31)
CREAT SERPL-MCNC: 1.43 MG/DL — HIGH (ref 0.5–1.3)
GLUCOSE SERPL-MCNC: 122 MG/DL — HIGH (ref 70–99)
HCT VFR BLD CALC: 34.3 % — LOW (ref 34.5–45)
HGB BLD-MCNC: 11.2 G/DL — LOW (ref 11.5–15.5)
INR BLD: 3.67 RATIO — HIGH (ref 0.88–1.16)
MCHC RBC-ENTMCNC: 31.5 PG — SIGNIFICANT CHANGE UP (ref 27–34)
MCHC RBC-ENTMCNC: 32.7 GM/DL — SIGNIFICANT CHANGE UP (ref 32–36)
MCV RBC AUTO: 96.3 FL — SIGNIFICANT CHANGE UP (ref 80–100)
NRBC # BLD: 0 /100 WBCS — SIGNIFICANT CHANGE UP (ref 0–0)
PLATELET # BLD AUTO: 292 K/UL — SIGNIFICANT CHANGE UP (ref 150–400)
POTASSIUM SERPL-MCNC: 4.1 MMOL/L — SIGNIFICANT CHANGE UP (ref 3.5–5.3)
POTASSIUM SERPL-SCNC: 4.1 MMOL/L — SIGNIFICANT CHANGE UP (ref 3.5–5.3)
PROTHROM AB SERPL-ACNC: 42.8 SEC — HIGH (ref 10–12.9)
RBC # BLD: 3.56 M/UL — LOW (ref 3.8–5.2)
RBC # FLD: 22 % — HIGH (ref 10.3–14.5)
SODIUM SERPL-SCNC: 134 MMOL/L — LOW (ref 135–145)
WBC # BLD: 10.28 K/UL — SIGNIFICANT CHANGE UP (ref 3.8–10.5)
WBC # FLD AUTO: 10.28 K/UL — SIGNIFICANT CHANGE UP (ref 3.8–10.5)

## 2019-06-11 PROCEDURE — 74176 CT ABD & PELVIS W/O CONTRAST: CPT | Mod: 26

## 2019-06-11 PROCEDURE — 71250 CT THORAX DX C-: CPT | Mod: 26

## 2019-06-11 PROCEDURE — 99232 SBSQ HOSP IP/OBS MODERATE 35: CPT

## 2019-06-11 RX ORDER — IOHEXOL 300 MG/ML
30 INJECTION, SOLUTION INTRAVENOUS ONCE
Refills: 0 | Status: COMPLETED | OUTPATIENT
Start: 2019-06-11 | End: 2019-06-11

## 2019-06-11 RX ADMIN — IOHEXOL 30 MILLILITER(S): 300 INJECTION, SOLUTION INTRAVENOUS at 12:24

## 2019-06-11 RX ADMIN — Medication 81 MILLIGRAM(S): at 11:06

## 2019-06-11 RX ADMIN — PANTOPRAZOLE SODIUM 40 MILLIGRAM(S): 20 TABLET, DELAYED RELEASE ORAL at 06:20

## 2019-06-11 RX ADMIN — ATORVASTATIN CALCIUM 80 MILLIGRAM(S): 80 TABLET, FILM COATED ORAL at 21:35

## 2019-06-11 RX ADMIN — Medication 12.5 MILLIGRAM(S): at 06:22

## 2019-06-11 RX ADMIN — CEFEPIME 100 MILLIGRAM(S): 1 INJECTION, POWDER, FOR SOLUTION INTRAMUSCULAR; INTRAVENOUS at 17:43

## 2019-06-11 RX ADMIN — CEFEPIME 100 MILLIGRAM(S): 1 INJECTION, POWDER, FOR SOLUTION INTRAMUSCULAR; INTRAVENOUS at 06:21

## 2019-06-11 RX ADMIN — MIRTAZAPINE 15 MILLIGRAM(S): 45 TABLET, ORALLY DISINTEGRATING ORAL at 21:35

## 2019-06-11 RX ADMIN — ESCITALOPRAM OXALATE 5 MILLIGRAM(S): 10 TABLET, FILM COATED ORAL at 11:06

## 2019-06-11 NOTE — PROGRESS NOTE ADULT - SUBJECTIVE AND OBJECTIVE BOX
Awake, weak    Vital Signs Last 24 Hrs  T(C): 36.4 (06-11-19 @ 15:48), Max: 36.7 (06-10-19 @ 19:52)  T(F): 97.6 (06-11-19 @ 15:48), Max: 98.1 (06-10-19 @ 19:52)  HR: 91 (06-11-19 @ 15:48) (61 - 97)  BP: 93/58 (06-11-19 @ 15:48) (93/58 - 107/59)  RR: 16 (06-11-19 @ 15:48) (16 - 16)  SpO2: 95% (06-11-19 @ 10:22) (94% - 95%)    Card S1S2  Lungs fair air entry b/l  Abd soft, NT, ND  Extr + edema, erythema LLE, improving                                                         11.2   10.28 )-----------( 292      ( 11 Jun 2019 13:35 )             34.3     11 Jun 2019 13:35    134    |  99     |  34     ----------------------------<  122    4.1     |  27     |  1.43     Ca    9.1        11 Jun 2019 13:35    PT/INR - ( 11 Jun 2019 09:55 )   PT: 42.8 sec;   INR: 3.67 ratio      acetaminophen   Tablet .. 650 milliGRAM(s) Oral every 6 hours PRN  aspirin enteric coated 81 milliGRAM(s) Oral daily  atorvastatin 80 milliGRAM(s) Oral at bedtime  cefepime   IVPB      cefepime   IVPB 2000 milliGRAM(s) IV Intermittent every 12 hours  escitalopram 5 milliGRAM(s) Oral daily  metoprolol succinate ER 12.5 milliGRAM(s) Oral daily  mirtazapine 15 milliGRAM(s) Oral at bedtime  pantoprazole    Tablet 40 milliGRAM(s) Oral before breakfast    A/P:    Septic/hemodynamic HENRY on CKD 3  No nephrotoxins  F/u BMP   Avoid hypotension   Strep bacteremia, possibly endocarditis  Abx per ID  Will follow

## 2019-06-11 NOTE — PROGRESS NOTE ADULT - SUBJECTIVE AND OBJECTIVE BOX
Patient is a 89y old  Female who presents with a chief complaint of left lower extremity cellulitis (10 Dao 2019 19:53)      INTERVAL HPI/OVERNIGHT EVENTS: Alert and inquisitive      REVIEW OF SYSTEMS:  CONSTITUTIONAL: No fever, weight loss, or fatigue  EYES: No eye pain, visual disturbances, or discharge  ENMT:  No difficulty hearing, tinnitus, vertigo; No sinus or throat pain  NECK: No pain or stiffness  BREASTS: No pain, masses, or nipple discharge  RESPIRATORY: No cough, wheezing, chills or hemoptysis; No shortness of breath  CARDIOVASCULAR: No chest pain, palpitations, dizziness, or leg swelling  GASTROINTESTINAL: No abdominal or epigastric pain. No nausea, vomiting, or hematemesis; No diarrhea or constipation. No melena or hematochezia.  GENITOURINARY: No dysuria, frequency, hematuria, or incontinence  NEUROLOGICAL: No headaches, memory loss, loss of strength, numbness, or tremors  SKIN: No itching, burning, rashes, or lesions   LYMPH NODES: No enlarged glands  ENDOCRINE: No heat or cold intolerance; No hair loss  MUSCULOSKELETAL: No joint pain or swelling; No muscle, back, or extremity pain  PSYCHIATRIC: No depression, anxiety, mood swings, or difficulty sleeping  HEME/LYMPH: No easy bruising, or bleeding gums  ALLERY AND IMMUNOLOGIC: No hives or eczema  FAMILY HISTORY:  No significant family history    T(C): 36.6 (06-11-19 @ 06:17), Max: 36.7 (06-10-19 @ 10:36)  HR: 93 (06-11-19 @ 06:17) (54 - 93)  BP: 106/62 (06-11-19 @ 06:17) (94/58 - 106/62)  RR: 16 (06-11-19 @ 06:17) (14 - 16)  SpO2: 95% (06-11-19 @ 06:17) (83% - 95%)  Wt(kg): --Vital Signs Last 24 Hrs  T(C): 36.6 (11 Jun 2019 06:17), Max: 36.7 (10 Dao 2019 10:36)  T(F): 97.8 (11 Jun 2019 06:17), Max: 98.1 (10 Dao 2019 19:52)  HR: 93 (11 Jun 2019 06:17) (54 - 93)  BP: 106/62 (11 Jun 2019 06:17) (94/58 - 106/62)  BP(mean): --  RR: 16 (11 Jun 2019 06:17) (14 - 16)  SpO2: 95% (11 Jun 2019 06:17) (83% - 95%)    T(F): 97.8 (06-11-19 @ 06:17), Max: 98.4 (06-10-19 @ 05:36)  HR: 93 (06-11-19 @ 06:17) (54 - 104)  BP: 106/62 (06-11-19 @ 06:17) (92/48 - 106/62)  RR: 16 (06-11-19 @ 06:17) (14 - 16)  SpO2: 95% (06-11-19 @ 06:17) (83% - 99%)    PHYSICAL EXAM:  GENERAL: NAD, well-groomed, well-developed  HEAD:  Atraumatic, Normocephalic  EYES: EOMI, PERRLA, conjunctiva and sclera clear  ENMT: No tonsillar erythema, exudates, or enlargement; Moist mucous membranes, Good dentition, No lesions  NECK: Supple, No JVD, Normal thyroid  NERVOUS SYSTEM:  Alert & Oriented X3, Good concentration; Motor Strength 5/5 B/L upper and lower extremities; DTRs 2+ intact and symmetric  CHEST/LUNG: Clear to percussion bilaterally; No rales, rhonchi, wheezing, or rubs  HEART: Regular rate and rhythm; No murmurs, rubs, or gallops  ABDOMEN: Soft, Nontender, Nondistended; Bowel sounds present  EXTREMITIES:  2+ Peripheral Pulses, No clubbing, cyanosis, or edema  LYMPH: No lymphadenopathy noted  SKIN: No rashes or lesions    Consultant(s) Notes Reviewed:  [x ] YES  [ ] NO  Care Discussed with Consultants/Other Providers [ x] YES  [ ] NO    LABS:  06-09    135  |  100  |  30<H>  ----------------------------<  131<H>  4.0   |  24  |  1.34<H>    Ca    9.5      09 Jun 2019 10:50                            12.7   12.95 )-----------( 237      ( 09 Jun 2019 10:50 )             39.5       Culture - Blood (collected 06-09-19 @ 20:35)  Source: .Blood Blood  Preliminary Report (06-11-19 @ 01:01):    No growth to date.    Culture - Blood (collected 06-09-19 @ 20:35)  Source: .Blood Blood  Preliminary Report (06-11-19 @ 01:01):    No growth to date.        PT/INR - ( 09 Jun 2019 10:50 )   PT: 44.8 sec;   INR: 3.84 ratio                              12.7   12.95 )-----------( 237      ( 06-09 @ 10:50 )             39.5                11.9   10.86 )-----------( 249      ( 06-08 @ 06:23 )             37.2                11.7   9.02  )-----------( 242      ( 06-06 @ 05:05 )             35.8                11.3   9.31  )-----------( 187      ( 06-05 @ 06:55 )             34.3                13.1   10.51 )-----------( 196      ( 06-04 @ 13:15 )             39.6               RADIOLOGY & ADDITIONAL TESTS:    Imaging Personally Reviewed:  [ ] YES  [ ] NO  acetaminophen   Tablet .. 650 milliGRAM(s) Oral every 6 hours PRN  aspirin enteric coated 81 milliGRAM(s) Oral daily  atorvastatin 80 milliGRAM(s) Oral at bedtime  cefepime   IVPB      cefepime   IVPB 2000 milliGRAM(s) IV Intermittent every 12 hours  escitalopram 5 milliGRAM(s) Oral daily  metoprolol succinate ER 12.5 milliGRAM(s) Oral daily  mirtazapine 15 milliGRAM(s) Oral at bedtime  pantoprazole    Tablet 40 milliGRAM(s) Oral before breakfast      HEALTH ISSUES - PROBLEM Dx:  Bacteremia: Bacteremia  Hypokalemia: Hypokalemia  Essential hypertension: Essential hypertension  Anxiety: Anxiety  Atrial fibrillation, unspecified type: Atrial fibrillation, unspecified type  Aortic valve disease: Aortic valve disease  Skin ulcer, unspecified ulcer stage: Skin ulcer, unspecified ulcer stage  HENRY (acute kidney injury): HENRY (acute kidney injury)  Cellulitis of left leg: Cellulitis of left leg

## 2019-06-11 NOTE — PROGRESS NOTE ADULT - ASSESSMENT
Infectious disease note seen and appreciated awaiting CT of the abdomen and pelvis looking for possible source of the Pseudomonas infection patient will require prolonged antibiotics intravenously but first must clear the blood stream before placement of a PICC line

## 2019-06-11 NOTE — PROGRESS NOTE ADULT - SUBJECTIVE AND OBJECTIVE BOX
CC: f/u for    Patient reports    REVIEW OF SYSTEMS:  All other review of systems negative (Comprehensive ROS)    Antimicrobials Day #  :  cefepime   IVPB      cefepime   IVPB 2000 milliGRAM(s) IV Intermittent every 12 hours    Other Medications Reviewed    T(F): 97.6 (06-11-19 @ 15:48), Max: 98.1 (06-10-19 @ 19:52)  HR: 91 (06-11-19 @ 15:48)  BP: 93/58 (06-11-19 @ 15:48)  RR: 16 (06-11-19 @ 15:48)  SpO2: 95% (06-11-19 @ 10:22)  Wt(kg): --    PHYSICAL EXAM:  General: alert, no acute distress  Eyes:  anicteric, no conjunctival injection, no discharge  Oropharynx: no lesions or injection 	  Neck: supple, without adenopathy  Lungs: clear to auscultation  Heart: regular rate and rhythm; no murmur, rubs or gallops  Abdomen: soft, nondistended, nontender, without mass or organomegaly  Skin: no lesions  Extremities: no clubbing, cyanosis, or edema  Neurologic: alert, oriented, moves all extremities    LAB RESULTS:                        11.2   10.28 )-----------( 292      ( 11 Jun 2019 13:35 )             34.3     06-11    134<L>  |  99  |  34<H>  ----------------------------<  122<H>  4.1   |  27  |  1.43<H>    Ca    9.1      11 Jun 2019 13:35          MICROBIOLOGY:  RECENT CULTURES:  06-09 @ 20:35 .Blood Blood     No growth to date.      06-08 @ 06:23 .Blood Blood Blood Culture PCR  Pseudomonas aeruginosa    Growth in aerobic bottle: Pseudomonas aeruginosa  See previous culture [id number]    Growth in aerobic bottle: Gram Negative Rods    Culture - Blood (06.04.19 @ 13:15)    -  Streptococcus sp. (Not Grp A, B or S pneumoniae): Detec    Gram Stain:   Growth in aerobic bottle: Gram Positive Cocci in Pairs and Chains  Growth in anaerobic bottle: Gram Positive Cocci in Pairs and Chains    Specimen Source: .Blood Blood-Peripheral    Organism: Blood Culture PCR    Culture Results:   Growth in aerobic and anaerobic bottles: Streptococcus salivarius  vestibularis group  "Susceptibilities not performed"  "Due to technical problems, Proteus sp. will Not be reported as part of  the BCID panel until further notice"  ***Blood Panel PCR results on this specimen are available  approximately 3 hours after the Gram stain result.***  Gram stain, PCR, and/or culture results may not always  correspond due to difference in methodologies.  ***********************************************************      RADIOLOGY REVIEWED:      < from: CT Chest No Cont (06.11.19 @ 15:49) >  IMPRESSION:     No pneumonia.    Pulmonary edema with moderate right and small left pleural effusion.    3.0 cm hematoma in the right pectoralis muscle with hemorrhagic fluid   level.    No evidence of intra-abdominal or pelvic sepsis.    Stable masslike thickening of the sigmoid colon. Differential includes   neoplasm, colitis, or chronic diverticular disease. Colonoscopy with   biopsy is needed for a definitive diagnosis.        < end of copied text >    Assessment:  Patient with avr, mv clip, came in with strept salivarius and pseudomonas bacteremia. She came in for red swollen left leg, but these two organisms are not the usual cellultiis pathogens. Her leg seemed to improve even before antipseudomonas tx was commenced. CT with a hematoma in chest wall but doubt it is infected. Colon abnormality could pose risk for translocation but I suspect surgical intervention would be poorly tolerated and she seems to be doing ok for now  Plan:  would opt to treat with cefepime for total 6 weeks and then get follow up blood culture  place picc if f/u cultures remain negative.   elevate left leg

## 2019-06-12 DIAGNOSIS — K63.9 DISEASE OF INTESTINE, UNSPECIFIED: ICD-10-CM

## 2019-06-12 LAB
% ALBUMIN: 43.3 % — SIGNIFICANT CHANGE UP
% ALPHA 1: 9.5 % — SIGNIFICANT CHANGE UP
% ALPHA 2: 8.7 % — SIGNIFICANT CHANGE UP
% BETA: 14.1 % — SIGNIFICANT CHANGE UP
% GAMMA: 24.4 % — SIGNIFICANT CHANGE UP
% M SPIKE: SIGNIFICANT CHANGE UP
ALBUMIN SERPL ELPH-MCNC: 2.5 G/DL — LOW (ref 3.6–5.5)
ALBUMIN/GLOB SERPL ELPH: 0.8 RATIO — SIGNIFICANT CHANGE UP
ALPHA1 GLOB SERPL ELPH-MCNC: 0.5 G/DL — HIGH (ref 0.1–0.4)
ALPHA2 GLOB SERPL ELPH-MCNC: 0.5 G/DL — SIGNIFICANT CHANGE UP (ref 0.5–1)
ANION GAP SERPL CALC-SCNC: 6 MMOL/L — SIGNIFICANT CHANGE UP (ref 5–17)
B-GLOBULIN SERPL ELPH-MCNC: 0.8 G/DL — SIGNIFICANT CHANGE UP (ref 0.5–1)
BUN SERPL-MCNC: 34 MG/DL — HIGH (ref 7–23)
CALCIUM SERPL-MCNC: 9.3 MG/DL — SIGNIFICANT CHANGE UP (ref 8.4–10.5)
CHLORIDE SERPL-SCNC: 103 MMOL/L — SIGNIFICANT CHANGE UP (ref 96–108)
CO2 SERPL-SCNC: 29 MMOL/L — SIGNIFICANT CHANGE UP (ref 22–31)
CREAT SERPL-MCNC: 1.5 MG/DL — HIGH (ref 0.5–1.3)
GAMMA GLOBULIN: 1.4 G/DL — SIGNIFICANT CHANGE UP (ref 0.6–1.6)
GLUCOSE SERPL-MCNC: 112 MG/DL — HIGH (ref 70–99)
HCT VFR BLD CALC: 32.4 % — LOW (ref 34.5–45)
HGB BLD-MCNC: 10.5 G/DL — LOW (ref 11.5–15.5)
INR BLD: 3.42 RATIO — HIGH (ref 0.88–1.16)
INTERPRETATION SERPL IFE-IMP: SIGNIFICANT CHANGE UP
M-SPIKE: SIGNIFICANT CHANGE UP (ref 0–0)
MCHC RBC-ENTMCNC: 30.8 PG — SIGNIFICANT CHANGE UP (ref 27–34)
MCHC RBC-ENTMCNC: 32.4 GM/DL — SIGNIFICANT CHANGE UP (ref 32–36)
MCV RBC AUTO: 95 FL — SIGNIFICANT CHANGE UP (ref 80–100)
NRBC # BLD: 0 /100 WBCS — SIGNIFICANT CHANGE UP (ref 0–0)
PLATELET # BLD AUTO: 275 K/UL — SIGNIFICANT CHANGE UP (ref 150–400)
POTASSIUM SERPL-MCNC: 4.7 MMOL/L — SIGNIFICANT CHANGE UP (ref 3.5–5.3)
POTASSIUM SERPL-SCNC: 4.7 MMOL/L — SIGNIFICANT CHANGE UP (ref 3.5–5.3)
PROT PATTERN SERPL ELPH-IMP: SIGNIFICANT CHANGE UP
PROTHROM AB SERPL-ACNC: 39.8 SEC — HIGH (ref 10–12.9)
RBC # BLD: 3.41 M/UL — LOW (ref 3.8–5.2)
RBC # FLD: 21.9 % — HIGH (ref 10.3–14.5)
SODIUM SERPL-SCNC: 138 MMOL/L — SIGNIFICANT CHANGE UP (ref 135–145)
WBC # BLD: 8.86 K/UL — SIGNIFICANT CHANGE UP (ref 3.8–10.5)
WBC # FLD AUTO: 8.86 K/UL — SIGNIFICANT CHANGE UP (ref 3.8–10.5)

## 2019-06-12 PROCEDURE — 99233 SBSQ HOSP IP/OBS HIGH 50: CPT

## 2019-06-12 PROCEDURE — 99232 SBSQ HOSP IP/OBS MODERATE 35: CPT

## 2019-06-12 RX ORDER — DIGOXIN 250 MCG
0.12 TABLET ORAL DAILY
Refills: 0 | Status: DISCONTINUED | OUTPATIENT
Start: 2019-06-12 | End: 2019-06-20

## 2019-06-12 RX ORDER — WARFARIN SODIUM 2.5 MG/1
2 TABLET ORAL ONCE
Refills: 0 | Status: COMPLETED | OUTPATIENT
Start: 2019-06-12 | End: 2019-06-12

## 2019-06-12 RX ADMIN — ESCITALOPRAM OXALATE 5 MILLIGRAM(S): 10 TABLET, FILM COATED ORAL at 11:59

## 2019-06-12 RX ADMIN — ATORVASTATIN CALCIUM 80 MILLIGRAM(S): 80 TABLET, FILM COATED ORAL at 21:38

## 2019-06-12 RX ADMIN — Medication 81 MILLIGRAM(S): at 12:00

## 2019-06-12 RX ADMIN — CEFEPIME 100 MILLIGRAM(S): 1 INJECTION, POWDER, FOR SOLUTION INTRAMUSCULAR; INTRAVENOUS at 17:54

## 2019-06-12 RX ADMIN — CEFEPIME 100 MILLIGRAM(S): 1 INJECTION, POWDER, FOR SOLUTION INTRAMUSCULAR; INTRAVENOUS at 05:11

## 2019-06-12 RX ADMIN — WARFARIN SODIUM 2 MILLIGRAM(S): 2.5 TABLET ORAL at 21:38

## 2019-06-12 RX ADMIN — PANTOPRAZOLE SODIUM 40 MILLIGRAM(S): 20 TABLET, DELAYED RELEASE ORAL at 05:11

## 2019-06-12 RX ADMIN — MIRTAZAPINE 15 MILLIGRAM(S): 45 TABLET, ORALLY DISINTEGRATING ORAL at 21:38

## 2019-06-12 RX ADMIN — Medication 0.12 MILLIGRAM(S): at 15:01

## 2019-06-12 NOTE — PROGRESS NOTE ADULT - PROBLEM SELECTOR PLAN 3
conservative management
Continue to follow and supplement as necessary
Lab Is unable to do venipunctures to obtain an INR recommend fingerstick INR

## 2019-06-12 NOTE — PROGRESS NOTE ADULT - ASSESSMENT
Recent blood cultures no growth so far we'll await infectious disease approval for PICC line placement CT scans of the chest abdomen and pelvis were reviewed sigmoid abnormality is chronic decision has been to not be aggressive with that due to her age and overall health there is a possibility that this could represent chronic changes from diverticulitis versus colonic neoplasm

## 2019-06-12 NOTE — PROGRESS NOTE ADULT - SUBJECTIVE AND OBJECTIVE BOX
SUBJ:  Patient is a 89y old  Female who presents with a chief complaint of left lower extremity cellulitis (12 Jun 2019 10:12)  patient seen and examined  chart reviewed  she is comfortable... 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 650 milliGRAM(s) Oral every 6 hours PRN Temp greater or equal to 38C (100.4F), Mild Pain (1 - 3)          Vital Signs Last 24 Hrs  T(C): 36.4 (12 Jun 2019 09:51), Max: 36.7 (12 Jun 2019 03:00)  T(F): 97.6 (12 Jun 2019 09:51), Max: 98 (12 Jun 2019 03:00)  HR: 88 (12 Jun 2019 09:51) (88 - 95)  BP: 91/48 (12 Jun 2019 09:51) (91/48 - 110/64)  BP(mean): --  RR: 16 (12 Jun 2019 09:51) (16 - 18)  SpO2: 96% (12 Jun 2019 09:51) (96% - 100%)    REVIEW OF SYSTEMS:  CONSTITUTIONAL: fatigue  RESPIRATORY: No cough, wheezing, chills or hemoptysis; No shortness of breath  CARDIOVASCULAR: No chest pain or chest pressure.  No shortness of breath or dyspnea on exertion.  No palpitations, dizziness, light headedness, syncope or near syncope.  No edema, no orthopnea.   NEUROLOGICAL: No headaches, memory loss, loss of strength, numbness, or tremors      PHYSICAL EXAM  Constitutional:  elderly woman WDWN. No acute distress  HEENT: normocephalic, atraumatic.  PERRLA. EOMI  Neck : No JVD. no carotid bruits  Lungs:  clear to auscultation bilaterally. no rhonchi. no wheezing  Heart:  S1 and S2. No S3, S4. II/VI systolic murmur.  Abdomen:  soft, non tender.  Extremities: No clubbing, cyanoisis or edema  Nuerologic:  A+O x 3. No focal deficits  Skin:  no rashes        LABS:                        10.5   8.86  )-----------( 275      ( 12 Jun 2019 07:14 )             32.4     06-12    138  |  103  |  34<H>  ----------------------------<  112<H>  4.7   |  29  |  1.50<H>    Ca    9.3      12 Jun 2019 07:14              acetaminophen   Tablet .. 650 milliGRAM(s) Oral every 6 hours PRN  aspirin enteric coated 81 milliGRAM(s) Oral daily  atorvastatin 80 milliGRAM(s) Oral at bedtime  cefepime   IVPB      cefepime   IVPB 2000 milliGRAM(s) IV Intermittent every 12 hours  escitalopram 5 milliGRAM(s) Oral daily  mirtazapine 15 milliGRAM(s) Oral at bedtime  pantoprazole    Tablet 40 milliGRAM(s) Oral before breakfast    I&O's Summary    11 Jun 2019 07:01  -  12 Jun 2019 07:00  --------------------------------------------------------  IN: 600 mL / OUT: 0 mL / NET: 600 mL    12 Jun 2019 07:01  -  12 Jun 2019 10:24  --------------------------------------------------------  IN: 300 mL / OUT: 0 mL / NET: 300 mL      tele  AF, paroxysms of rapid rates    < from: TTE Echo Complete w/Doppler (06.06.19 @ 11:39) >  1. Left ventricular ejection fraction, by visual estimation, is 60%.   2. Normal global left ventricular systolic function.   3. Spectral Doppler shows restrictive pattern of left ventricular   myocardial filling (Grade III diastolic dysfunction).   4. D systolic flattening consistent with severe pulmonary hypertenisn.   5. Severely enlarged right ventricle.   6. Severe tricuspid regurgitation.   7. Aortic valve is normally functioning mechanical prosthetic valve.   8. Mechanical prosthesis in the aortic valve position.   9. Moderate pulmonic valve regurgitation.  10. Estimated pulmonary artery systolic pressure is 135.6 mmHg assuming a   right atrial pressure of 15 mmHg, which is consistent with severe   pulmonary hypertension.  11. Severe pulmonary hypertension is present.  12. Moderate interatrial left to right shunt consider a PFO a patent   formaen ovale.  13. Bernoulli equation may be inaccurate in the setting of severe TR.   elevated qp/qs ratio >2 with right heart enlargement suggests a signicant   intracardiac shunt.  14. Biatrial enlargement suggests restrictive physiology.  15. Aortic doppler consistent with a mature aortic vavle prosthesis.    < end of copied text >

## 2019-06-12 NOTE — PROGRESS NOTE ADULT - SUBJECTIVE AND OBJECTIVE BOX
Patient is a 89y old  Female who presents with a chief complaint of left lower extremity cellulitis (11 Jun 2019 18:44)      INTERVAL HPI/OVERNIGHT EVENTS:Alert without complaints      REVIEW OF SYSTEMS:  CONSTITUTIONAL: No fever, weight loss, or fatigue  EYES: No eye pain, visual disturbances, or discharge  ENMT:  No difficulty hearing, tinnitus, vertigo; No sinus or throat pain  NECK: No pain or stiffness  BREASTS: No pain, masses, or nipple discharge  RESPIRATORY: No cough, wheezing, chills or hemoptysis; No shortness of breath  CARDIOVASCULAR: No chest pain, palpitations, dizziness, or leg swelling  GASTROINTESTINAL: No abdominal or epigastric pain. No nausea, vomiting, or hematemesis; No diarrhea or constipation. No melena or hematochezia.  GENITOURINARY: No dysuria, frequency, hematuria, or incontinence  NEUROLOGICAL: No headaches, memory loss, loss of strength, numbness, or tremors  SKIN: No itching, burning, rashes, or lesions   LYMPH NODES: No enlarged glands  ENDOCRINE: No heat or cold intolerance; No hair loss  MUSCULOSKELETAL: No joint pain or swelling; No muscle, back, or extremity pain  PSYCHIATRIC: No depression, anxiety, mood swings, or difficulty sleeping  HEME/LYMPH: No easy bruising, or bleeding gums  ALLERY AND IMMUNOLOGIC: No hives or eczema  FAMILY HISTORY:  No significant family history    T(C): 36.4 (06-12-19 @ 09:51), Max: 36.7 (06-12-19 @ 03:00)  HR: 88 (06-12-19 @ 09:51) (88 - 97)  BP: 91/48 (06-12-19 @ 09:51) (91/48 - 110/64)  RR: 16 (06-12-19 @ 09:51) (16 - 18)  SpO2: 96% (06-12-19 @ 09:51) (95% - 100%)  Wt(kg): --Vital Signs Last 24 Hrs  T(C): 36.4 (12 Jun 2019 09:51), Max: 36.7 (12 Jun 2019 03:00)  T(F): 97.6 (12 Jun 2019 09:51), Max: 98 (12 Jun 2019 03:00)  HR: 88 (12 Jun 2019 09:51) (88 - 97)  BP: 91/48 (12 Jun 2019 09:51) (91/48 - 110/64)  BP(mean): --  RR: 16 (12 Jun 2019 09:51) (16 - 18)  SpO2: 96% (12 Jun 2019 09:51) (95% - 100%)    T(F): 97.6 (06-12-19 @ 09:51), Max: 98.4 (06-10-19 @ 05:36)  HR: 88 (06-12-19 @ 09:51) (54 - 104)  BP: 91/48 (06-12-19 @ 09:51) (91/48 - 110/64)  RR: 16 (06-12-19 @ 09:51) (14 - 18)  SpO2: 96% (06-12-19 @ 09:51) (83% - 100%)    PHYSICAL EXAM:  GENERAL: NAD, well-groomed, well-developed  HEAD:  Atraumatic, Normocephalic  EYES: EOMI, PERRLA, conjunctiva and sclera clear  ENMT: No tonsillar erythema, exudates, or enlargement; Moist mucous membranes, Good dentition, No lesions  NECK: Supple, No JVD, Normal thyroid  NERVOUS SYSTEM:  Alert & Oriented X3, Good concentration; Motor Strength 5/5 B/L upper and lower extremities; DTRs 2+ intact and symmetric  CHEST/LUNG: Clear to percussion bilaterally; No rales, rhonchi, wheezing, or rubs  HEART: Regular rate and rhythm; No murmurs, rubs, or gallops  ABDOMEN: Soft, Nontender, Nondistended; Bowel sounds present  EXTREMITIES:  2+ Peripheral Pulses, No clubbing, cyanosis, or edema  LYMPH: No lymphadenopathy noted  SKIN: No rashes or lesions    Consultant(s) Notes Reviewed:  [x ] YES  [ ] NO  Care Discussed with Consultants/Other Providers [ x] YES  [ ] NO    LABS:  06-12    138  |  103  |  34<H>  ----------------------------<  112<H>  4.7   |  29  |  1.50<H>    Ca    9.3      12 Jun 2019 07:14                            10.5   8.86  )-----------( 275      ( 12 Jun 2019 07:14 )             32.4       Culture - Blood (collected 06-09-19 @ 20:35)  Source: .Blood Blood  Preliminary Report (06-11-19 @ 01:01):    No growth to date.    Culture - Blood (collected 06-09-19 @ 20:35)  Source: .Blood Blood  Preliminary Report (06-11-19 @ 01:01):    No growth to date.        PT/INR - ( 12 Jun 2019 07:14 )   PT: 39.8 sec;   INR: 3.42 ratio                              10.5   8.86  )-----------( 275      ( 06-12 @ 07:14 )             32.4                11.2   10.28 )-----------( 292      ( 06-11 @ 13:35 )             34.3                12.7   12.95 )-----------( 237      ( 06-09 @ 10:50 )             39.5                11.9   10.86 )-----------( 249      ( 06-08 @ 06:23 )             37.2                11.7   9.02  )-----------( 242      ( 06-06 @ 05:05 )             35.8                11.3   9.31  )-----------( 187      ( 06-05 @ 06:55 )             34.3                13.1   10.51 )-----------( 196      ( 06-04 @ 13:15 )             39.6               RADIOLOGY & ADDITIONAL TESTS:    Imaging Personally Reviewed:  [ ] YES  [ ] NO  acetaminophen   Tablet .. 650 milliGRAM(s) Oral every 6 hours PRN  aspirin enteric coated 81 milliGRAM(s) Oral daily  atorvastatin 80 milliGRAM(s) Oral at bedtime  cefepime   IVPB      cefepime   IVPB 2000 milliGRAM(s) IV Intermittent every 12 hours  escitalopram 5 milliGRAM(s) Oral daily  mirtazapine 15 milliGRAM(s) Oral at bedtime  pantoprazole    Tablet 40 milliGRAM(s) Oral before breakfast      HEALTH ISSUES - PROBLEM Dx:  Bacteremia: Bacteremia  Hypokalemia: Hypokalemia  Essential hypertension: Essential hypertension  Anxiety: Anxiety  Atrial fibrillation, unspecified type: Atrial fibrillation, unspecified type  Aortic valve disease: Aortic valve disease  Skin ulcer, unspecified ulcer stage: Skin ulcer, unspecified ulcer stage  HENRY (acute kidney injury): HENRY (acute kidney injury)  Cellulitis of left leg: Cellulitis of left leg

## 2019-06-12 NOTE — PROGRESS NOTE ADULT - ASSESSMENT
89 year old woman with multiple medical problems.  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  AF, s/p remote mechanical AVR and MitraClip.  Chronic renal insuffiencey chronic anticoagulation, borderline low BP  Rapid AF with ambulation... metoprolol was discontinued due to low BP    Plan  - suggest cautiously resuming digoxin  - follow electrolytes and avoid hypokalemia  - continue anticoagulation target INR 2.5-3.5  - antibiotics as per ID    discussed with patient and primary MD

## 2019-06-12 NOTE — CHART NOTE - NSCHARTNOTEFT_GEN_A_CORE
Reviewed prior progress notes, labs and imaging.    Discussed with Rafaela.    Primary Diagnosis: bacteremia, suspect endocarditis    Plan:   89 yo F,  AVR, MV clip, CHF on coumadin, remote ORIF, here with leg edema and erythema, admitted with cellulitis on Vanco.  Now known to have 4 of 4 Bld Cxs growing Alpha Strep.  Pt. with chronic findings on Echocardiogram.   ID consult appreciated; pt. now + for Pseudomonas.  Pt. with possible Endocarditis extended IV antibx are recommended.  Cardiac consult appreciated: Anticoagulate with INR 3-3.5 ( mechanical AVR and known atrial thrombi)  Pt. with HENRY on CKD, nephrology consult appreciated: Septic/hemodynamic HENRY on CKD 3 - Improving  No nephrotoxins; Renal SONO w/distended bladder;  monitor creatinine     Bacteremia - BCx remain positive, unable to place PICC; ID follow-up     Anticipated Discharge: no anticipated discharge for now, needs to be cleared by ID, have neg. BC and have PICC line placed.

## 2019-06-13 ENCOUNTER — APPOINTMENT (OUTPATIENT)
Dept: CARDIOLOGY | Facility: CLINIC | Age: 84
End: 2019-06-13

## 2019-06-13 LAB
ANION GAP SERPL CALC-SCNC: 6 MMOL/L — SIGNIFICANT CHANGE UP (ref 5–17)
BUN SERPL-MCNC: 36 MG/DL — HIGH (ref 7–23)
CALCIUM SERPL-MCNC: 9.4 MG/DL — SIGNIFICANT CHANGE UP (ref 8.4–10.5)
CHLORIDE SERPL-SCNC: 103 MMOL/L — SIGNIFICANT CHANGE UP (ref 96–108)
CO2 SERPL-SCNC: 30 MMOL/L — SIGNIFICANT CHANGE UP (ref 22–31)
CREAT SERPL-MCNC: 1.56 MG/DL — HIGH (ref 0.5–1.3)
GLUCOSE SERPL-MCNC: 97 MG/DL — SIGNIFICANT CHANGE UP (ref 70–99)
HCT VFR BLD CALC: 33.1 % — LOW (ref 34.5–45)
HGB BLD-MCNC: 10.4 G/DL — LOW (ref 11.5–15.5)
INR BLD: 2.87 RATIO — HIGH (ref 0.88–1.16)
MCHC RBC-ENTMCNC: 30.4 PG — SIGNIFICANT CHANGE UP (ref 27–34)
MCHC RBC-ENTMCNC: 31.4 GM/DL — LOW (ref 32–36)
MCV RBC AUTO: 96.8 FL — SIGNIFICANT CHANGE UP (ref 80–100)
NRBC # BLD: 0 /100 WBCS — SIGNIFICANT CHANGE UP (ref 0–0)
PLATELET # BLD AUTO: 279 K/UL — SIGNIFICANT CHANGE UP (ref 150–400)
POTASSIUM SERPL-MCNC: 4.6 MMOL/L — SIGNIFICANT CHANGE UP (ref 3.5–5.3)
POTASSIUM SERPL-SCNC: 4.6 MMOL/L — SIGNIFICANT CHANGE UP (ref 3.5–5.3)
PROTHROM AB SERPL-ACNC: 33.2 SEC — HIGH (ref 10–12.9)
RBC # BLD: 3.42 M/UL — LOW (ref 3.8–5.2)
RBC # FLD: 22 % — HIGH (ref 10.3–14.5)
SODIUM SERPL-SCNC: 139 MMOL/L — SIGNIFICANT CHANGE UP (ref 135–145)
WBC # BLD: 10.58 K/UL — HIGH (ref 3.8–10.5)
WBC # FLD AUTO: 10.58 K/UL — HIGH (ref 3.8–10.5)

## 2019-06-13 PROCEDURE — 99232 SBSQ HOSP IP/OBS MODERATE 35: CPT

## 2019-06-13 RX ORDER — ENOXAPARIN SODIUM 100 MG/ML
60 INJECTION SUBCUTANEOUS DAILY
Refills: 0 | Status: DISCONTINUED | OUTPATIENT
Start: 2019-06-13 | End: 2019-06-17

## 2019-06-13 RX ORDER — CEFEPIME 1 G/1
2000 INJECTION, POWDER, FOR SOLUTION INTRAMUSCULAR; INTRAVENOUS DAILY
Refills: 0 | Status: DISCONTINUED | OUTPATIENT
Start: 2019-06-14 | End: 2019-06-21

## 2019-06-13 RX ORDER — WARFARIN SODIUM 2.5 MG/1
2 TABLET ORAL ONCE
Refills: 0 | Status: DISCONTINUED | OUTPATIENT
Start: 2019-06-13 | End: 2019-06-13

## 2019-06-13 RX ORDER — ENOXAPARIN SODIUM 100 MG/ML
60 INJECTION SUBCUTANEOUS EVERY 12 HOURS
Refills: 0 | Status: DISCONTINUED | OUTPATIENT
Start: 2019-06-13 | End: 2019-06-13

## 2019-06-13 RX ADMIN — ENOXAPARIN SODIUM 60 MILLIGRAM(S): 100 INJECTION SUBCUTANEOUS at 18:28

## 2019-06-13 RX ADMIN — ATORVASTATIN CALCIUM 80 MILLIGRAM(S): 80 TABLET, FILM COATED ORAL at 21:20

## 2019-06-13 RX ADMIN — Medication 81 MILLIGRAM(S): at 11:07

## 2019-06-13 RX ADMIN — PANTOPRAZOLE SODIUM 40 MILLIGRAM(S): 20 TABLET, DELAYED RELEASE ORAL at 05:22

## 2019-06-13 RX ADMIN — Medication 0.12 MILLIGRAM(S): at 05:22

## 2019-06-13 RX ADMIN — ESCITALOPRAM OXALATE 5 MILLIGRAM(S): 10 TABLET, FILM COATED ORAL at 11:07

## 2019-06-13 RX ADMIN — CEFEPIME 100 MILLIGRAM(S): 1 INJECTION, POWDER, FOR SOLUTION INTRAMUSCULAR; INTRAVENOUS at 05:22

## 2019-06-13 RX ADMIN — MIRTAZAPINE 15 MILLIGRAM(S): 45 TABLET, ORALLY DISINTEGRATING ORAL at 21:20

## 2019-06-13 NOTE — PROGRESS NOTE ADULT - SUBJECTIVE AND OBJECTIVE BOX
Resting    Vital Signs Last 24 Hrs  T(C): 36.6 (06-13-19 @ 16:14), Max: 36.6 (06-13-19 @ 16:14)  T(F): 97.8 (06-13-19 @ 16:14), Max: 97.8 (06-13-19 @ 16:14)  HR: 76 (06-13-19 @ 16:14) (76 - 91)  BP: 109/60 (06-13-19 @ 16:14) (99/53 - 119/58)  RR: 16 (06-13-19 @ 16:14) (16 - 19)  SpO2: 95% (06-13-19 @ 16:14) (95% - 100%)    Card S1S2  Lungs fair air entry b/l  Abd soft, NT, ND  Extr + edema, erythema LLE, improving                                                                          10.4   10.58 )-----------( 279      ( 13 Jun 2019 05:10 )             33.1     13 Jun 2019 05:10    139    |  103    |  36     ----------------------------<  97     4.6     |  30     |  1.56     Ca    9.4        13 Jun 2019     PT/INR - ( 13 Jun 2019 05:10 )   PT: 33.2 sec;   INR: 2.87 ratio      acetaminophen   Tablet .. 650 milliGRAM(s) Oral every 6 hours PRN  aspirin enteric coated 81 milliGRAM(s) Oral daily  atorvastatin 80 milliGRAM(s) Oral at bedtime  cefepime   IVPB 2000 milliGRAM(s) IV Intermittent daily  digoxin     Tablet 0.125 milliGRAM(s) Oral daily  enoxaparin Injectable 60 milliGRAM(s) SubCutaneous daily  escitalopram 5 milliGRAM(s) Oral daily  mirtazapine 15 milliGRAM(s) Oral at bedtime    A/P:    Septic/hemodynamic HENRY on CKD 3  No nephrotoxins  F/u BMP   Avoid hypotension   Encourage PO fluids  Strep bacteremia  Abx per ID  If Cr higher in am, will order IVF  Will follow

## 2019-06-13 NOTE — PROGRESS NOTE ADULT - SUBJECTIVE AND OBJECTIVE BOX
Follow up for  SUBJ:    no curernt complaitns      PMH  Aortic valve disease  Non-rheumatic mitral regurgitation  HLD (hyperlipidemia)  Melanoma  Diverticulosis of small intestine without hemorrhage  Afib  Anxiety  Essential hypertension      MEDICATIONS  (STANDING):  aspirin enteric coated 81 milliGRAM(s) Oral daily  atorvastatin 80 milliGRAM(s) Oral at bedtime  cefepime   IVPB 2000 milliGRAM(s) IV Intermittent daily  digoxin     Tablet 0.125 milliGRAM(s) Oral daily  enoxaparin Injectable 60 milliGRAM(s) SubCutaneous daily  escitalopram 5 milliGRAM(s) Oral daily  mirtazapine 15 milliGRAM(s) Oral at bedtime    MEDICATIONS  (PRN):  acetaminophen   Tablet .. 650 milliGRAM(s) Oral every 6 hours PRN Temp greater or equal to 38C (100.4F), Mild Pain (1 - 3)        PHYSICAL EXAM:  Vital Signs Last 24 Hrs  T(C): 36.6 (13 Jun 2019 16:14), Max: 36.6 (13 Jun 2019 16:14)  T(F): 97.8 (13 Jun 2019 16:14), Max: 97.8 (13 Jun 2019 16:14)  HR: 76 (13 Jun 2019 16:14) (76 - 89)  BP: 109/60 (13 Jun 2019 16:14) (99/53 - 110/68)  BP(mean): --  RR: 16 (13 Jun 2019 16:14) (16 - 19)  SpO2: 95% (13 Jun 2019 16:14) (95% - 100%)    GENERAL: NAD, eldelry woman getting pt son at bedside   HEAD:  Atraumatic, Normocephalic  EYES: EOMI, PERRLA, conjunctiva and sclera clear  ENT: Moist mucous membranes,  NECK: Supple, No JVD, no bruits  CHEST/LUNG: Clear to percussion bilaterally; No rales, rhonchi, wheezing, or rubs  HEART: Regular rate and rhythm; No murmurs, rubs, or gallops PMI non displaced.  ABDOMEN: Soft, Nontender, Nondistended; Bowel sounds present  EXTREMITIES:  2+ Peripheral Pulses, No clubbing, cyanosis, or edema  SKIN: No rashes or lesions  NERVOUS SYSTEM:  Cranial Nerves II-XII intact      TELEMETRY:    afib    ECG:    < from: 12 Lead ECG (06.06.19 @ 09:18) >  Ventricular Rate 74 BPM    Atrial Rate 50 BPM    QRS Duration 102 ms    Q-T Interval 412 ms    QTC Calculation(Bezet) 457 ms    R Axis 117 degrees    T Axis -17 degrees    Diagnosis Line Atrial fibrillation  Right axis deviation  Incomplete right bundle branch block  Possible Right ventricular hypertrophy  Nonspecific ST abnormality  Abnormal QRS-T angle, consider primary T wave abnormality  Abnormal ECG  When compared with ECG of 06-JUN-2019 06:42,  No significant change was found    ESSENCE Johnson MD ([id number]) on 6/6/2019 3:29:17 PM    < end of copied text >    ECHO:    < from: TTE Echo Complete w/Doppler (06.06.19 @ 11:39) >  Summary:   1. Left ventricular ejection fraction, by visual estimation, is 60%.   2. Normal global left ventricular systolic function.   3. Spectral Doppler shows restrictive pattern of left ventricular   myocardial filling (Grade III diastolic dysfunction).   4. D systolic flattening consistent with severe pulmonary hypertenisn.   5. Severely enlarged right ventricle.   6. Severe tricuspid regurgitation.   7. Aortic valve is normally functioning mechanical prosthetic valve.   8. Mechanical prosthesis in the aortic valve position.   9. Moderate pulmonic valve regurgitation.  10. Estimated pulmonary artery systolic pressure is 135.6 mmHg assuming a   right atrial pressure of 15 mmHg, which is consistent with severe   pulmonary hypertension.  11. Severe pulmonary hypertension is present.  12. Moderate interatrial left to right shunt consider a PFO a patent   formaen ovale.  13. Bernoulli equation may be inaccurate in the setting of severe TR.   elevated qp/qs ratio >2 with right heart enlargement suggests a signicant   intracardiac shunt.  14. Biatrial enlargement suggests restrictive physiology.  15. Aortic doppler consistent with a mature aortic vavle prosthesis.    [id number] Essence Simeon , Electronically signed on 6/6/2019 at 3:13:24   PM         *** Final ***      ESSENCE SIMEON     < end of copied text >      LABS:                        10.4   10.58 )-----------( 279      ( 13 Jun 2019 05:10 )             33.1     06-13    139  |  103  |  36<H>  ----------------------------<  97  4.6   |  30  |  1.56<H>    Ca    9.4      13 Jun 2019 05:10          PT/INR - ( 13 Jun 2019 05:10 )   PT: 33.2 sec;   INR: 2.87 ratio             I&O's Summary    12 Jun 2019 07:01  -  13 Jun 2019 07:00  --------------------------------------------------------  IN: 820 mL / OUT: 0 mL / NET: 820 mL    13 Jun 2019 07:01  -  13 Jun 2019 22:33  --------------------------------------------------------  IN: 600 mL / OUT: 0 mL / NET: 600 mL      BNP    RADIOLOGY & ADDITIONAL STUDIES:    ECHO:

## 2019-06-13 NOTE — CHART NOTE - NSCHARTNOTEFT_GEN_A_CORE
Reviewed prior progress notes, labs and imaging.    Discussed with Dr. Russo.    Primary Diagnosis: Left LE cellulitis       Plan: repeat BCx = negative, cleared by ID - Dr. Diaz for PICC - pt will need  5 more weeks of IV Cefepime 2 Gm daily; NP spoke with ICU PA for PICC placement - either today (no PICC kits for now)  or tomorrow;  will arrange home infusion         Anticipated Discharge: within 24 hrs Reviewed prior progress notes, labs and imaging.    Discussed with Dr. Russo.    Primary Diagnosis: Left LE cellulitis       Plan: repeat BCx = negative, cleared by ID - Dr. Diaz for PICC - pt will need  5 more weeks of IV Cefepime 2 Gm daily; NP spoke with ICU PA for PICC placement   However unable to place PICC as INR is 2.87 - needs to be < 2.0  Will hold coumadin and start lovenox treatment dose at 60 mg daily based on weight and creatinine clearance   Will place PICC when INR  <2 and d/sc home with home infusion - cefepime 2 GM IV daily     Anticipated Discharge: within 24 hrs

## 2019-06-13 NOTE — PROGRESS NOTE ADULT - ASSESSMENT
Appearance of the blood cultures from 9 June are clear we'll discuss with infectious disease placement of the PICC line arrangements can then be made for discharge when arrangements made

## 2019-06-13 NOTE — PROGRESS NOTE ADULT - SUBJECTIVE AND OBJECTIVE BOX
Patient is a 89y old  Female who presents with a chief complaint of left lower extremity cellulitis (12 Jun 2019 18:12)      INTERVAL HPI/OVERNIGHT EVENTS:  Alert with no complaints      REVIEW OF SYSTEMS:  CONSTITUTIONAL: No fever, weight loss, or fatigue  EYES: No eye pain, visual disturbances, or discharge  ENMT:  No difficulty hearing, tinnitus, vertigo; No sinus or throat pain  NECK: No pain or stiffness  BREASTS: No pain, masses, or nipple discharge  RESPIRATORY: No cough, wheezing, chills or hemoptysis; No shortness of breath  CARDIOVASCULAR: No chest pain, palpitations, dizziness, or leg swelling  GASTROINTESTINAL: No abdominal or epigastric pain. No nausea, vomiting, or hematemesis; No diarrhea or constipation. No melena or hematochezia.  GENITOURINARY: No dysuria, frequency, hematuria, or incontinence  NEUROLOGICAL: No headaches, memory loss, loss of strength, numbness, or tremors  SKIN: No itching, burning, rashes, or lesions   LYMPH NODES: No enlarged glands  ENDOCRINE: No heat or cold intolerance; No hair loss  MUSCULOSKELETAL: No joint pain or swelling; No muscle, back, or extremity pain  PSYCHIATRIC: No depression, anxiety, mood swings, or difficulty sleeping  HEME/LYMPH: No easy bruising, or bleeding gums  ALLERY AND IMMUNOLOGIC: No hives or eczema  FAMILY HISTORY:  No significant family history    T(C): 36.4 (06-13-19 @ 07:38), Max: 36.4 (06-12-19 @ 09:51)  HR: 89 (06-13-19 @ 07:38) (82 - 122)  BP: 110/68 (06-13-19 @ 07:38) (91/48 - 119/58)  RR: 19 (06-13-19 @ 07:38) (15 - 19)  SpO2: 100% (06-13-19 @ 07:38) (96% - 100%)  Wt(kg): --Vital Signs Last 24 Hrs  T(C): 36.4 (13 Jun 2019 07:38), Max: 36.4 (12 Jun 2019 09:51)  T(F): 97.6 (13 Jun 2019 07:38), Max: 97.6 (12 Jun 2019 09:51)  HR: 89 (13 Jun 2019 07:38) (82 - 122)  BP: 110/68 (13 Jun 2019 07:38) (91/48 - 119/58)  BP(mean): --  RR: 19 (13 Jun 2019 07:38) (15 - 19)  SpO2: 100% (13 Jun 2019 07:38) (96% - 100%)    T(F): 97.6 (06-13-19 @ 07:38), Max: 98.1 (06-10-19 @ 19:52)  HR: 89 (06-13-19 @ 07:38) (54 - 122)  BP: 110/68 (06-13-19 @ 07:38) (91/48 - 119/58)  RR: 19 (06-13-19 @ 07:38) (14 - 19)  SpO2: 100% (06-13-19 @ 07:38) (83% - 100%)    PHYSICAL EXAM:  GENERAL: NAD, well-groomed, well-developed  HEAD:  Atraumatic, Normocephalic  EYES: EOMI, PERRLA, conjunctiva and sclera clear  ENMT: No tonsillar erythema, exudates, or enlargement; Moist mucous membranes, Good dentition, No lesions  NECK: Supple, No JVD, Normal thyroid  NERVOUS SYSTEM:  Alert & Oriented X3, Good concentration; Motor Strength 5/5 B/L upper and lower extremities; DTRs 2+ intact and symmetric  CHEST/LUNG: Clear to percussion bilaterally; No rales, rhonchi, wheezing, or rubs  HEART: Regular rate and rhythm; No murmurs, rubs, or gallops  ABDOMEN: Soft, Nontender, Nondistended; Bowel sounds present  EXTREMITIES:  2+ Peripheral Pulses, No clubbing, cyanosis, or edema  LYMPH: No lymphadenopathy noted  SKIN: No rashes or lesions    Consultant(s) Notes Reviewed:  [x ] YES  [ ] NO  Care Discussed with Consultants/Other Providers [ x] YES  [ ] NO    LABS:  06-13    139  |  103  |  36<H>  ----------------------------<  97  4.6   |  30  |  1.56<H>    Ca    9.4      13 Jun 2019 05:10                            10.4   10.58 )-----------( 279      ( 13 Jun 2019 05:10 )             33.1         PT/INR - ( 13 Jun 2019 05:10 )   PT: 33.2 sec;   INR: 2.87 ratio                              10.4   10.58 )-----------( 279      ( 06-13 @ 05:10 )             33.1                10.5   8.86  )-----------( 275      ( 06-12 @ 07:14 )             32.4                11.2   10.28 )-----------( 292      ( 06-11 @ 13:35 )             34.3                12.7   12.95 )-----------( 237      ( 06-09 @ 10:50 )             39.5                11.9   10.86 )-----------( 249      ( 06-08 @ 06:23 )             37.2                11.7   9.02  )-----------( 242      ( 06-06 @ 05:05 )             35.8                11.3   9.31  )-----------( 187      ( 06-05 @ 06:55 )             34.3                13.1   10.51 )-----------( 196      ( 06-04 @ 13:15 )             39.6               RADIOLOGY & ADDITIONAL TESTS:    Imaging Personally Reviewed:  [ ] YES  [ ] NO  acetaminophen   Tablet .. 650 milliGRAM(s) Oral every 6 hours PRN  aspirin enteric coated 81 milliGRAM(s) Oral daily  atorvastatin 80 milliGRAM(s) Oral at bedtime  cefepime   IVPB      cefepime   IVPB 2000 milliGRAM(s) IV Intermittent every 12 hours  digoxin     Tablet 0.125 milliGRAM(s) Oral daily  escitalopram 5 milliGRAM(s) Oral daily  mirtazapine 15 milliGRAM(s) Oral at bedtime  pantoprazole    Tablet 40 milliGRAM(s) Oral before breakfast      HEALTH ISSUES - PROBLEM Dx:  Sigmoid thickening: Sigmoid thickening  Bacteremia: Bacteremia  Hypokalemia: Hypokalemia  Essential hypertension: Essential hypertension  Anxiety: Anxiety  Atrial fibrillation, unspecified type: Atrial fibrillation, unspecified type  Aortic valve disease: Aortic valve disease  Skin ulcer, unspecified ulcer stage: Skin ulcer, unspecified ulcer stage  HENRY (acute kidney injury): HENRY (acute kidney injury)  Cellulitis of left leg: Cellulitis of left leg

## 2019-06-13 NOTE — PROGRESS NOTE ADULT - SUBJECTIVE AND OBJECTIVE BOX
CC: f/u for  strept and pseudomonas bacteremia and cellulitis of the left leg  Patient reports  leg feels much better  REVIEW OF SYSTEMS:  All other review of systems negative (Comprehensive ROS)    Antimicrobials Day #  :9/42 total abx, day 4 cefepime  cefepime   IVPB 2000 milliGRAM(s) IV Intermittent daily    Other Medications Reviewed    T(F): 97.8 (06-13-19 @ 16:14), Max: 97.8 (06-13-19 @ 16:14)  HR: 76 (06-13-19 @ 16:14)  BP: 109/60 (06-13-19 @ 16:14)  RR: 16 (06-13-19 @ 16:14)  SpO2: 95% (06-13-19 @ 16:14)  Wt(kg): --    PHYSICAL EXAM:  General: alert, no acute distress  Eyes:  anicteric, no conjunctival injection, no discharge  Oropharynx: no lesions or injection 	  Neck: supple, without adenopathy  Lungs: basilar rales to auscultation  Heart: s1s2 2/6 sys m  Abdomen: soft, nondistended, nontender, without mass or organomegaly  Skin: no lesions  Extremities: left leg redness, swelling and edema markedly better  Neurologic: alert, oriented, moves all extremities    LAB RESULTS:                        10.4   10.58 )-----------( 279      ( 13 Jun 2019 05:10 )             33.1     06-13    139  |  103  |  36<H>  ----------------------------<  97  4.6   |  30  |  1.56<H>    Ca    9.4      13 Jun 2019 05:10          MICROBIOLOGY:  RECENT CULTURES:  06-09 @ 20:35 .Blood Blood     No growth to date.          RADIOLOGY REVIEWED:    < from: CT Abdomen and Pelvis w/ Oral Cont (06.11.19 @ 15:49) >  IMPRESSION:     No pneumonia.    Pulmonary edema with moderate right and small left pleural effusion.    3.0 cm hematoma in the right pectoralis muscle with hemorrhagic fluid   level.    No evidence of intra-abdominal or pelvic sepsis.    Stable masslike thickening of the sigmoid colon. Differential includes   neoplasm, colitis, or chronic diverticular disease. Colonoscopy with   biopsy is needed for a definitive diagnosis.      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 both high grade so endovascular infection of concern. Had ct for source search with colon findings but i really doubt she would tolerate colon surgery so not sure if colonoscopy should be pursued so defer to dr Roman. HEr leg may have been source of pseudomonas as it has dramatically improved since changed to cefepime.   Plan:  finish another 33 days of cefepime  place picc  elevate leg  defer to dr roman if work up colon further

## 2019-06-13 NOTE — CHART NOTE - NSCHARTNOTEFT_GEN_A_CORE
Nutrition Follow Up Note  Hospital Course (Per Electronic Medical Record):   Source: Medical Record [X] Patient [X]  Nursing Staff [X]     Diet: Low fiber, DASH/TLC, Ensure Enlive QD    Patient noted with a good po consuming 75% of meals.     Current Weight: (6/13) 134.5/70.1kg, weight appears to vary , patient on Lasix (6/3) discontinued .    Pertinent Medications: MEDICATIONS  (STANDING):  aspirin enteric coated 81 milliGRAM(s) Oral daily  atorvastatin 80 milliGRAM(s) Oral at bedtime  cefepime   IVPB      cefepime   IVPB 2000 milliGRAM(s) IV Intermittent every 12 hours  digoxin     Tablet 0.125 milliGRAM(s) Oral daily  escitalopram 5 milliGRAM(s) Oral daily  mirtazapine 15 milliGRAM(s) Oral at bedtime    MEDICATIONS  (PRN):  acetaminophen   Tablet .. 650 milliGRAM(s) Oral every 6 hours PRN Temp greater or equal to 38C (100.4F), Mild Pain (1 - 3)      Pertinent Labs:  06-13 Na139 mmol/L Glu 97 mg/dL K+ 4.6 mmol/L Cr  1.56 mg/dL<H> BUN 36 mg/dL<H>        Skin: Stage 1 sacrum    Edema: (+2) LE edema noted.    Last BM: on (6/11)    Estimated Needs:   [X] No Change since Previous Assessment  [X] Recalculated:     Previous Nutrition Diagnosis: Moderate Malnutrition, receiving po nutritional supplement.    Nutrition Diagnosis is [X] Ongoing         New Nutrition Diagnosis: [X] Not Applicable      Interventions:   1. Continue curent diet regimen       Monitoring & Evaluation: will monitor:  [X] Weights   [X] PO Intake   [X] Follow Up (Per Protocol)  [X] Tolerance to Diet Prescription       RD to follow as per Nutrition protocol  Mary Ann Smith RDN

## 2019-06-14 LAB
ANION GAP SERPL CALC-SCNC: 9 MMOL/L — SIGNIFICANT CHANGE UP (ref 5–17)
BUN SERPL-MCNC: 32 MG/DL — HIGH (ref 7–23)
CALCIUM SERPL-MCNC: 9.7 MG/DL — SIGNIFICANT CHANGE UP (ref 8.4–10.5)
CHLORIDE SERPL-SCNC: 103 MMOL/L — SIGNIFICANT CHANGE UP (ref 96–108)
CO2 SERPL-SCNC: 27 MMOL/L — SIGNIFICANT CHANGE UP (ref 22–31)
CREAT SERPL-MCNC: 1.4 MG/DL — HIGH (ref 0.5–1.3)
DIGOXIN SERPL-MCNC: 1 NG/ML — SIGNIFICANT CHANGE UP (ref 0.8–2)
GLUCOSE SERPL-MCNC: 102 MG/DL — HIGH (ref 70–99)
HCT VFR BLD CALC: 38.5 % — SIGNIFICANT CHANGE UP (ref 34.5–45)
HGB BLD-MCNC: 12.2 G/DL — SIGNIFICANT CHANGE UP (ref 11.5–15.5)
INR BLD: 2.7 RATIO — HIGH (ref 0.88–1.16)
MCHC RBC-ENTMCNC: 31 PG — SIGNIFICANT CHANGE UP (ref 27–34)
MCHC RBC-ENTMCNC: 31.7 GM/DL — LOW (ref 32–36)
MCV RBC AUTO: 98 FL — SIGNIFICANT CHANGE UP (ref 80–100)
NRBC # BLD: 0 /100 WBCS — SIGNIFICANT CHANGE UP (ref 0–0)
PLATELET # BLD AUTO: 211 K/UL — SIGNIFICANT CHANGE UP (ref 150–400)
POTASSIUM SERPL-MCNC: 3.7 MMOL/L — SIGNIFICANT CHANGE UP (ref 3.5–5.3)
POTASSIUM SERPL-SCNC: 3.7 MMOL/L — SIGNIFICANT CHANGE UP (ref 3.5–5.3)
PROTHROM AB SERPL-ACNC: 31.2 SEC — HIGH (ref 10–12.9)
RBC # BLD: 3.93 M/UL — SIGNIFICANT CHANGE UP (ref 3.8–5.2)
RBC # FLD: 22.2 % — HIGH (ref 10.3–14.5)
SODIUM SERPL-SCNC: 139 MMOL/L — SIGNIFICANT CHANGE UP (ref 135–145)
WBC # BLD: 8.35 K/UL — SIGNIFICANT CHANGE UP (ref 3.8–10.5)
WBC # FLD AUTO: 8.35 K/UL — SIGNIFICANT CHANGE UP (ref 3.8–10.5)

## 2019-06-14 PROCEDURE — 99232 SBSQ HOSP IP/OBS MODERATE 35: CPT

## 2019-06-14 RX ADMIN — ATORVASTATIN CALCIUM 80 MILLIGRAM(S): 80 TABLET, FILM COATED ORAL at 21:36

## 2019-06-14 RX ADMIN — Medication 0.12 MILLIGRAM(S): at 05:29

## 2019-06-14 RX ADMIN — ENOXAPARIN SODIUM 60 MILLIGRAM(S): 100 INJECTION SUBCUTANEOUS at 11:31

## 2019-06-14 RX ADMIN — MIRTAZAPINE 15 MILLIGRAM(S): 45 TABLET, ORALLY DISINTEGRATING ORAL at 21:36

## 2019-06-14 RX ADMIN — Medication 81 MILLIGRAM(S): at 11:31

## 2019-06-14 RX ADMIN — ESCITALOPRAM OXALATE 5 MILLIGRAM(S): 10 TABLET, FILM COATED ORAL at 11:30

## 2019-06-14 RX ADMIN — CEFEPIME 100 MILLIGRAM(S): 1 INJECTION, POWDER, FOR SOLUTION INTRAMUSCULAR; INTRAVENOUS at 12:16

## 2019-06-14 NOTE — PROGRESS NOTE ADULT - SUBJECTIVE AND OBJECTIVE BOX
CC: f/u for  strept and pseudomonas bacteremia and cellulitis of the left leg  Patient reports leg feels much better  REVIEW OF SYSTEMS:  All other review of systems negative (Comprehensive ROS)    Antimicrobials Day #  :10/42  cefepime   IVPB 2000 milliGRAM(s) IV Intermittent daily       Other Medications Reviewed    T(F): 97.8 (06-13-19 @ 16:14), Max: 97.8 (06-13-19 @ 16:14)  HR: 76 (06-13-19 @ 16:14)  BP: 109/60 (06-13-19 @ 16:14)  RR: 16 (06-13-19 @ 16:14)  SpO2: 95% (06-13-19 @ 16:14)  Wt(kg): --    PHYSICAL EXAM:  General: alert, no acute distress  Eyes:  anicteric, no conjunctival injection, no discharge  Oropharynx: no lesions or injection 	  Neck: supple, without adenopathy  Lungs: basilar rales to auscultation  Heart: s1s2 2/6 sys m  Abdomen: soft, nondistended, nontender, without mass or organomegaly  Skin: no lesions  Extremities: left leg redness, swelling and edema markedly better  Neurologic: alert, oriented, moves all extremities    LAB RESULTS:                                         12.2   8.35  )-----------( 211      ( 14 Jun 2019 06:19 )             38.5   06-14    139  |  103  |  32<H>  ----------------------------<  102<H>  3.7   |  27  |  1.40<H>    Ca    9.7      14 Jun 2019 06:19              MICROBIOLOGY:  RECENT CULTURES:  06-09 @ 20:35 .Blood Blood     No growth to date.          RADIOLOGY REVIEWED:    < from: CT Abdomen and Pelvis w/ Oral Cont (06.11.19 @ 15:49) >  IMPRESSION:     No pneumonia.    Pulmonary edema with moderate right and small left pleural effusion.    3.0 cm hematoma in the right pectoralis muscle with hemorrhagic fluid   level.    No evidence of intra-abdominal or pelvic sepsis.    Stable masslike thickening of the sigmoid colon. Differential includes   neoplasm, colitis, or chronic diverticular disease. Colonoscopy with   biopsy is needed for a definitive diagnosis.      < end of copied text >

## 2019-06-14 NOTE — PROGRESS NOTE ADULT - SUBJECTIVE AND OBJECTIVE BOX
Patient is a 89y old  Female who presents with a chief complaint of left lower extremity cellulitis (13 Jun 2019 22:33)      INTERVAL HPI/OVERNIGHT EVENTS:no complaints      REVIEW OF SYSTEMS:  CONSTITUTIONAL: No fever, weight loss, or fatigue  EYES: No eye pain, visual disturbances, or discharge  ENMT:  No difficulty hearing, tinnitus, vertigo; No sinus or throat pain  NECK: No pain or stiffness  BREASTS: No pain, masses, or nipple discharge  RESPIRATORY: No cough, wheezing, chills or hemoptysis; No shortness of breath  CARDIOVASCULAR: No chest pain, palpitations, dizziness, or leg swelling  GASTROINTESTINAL: No abdominal or epigastric pain. No nausea, vomiting, or hematemesis; No diarrhea or constipation. No melena or hematochezia.  GENITOURINARY: No dysuria, frequency, hematuria, or incontinence  NEUROLOGICAL: No headaches, memory loss, loss of strength, numbness, or tremors  SKIN: No itching, burning, rashes, or lesions   LYMPH NODES: No enlarged glands  ENDOCRINE: No heat or cold intolerance; No hair loss  MUSCULOSKELETAL: No joint pain or swelling; No muscle, back, or extremity pain  PSYCHIATRIC: No depression, anxiety, mood swings, or difficulty sleeping  HEME/LYMPH: No easy bruising, or bleeding gums  ALLERY AND IMMUNOLOGIC: No hives or eczema  FAMILY HISTORY:  No significant family history    T(C): 36.2 (06-14-19 @ 05:26), Max: 36.6 (06-13-19 @ 16:14)  HR: 80 (06-14-19 @ 05:26) (76 - 90)  BP: 106/55 (06-14-19 @ 05:26) (99/53 - 109/60)  RR: 18 (06-14-19 @ 05:26) (16 - 18)  SpO2: 100% (06-14-19 @ 05:26) (95% - 100%)  Wt(kg): --Vital Signs Last 24 Hrs  T(C): 36.2 (14 Jun 2019 05:26), Max: 36.6 (13 Jun 2019 16:14)  T(F): 97.2 (14 Jun 2019 05:26), Max: 97.8 (13 Jun 2019 16:14)  HR: 80 (14 Jun 2019 05:26) (76 - 90)  BP: 106/55 (14 Jun 2019 05:26) (99/53 - 109/60)  BP(mean): --  RR: 18 (14 Jun 2019 05:26) (16 - 18)  SpO2: 100% (14 Jun 2019 05:26) (95% - 100%)    T(F): 97.2 (06-14-19 @ 05:26), Max: 98 (06-12-19 @ 03:00)  HR: 80 (06-14-19 @ 05:26) (76 - 122)  BP: 106/55 (06-14-19 @ 05:26) (91/48 - 119/58)  RR: 18 (06-14-19 @ 05:26) (15 - 19)  SpO2: 100% (06-14-19 @ 05:26) (95% - 100%)    PHYSICAL EXAM:  GENERAL: NAD, well-groomed, well-developed  HEAD:  Atraumatic, Normocephalic  EYES: EOMI, PERRLA, conjunctiva and sclera clear  ENMT: No tonsillar erythema, exudates, or enlargement; Moist mucous membranes, Good dentition, No lesions  NECK: Supple, No JVD, Normal thyroid  NERVOUS SYSTEM:  Alert & Oriented X3, Good concentration; Motor Strength 5/5 B/L upper and lower extremities; DTRs 2+ intact and symmetric  CHEST/LUNG: Clear to percussion bilaterally; No rales, rhonchi, wheezing, or rubs  HEART: Regular rate and rhythm; No murmurs, rubs, or gallops  ABDOMEN: Soft, Nontender, Nondistended; Bowel sounds present  EXTREMITIES:  2+ Peripheral Pulses, No clubbing, cyanosis, or edema  LYMPH: No lymphadenopathy noted  SKIN: No rashes or lesions    Consultant(s) Notes Reviewed:  [x ] YES  [ ] NO  Care Discussed with Consultants/Other Providers [ x] YES  [ ] NO    LABS:  06-14    139  |  103  |  32<H>  ----------------------------<  102<H>  3.7   |  27  |  1.40<H>    Ca    9.7      14 Jun 2019 06:19                            12.2   8.35  )-----------( 211      ( 14 Jun 2019 06:19 )             38.5         PT/INR - ( 14 Jun 2019 06:19 )   PT: 31.2 sec;   INR: 2.70 ratio                              12.2   8.35  )-----------( 211      ( 06-14 @ 06:19 )             38.5                10.4   10.58 )-----------( 279      ( 06-13 @ 05:10 )             33.1                10.5   8.86  )-----------( 275      ( 06-12 @ 07:14 )             32.4                11.2   10.28 )-----------( 292      ( 06-11 @ 13:35 )             34.3                12.7   12.95 )-----------( 237      ( 06-09 @ 10:50 )             39.5                11.9   10.86 )-----------( 249      ( 06-08 @ 06:23 )             37.2                11.7   9.02  )-----------( 242      ( 06-06 @ 05:05 )             35.8                11.3   9.31  )-----------( 187      ( 06-05 @ 06:55 )             34.3                13.1   10.51 )-----------( 196      ( 06-04 @ 13:15 )             39.6               RADIOLOGY & ADDITIONAL TESTS:    Imaging Personally Reviewed:  [ ] YES  [ ] NO  acetaminophen   Tablet .. 650 milliGRAM(s) Oral every 6 hours PRN  aspirin enteric coated 81 milliGRAM(s) Oral daily  atorvastatin 80 milliGRAM(s) Oral at bedtime  cefepime   IVPB 2000 milliGRAM(s) IV Intermittent daily  digoxin     Tablet 0.125 milliGRAM(s) Oral daily  enoxaparin Injectable 60 milliGRAM(s) SubCutaneous daily  escitalopram 5 milliGRAM(s) Oral daily  mirtazapine 15 milliGRAM(s) Oral at bedtime      HEALTH ISSUES - PROBLEM Dx:  Sigmoid thickening: Sigmoid thickening  Bacteremia: Bacteremia  Hypokalemia: Hypokalemia  Essential hypertension: Essential hypertension  Anxiety: Anxiety  Atrial fibrillation, unspecified type: Atrial fibrillation, unspecified type  Aortic valve disease: Aortic valve disease  Skin ulcer, unspecified ulcer stage: Skin ulcer, unspecified ulcer stage  HENRY (acute kidney injury): HENRY (acute kidney injury)  Cellulitis of left leg: Cellulitis of left leg

## 2019-06-14 NOTE — PROGRESS NOTE ADULT - SUBJECTIVE AND OBJECTIVE BOX
Resting    Vital Signs Last 24 Hrs  T(C): 36.4 (06-14-19 @ 16:24), Max: 36.4 (06-14-19 @ 16:24)  T(F): 97.5 (06-14-19 @ 16:24), Max: 97.5 (06-14-19 @ 16:24)  HR: 65 (06-14-19 @ 16:24) (65 - 90)  BP: 103/60 (06-14-19 @ 16:24) (97/51 - 108/64)  RR: 16 (06-14-19 @ 16:24) (16 - 18)  SpO2: 100% (06-14-19 @ 16:24) (96% - 100%)    Card S1S2  Lungs fair air entry b/l  Abd soft, NT, ND  Extr + edema, erythema LLE, improving                                                                                   12.2   8.35  )-----------( 211      ( 14 Jun 2019 06:19 )             38.5     14 Jun 2019 06:19    139    |  103    |  32     ----------------------------<  102    3.7     |  27     |  1.40     Ca    9.7        14 Jun 2019 06:19    PT/INR - ( 14 Jun 2019 06:19 )   PT: 31.2 sec;   INR: 2.70 ratio      acetaminophen   Tablet .. 650 milliGRAM(s) Oral every 6 hours PRN  aspirin enteric coated 81 milliGRAM(s) Oral daily  atorvastatin 80 milliGRAM(s) Oral at bedtime  cefepime   IVPB 2000 milliGRAM(s) IV Intermittent daily  digoxin     Tablet 0.125 milliGRAM(s) Oral daily  enoxaparin Injectable 60 milliGRAM(s) SubCutaneous daily  escitalopram 5 milliGRAM(s) Oral daily  mirtazapine 15 milliGRAM(s) Oral at bedtime    A/P:    Septic/hemodynamic HENRY on CKD 3  Improved  No nephrotoxins  F/u BMP   Avoid hypotension   Encourage PO fluids  Strep bacteremia  Abx per ID

## 2019-06-14 NOTE — CHART NOTE - NSCHARTNOTEFT_GEN_A_CORE
Reviewed prior progress notes, labs and imaging.    Discussed with Dr. Russo.    Primary Diagnosis: Left LE cellulitis       Plan: repeat BCx = negative, cleared by ID - Dr. Diaz for PICC - pt will need  5 more weeks of IV Cefepime 2 Gm daily; NP spoke with ICU PA for PICC placement   However unable to place PICC as INR is 2.2 - needs to be < 2.0  Will hold coumadin and continue lovenox treatment dose at 60 mg daily based on weight and creatinine clearance   Will place PICC when INR  <2 and d/sc home with home infusion - cefepime 2 GM IV daily     Anticipated Discharge: when INR <2.

## 2019-06-14 NOTE — PROGRESS NOTE ADULT - ASSESSMENT
INR still over 2  with Lovenox bridging once INR becomes lower than 2 PICC line placed and discharge home if arrangements have been made

## 2019-06-14 NOTE — PROGRESS NOTE ADULT - ASSESSMENT
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   bacteremias now controlled and cellulitis has resolved  no fever and no leukocytosis    Plan:    day 10/42 of Cefepime, awaiting PICC  Keep leg elevated  w/u for GI malgnancy if candidate  Goals of care to be addressed  d/w family at bedside

## 2019-06-15 LAB
ANION GAP SERPL CALC-SCNC: 5 MMOL/L — SIGNIFICANT CHANGE UP (ref 5–17)
BUN SERPL-MCNC: 30 MG/DL — HIGH (ref 7–23)
CALCIUM SERPL-MCNC: 9.6 MG/DL — SIGNIFICANT CHANGE UP (ref 8.4–10.5)
CHLORIDE SERPL-SCNC: 107 MMOL/L — SIGNIFICANT CHANGE UP (ref 96–108)
CO2 SERPL-SCNC: 28 MMOL/L — SIGNIFICANT CHANGE UP (ref 22–31)
CREAT SERPL-MCNC: 1.37 MG/DL — HIGH (ref 0.5–1.3)
CULTURE RESULTS: SIGNIFICANT CHANGE UP
CULTURE RESULTS: SIGNIFICANT CHANGE UP
GLUCOSE SERPL-MCNC: 118 MG/DL — HIGH (ref 70–99)
HCT VFR BLD CALC: 36.4 % — SIGNIFICANT CHANGE UP (ref 34.5–45)
HGB BLD-MCNC: 11.3 G/DL — LOW (ref 11.5–15.5)
INR BLD: 2.19 RATIO — HIGH (ref 0.88–1.16)
MCHC RBC-ENTMCNC: 31 GM/DL — LOW (ref 32–36)
MCHC RBC-ENTMCNC: 31.7 PG — SIGNIFICANT CHANGE UP (ref 27–34)
MCV RBC AUTO: 102 FL — HIGH (ref 80–100)
NRBC # BLD: 0 /100 WBCS — SIGNIFICANT CHANGE UP (ref 0–0)
PLATELET # BLD AUTO: 284 K/UL — SIGNIFICANT CHANGE UP (ref 150–400)
POTASSIUM SERPL-MCNC: 5.5 MMOL/L — HIGH (ref 3.5–5.3)
POTASSIUM SERPL-SCNC: 5.5 MMOL/L — HIGH (ref 3.5–5.3)
PROTHROM AB SERPL-ACNC: 25.1 SEC — HIGH (ref 10–12.9)
RBC # BLD: 3.57 M/UL — LOW (ref 3.8–5.2)
RBC # FLD: 22.2 % — HIGH (ref 10.3–14.5)
SODIUM SERPL-SCNC: 140 MMOL/L — SIGNIFICANT CHANGE UP (ref 135–145)
SPECIMEN SOURCE: SIGNIFICANT CHANGE UP
SPECIMEN SOURCE: SIGNIFICANT CHANGE UP
WBC # BLD: 9.56 K/UL — SIGNIFICANT CHANGE UP (ref 3.8–10.5)
WBC # FLD AUTO: 9.56 K/UL — SIGNIFICANT CHANGE UP (ref 3.8–10.5)

## 2019-06-15 PROCEDURE — 71045 X-RAY EXAM CHEST 1 VIEW: CPT | Mod: 26

## 2019-06-15 PROCEDURE — 99232 SBSQ HOSP IP/OBS MODERATE 35: CPT

## 2019-06-15 PROCEDURE — 93010 ELECTROCARDIOGRAM REPORT: CPT

## 2019-06-15 PROCEDURE — 99233 SBSQ HOSP IP/OBS HIGH 50: CPT

## 2019-06-15 RX ORDER — FUROSEMIDE 40 MG
40 TABLET ORAL ONCE
Refills: 0 | Status: COMPLETED | OUTPATIENT
Start: 2019-06-15 | End: 2019-06-15

## 2019-06-15 RX ORDER — METOPROLOL TARTRATE 50 MG
25 TABLET ORAL
Refills: 0 | Status: DISCONTINUED | OUTPATIENT
Start: 2019-06-15 | End: 2019-06-20

## 2019-06-15 RX ADMIN — CEFEPIME 100 MILLIGRAM(S): 1 INJECTION, POWDER, FOR SOLUTION INTRAMUSCULAR; INTRAVENOUS at 11:34

## 2019-06-15 RX ADMIN — Medication 0.12 MILLIGRAM(S): at 04:57

## 2019-06-15 RX ADMIN — ATORVASTATIN CALCIUM 80 MILLIGRAM(S): 80 TABLET, FILM COATED ORAL at 22:01

## 2019-06-15 RX ADMIN — Medication 25 MILLIGRAM(S): at 17:12

## 2019-06-15 RX ADMIN — Medication 81 MILLIGRAM(S): at 11:34

## 2019-06-15 RX ADMIN — ENOXAPARIN SODIUM 60 MILLIGRAM(S): 100 INJECTION SUBCUTANEOUS at 11:34

## 2019-06-15 RX ADMIN — ESCITALOPRAM OXALATE 5 MILLIGRAM(S): 10 TABLET, FILM COATED ORAL at 11:34

## 2019-06-15 RX ADMIN — MIRTAZAPINE 15 MILLIGRAM(S): 45 TABLET, ORALLY DISINTEGRATING ORAL at 22:01

## 2019-06-15 RX ADMIN — Medication 40 MILLIGRAM(S): at 22:06

## 2019-06-15 NOTE — PROGRESS NOTE ADULT - SUBJECTIVE AND OBJECTIVE BOX
CC: f/u for  strept and pseudomonas bacteremia and cellulitis of the left leg  Patient reports leg feels much better  REVIEW OF SYSTEMS:  All other review of systems negative (Comprehensive ROS)    Antimicrobials Day #  :11/42  cefepime   IVPB 2000 milliGRAM(s) IV Intermittent daily       Other Medications Reviewed    Vital Signs Last 24 Hrs  T(C): 36.4 (15 Dao 2019 09:21), Max: 36.4 (14 Jun 2019 16:24)  T(F): 97.5 (15 Dao 2019 09:21), Max: 97.5 (14 Jun 2019 16:24)  HR: 71 (15 Dao 2019 09:21) (65 - 78)  BP: 114/68 (15 Dao 2019 09:21) (100/63 - 114/68)  BP(mean): --  RR: 17 (15 Dao 2019 09:21) (16 - 17)  SpO2: 100% (15 Dao 2019 09:21) (95% - 100%)    PHYSICAL EXAM:  General: alert, no acute distress  Eyes:  anicteric, no conjunctival injection, no discharge  Oropharynx: no lesions or injection 	  Neck: supple, without adenopathy  Lungs: basilar rales to auscultation  Heart: s1s2 2/6 sys m  Abdomen: soft, nondistended, nontender, without mass or organomegaly  Skin: no lesions  Extremities: left leg redness, swelling and edema markedly better  Neurologic: alert, oriented, moves all extremities    LAB RESULTS:                                         11.3   9.56  )-----------( 284      ( 15 Dao 2019 11:00 )             36.4   06-15    140  |  107  |  30<H>  ----------------------------<  118<H>  5.5<H>   |  28  |  1.37<H>    Ca    9.6      15 Dao 2019 11:00                  MICROBIOLOGY:  RECENT CULTURES:  06-09 @ 20:35 .Blood Blood     No growth to date.          RADIOLOGY REVIEWED:    < from: CT Abdomen and Pelvis w/ Oral Cont (06.11.19 @ 15:49) >  IMPRESSION:     No pneumonia.    Pulmonary edema with moderate right and small left pleural effusion.    3.0 cm hematoma in the right pectoralis muscle with hemorrhagic fluid   level.    No evidence of intra-abdominal or pelvic sepsis.    Stable masslike thickening of the sigmoid colon. Differential includes   neoplasm, colitis, or chronic diverticular disease. Colonoscopy with   biopsy is needed for a definitive diagnosis.      < end of copied text >

## 2019-06-15 NOTE — PROGRESS NOTE ADULT - SUBJECTIVE AND OBJECTIVE BOX
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 650 milliGRAM(s) Oral every 6 hours PRN Temp greater or equal to 38C (100.4F), Mild Pain (1 - 3)          Vital Signs Last 24 Hrs  T(C): 36.4 (15 Dao 2019 09:21), Max: 36.4 (14 Jun 2019 16:24)  T(F): 97.5 (15 Dao 2019 09:21), Max: 97.5 (14 Jun 2019 16:24)  HR: 71 (15 Dao 2019 09:21) (65 - 78)  BP: 114/68 (15 Dao 2019 09:21) (100/63 - 114/68)  BP(mean): --  RR: 17 (15 Dao 2019 09:21) (16 - 17)  SpO2: 100% (15 Dao 2019 09:21) (95% - 100%)    REVIEW OF SYSTEMS:  CONSTITUTIONAL: fatigue  RESPIRATORY: No cough, wheezing, chills or hemoptysis;   CARDIOVASCULAR: No chest pain or chest pressure.   No palpitations, dizziness, light headedness, syncope or near syncope.  No edema, no orthopnea.   NEUROLOGICAL: No headaches, memory loss, loss of strength, numbness, or tremors      PHYSICAL EXAM  Constitutional:  WDWN. No acute distress  HEENT: normocephalic, atraumatic.  PERRLA. EOMI  Neck : No JVD. no carotid bruits  Lungs:  clear to auscultation bilaterally. no rhonchi. no wheezing  Heart:  S1 and S2. No S3, S4. II/VI systolic murmur.  Abdomen:  soft, non tender.  Extremities: No clubbing, cyanoisis or edema  Nuerologic:  A+O x 3. No focal deficits  Skin:  no rashes        LABS:                        12.2   8.35  )-----------( 211      ( 14 Jun 2019 06:19 )             38.5     06-14    139  |  103  |  32<H>  ----------------------------<  102<H>  3.7   |  27  |  1.40<H>    Ca    9.7      14 Jun 2019 06:19    PT/INR - ( 14 Jun 2019 06:19 )   PT: 31.2 sec;   INR: 2.70 ratio                 acetaminophen   Tablet .. 650 milliGRAM(s) Oral every 6 hours PRN  aspirin enteric coated 81 milliGRAM(s) Oral daily  atorvastatin 80 milliGRAM(s) Oral at bedtime  cefepime   IVPB 2000 milliGRAM(s) IV Intermittent daily  digoxin     Tablet 0.125 milliGRAM(s) Oral daily  enoxaparin Injectable 60 milliGRAM(s) SubCutaneous daily  escitalopram 5 milliGRAM(s) Oral daily  mirtazapine 15 milliGRAM(s) Oral at bedtime    I&O's Summary    14 Jun 2019 07:01  -  15 Dao 2019 07:00  --------------------------------------------------------  IN: 0 mL / OUT: 2 mL / NET: -2 mL    < from: TTE Echo Complete w/Doppler (06.06.19 @ 11:39) >   1. Left ventricular ejection fraction, by visual estimation, is 60%.   2. Normal global left ventricular systolic function.   3. Spectral Doppler shows restrictive pattern of left ventricular   myocardial filling (Grade III diastolic dysfunction).   4. D systolic flattening consistent with severe pulmonary hypertenisn.   5. Severely enlarged right ventricle.   6. Severe tricuspid regurgitation.   7. Aortic valve is normally functioning mechanical prosthetic valve.   8. Mechanical prosthesis in the aortic valve position.   9. Moderate pulmonic valve regurgitation.  10. Estimated pulmonary artery systolic pressure is 135.6 mmHg assuming a   right atrial pressure of 15 mmHg, which is consistent with severe   pulmonary hypertension.  11. Severe pulmonary hypertension is present.  12. Moderate interatrial left to right shunt consider a PFO a patent   formaen ovale.  13. Bernoulli equation may be inaccurate in the setting of severe TR.   elevated qp/qs ratio >2 with right heart enlargement suggests a signicant   intracardiac shunt.  14. Biatrial enlargement suggests restrictive physiology.  15. Aortic doppler consistent with a mature aortic vavle prosthesis.    [id number] Scott Hernandez , Electronically signed on 6/6/2019 at 3:13:24   PM    < end of copied text >

## 2019-06-15 NOTE — PROGRESS NOTE ADULT - ASSESSMENT
89 year old woman with multiple medical problems.  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  AF, s/p remote mechanical AVR and MitraClip.  severe pulmonary HTN   Chronic renal insuffiencey chronic anticoagulation, borderline low BP      Plan  - continue digoxin  - low dose metoprolol with hold parameters for BP  - continue to follow electrolytes  - warfarin on hold for upcoming PICC  - antibiotics as per ID

## 2019-06-15 NOTE — PROGRESS NOTE ADULT - ASSESSMENT
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   bacteremias now controlled and cellulitis has resolved  no fever and no leukocytosis    Plan:    day 11/42 of Cefepime, awaiting PICC  Keep leg elevated  w/u for GI malgnancy if candidate  Goals of care to be addressed 89 yo F,  AVR, MV clip, CHF on coumadin, remote ORIF, L leg erythema, covered for cellulitis with Vanco  found to have Strep salivarius bacteremia  Chronic findings on ECHO, but with concern for endocarditis in this setting  then pseudomonas bacteremia  endovascular infection of concern.   bacteremias now controlled and cellulitis has resolved  no fever and no leukocytosis    Plan:    day 11/42 of Cefepime, awaiting PICC  Keep leg elevated  w/u for GI malgnancy if candidate  Goals of care to be addressed  d/w family at bedside

## 2019-06-15 NOTE — CHART NOTE - NSCHARTNOTEFT_GEN_A_CORE
Reviewed prior progress notes, labs and imaging.    Discussed with Dr. Russo    Primary Diagnosis: left lower extremity cellulitis, strep and pseudomonas bacteremia      Plan: day 11/42 cefepime, id following, needs picc placement        Anticipated Discharge: 24 hours - or when picc placed plan to dc home with home infusions of abx

## 2019-06-15 NOTE — PROGRESS NOTE ADULT - SUBJECTIVE AND OBJECTIVE BOX
Patient is a 89y old  Female who presents with a chief complaint of left lower extremity cellulitis (14 Jun 2019 16:38)      INTERVAL HPI/OVERNIGHT EVENTS: no complaints      REVIEW OF SYSTEMS:  CONSTITUTIONAL: No fever, weight loss, or fatigue  EYES: No eye pain, visual disturbances, or discharge  ENMT:  No difficulty hearing, tinnitus, vertigo; No sinus or throat pain  NECK: No pain or stiffness  BREASTS: No pain, masses, or nipple discharge  RESPIRATORY: No cough, wheezing, chills or hemoptysis; No shortness of breath  CARDIOVASCULAR: No chest pain, palpitations, dizziness, or leg swelling  GASTROINTESTINAL: No abdominal or epigastric pain. No nausea, vomiting, or hematemesis; No diarrhea or constipation. No melena or hematochezia.  GENITOURINARY: No dysuria, frequency, hematuria, or incontinence  NEUROLOGICAL: No headaches, memory loss, loss of strength, numbness, or tremors  SKIN: No itching, burning, rashes, or lesions   LYMPH NODES: No enlarged glands  ENDOCRINE: No heat or cold intolerance; No hair loss  MUSCULOSKELETAL: No joint pain or swelling; No muscle, back, or extremity pain  PSYCHIATRIC: No depression, anxiety, mood swings, or difficulty sleeping  HEME/LYMPH: No easy bruising, or bleeding gums  ALLERY AND IMMUNOLOGIC: No hives or eczema  FAMILY HISTORY:  No significant family history    T(C): 36.4 (06-15-19 @ 04:50), Max: 36.4 (06-14-19 @ 16:24)  HR: 70 (06-15-19 @ 04:50) (65 - 88)  BP: 114/57 (06-15-19 @ 04:50) (97/51 - 114/57)  RR: 17 (06-15-19 @ 04:50) (16 - 17)  SpO2: 95% (06-15-19 @ 04:50) (95% - 100%)  Wt(kg): --Vital Signs Last 24 Hrs  T(C): 36.4 (15 Dao 2019 04:50), Max: 36.4 (14 Jun 2019 16:24)  T(F): 97.5 (15 Dao 2019 04:50), Max: 97.5 (14 Jun 2019 16:24)  HR: 70 (15 Dao 2019 04:50) (65 - 88)  BP: 114/57 (15 Dao 2019 04:50) (97/51 - 114/57)  BP(mean): --  RR: 17 (15 Dao 2019 04:50) (16 - 17)  SpO2: 95% (15 Dao 2019 04:50) (95% - 100%)    T(F): 97.5 (06-15-19 @ 04:50), Max: 97.8 (06-13-19 @ 16:14)  HR: 70 (06-15-19 @ 04:50) (65 - 122)  BP: 114/57 (06-15-19 @ 04:50) (91/48 - 119/58)  RR: 17 (06-15-19 @ 04:50) (15 - 19)  SpO2: 95% (06-15-19 @ 04:50) (95% - 100%)    PHYSICAL EXAM:  GENERAL: NAD, well-groomed, well-developed  HEAD:  Atraumatic, Normocephalic  EYES: EOMI, PERRLA, conjunctiva and sclera clear  ENMT: No tonsillar erythema, exudates, or enlargement; Moist mucous membranes, Good dentition, No lesions  NECK: Supple, No JVD, Normal thyroid  NERVOUS SYSTEM:  Alert & Oriented X3, Good concentration; Motor Strength 5/5 B/L upper and lower extremities; DTRs 2+ intact and symmetric  CHEST/LUNG: Clear to percussion bilaterally; No rales, rhonchi, wheezing, or rubs  HEART: Regular rate and rhythm; No murmurs, rubs, or gallops  ABDOMEN: Soft, Nontender, Nondistended; Bowel sounds present  EXTREMITIES:  2+ Peripheral Pulses, No clubbing, cyanosis, or edema  LYMPH: No lymphadenopathy noted  SKIN: No rashes or lesions    Consultant(s) Notes Reviewed:  [x ] YES  [ ] NO  Care Discussed with Consultants/Other Providers [ x] YES  [ ] NO    LABS:  06-14    139  |  103  |  32<H>  ----------------------------<  102<H>  3.7   |  27  |  1.40<H>    Ca    9.7      14 Jun 2019 06:19                            12.2   8.35  )-----------( 211      ( 14 Jun 2019 06:19 )             38.5       Culture - Blood (collected 06-13-19 @ 09:45)  Source: .Blood Blood-Venous  Preliminary Report (06-14-19 @ 14:01):    No growth to date.        PT/INR - ( 14 Jun 2019 06:19 )   PT: 31.2 sec;   INR: 2.70 ratio                              12.2   8.35  )-----------( 211      ( 06-14 @ 06:19 )             38.5                10.4   10.58 )-----------( 279      ( 06-13 @ 05:10 )             33.1                10.5   8.86  )-----------( 275      ( 06-12 @ 07:14 )             32.4                11.2   10.28 )-----------( 292      ( 06-11 @ 13:35 )             34.3                12.7   12.95 )-----------( 237      ( 06-09 @ 10:50 )             39.5                11.9   10.86 )-----------( 249      ( 06-08 @ 06:23 )             37.2                11.7   9.02  )-----------( 242      ( 06-06 @ 05:05 )             35.8                11.3   9.31  )-----------( 187      ( 06-05 @ 06:55 )             34.3                13.1   10.51 )-----------( 196      ( 06-04 @ 13:15 )             39.6               RADIOLOGY & ADDITIONAL TESTS:    Imaging Personally Reviewed:  [ ] YES  [ ] NO  acetaminophen   Tablet .. 650 milliGRAM(s) Oral every 6 hours PRN  aspirin enteric coated 81 milliGRAM(s) Oral daily  atorvastatin 80 milliGRAM(s) Oral at bedtime  cefepime   IVPB 2000 milliGRAM(s) IV Intermittent daily  digoxin     Tablet 0.125 milliGRAM(s) Oral daily  enoxaparin Injectable 60 milliGRAM(s) SubCutaneous daily  escitalopram 5 milliGRAM(s) Oral daily  mirtazapine 15 milliGRAM(s) Oral at bedtime      HEALTH ISSUES - PROBLEM Dx:  Sigmoid thickening: Sigmoid thickening  Bacteremia: Bacteremia  Hypokalemia: Hypokalemia  Essential hypertension: Essential hypertension  Anxiety: Anxiety  Atrial fibrillation, unspecified type: Atrial fibrillation, unspecified type  Aortic valve disease: Aortic valve disease  Skin ulcer, unspecified ulcer stage: Skin ulcer, unspecified ulcer stage  HENRY (acute kidney injury): HENRY (acute kidney injury)  Cellulitis of left leg: Cellulitis of left leg

## 2019-06-16 LAB
ANION GAP SERPL CALC-SCNC: 8 MMOL/L — SIGNIFICANT CHANGE UP (ref 5–17)
BUN SERPL-MCNC: 36 MG/DL — HIGH (ref 7–23)
CALCIUM SERPL-MCNC: 9.2 MG/DL — SIGNIFICANT CHANGE UP (ref 8.4–10.5)
CHLORIDE SERPL-SCNC: 105 MMOL/L — SIGNIFICANT CHANGE UP (ref 96–108)
CO2 SERPL-SCNC: 26 MMOL/L — SIGNIFICANT CHANGE UP (ref 22–31)
CREAT SERPL-MCNC: 1.5 MG/DL — HIGH (ref 0.5–1.3)
GLUCOSE SERPL-MCNC: 115 MG/DL — HIGH (ref 70–99)
INR BLD: 2.04 RATIO — HIGH (ref 0.88–1.16)
POTASSIUM SERPL-MCNC: 4.2 MMOL/L — SIGNIFICANT CHANGE UP (ref 3.5–5.3)
POTASSIUM SERPL-SCNC: 4.2 MMOL/L — SIGNIFICANT CHANGE UP (ref 3.5–5.3)
PROTHROM AB SERPL-ACNC: 23.4 SEC — HIGH (ref 10–12.9)
SODIUM SERPL-SCNC: 139 MMOL/L — SIGNIFICANT CHANGE UP (ref 135–145)

## 2019-06-16 PROCEDURE — 71045 X-RAY EXAM CHEST 1 VIEW: CPT | Mod: 26

## 2019-06-16 PROCEDURE — 99232 SBSQ HOSP IP/OBS MODERATE 35: CPT

## 2019-06-16 RX ORDER — WARFARIN SODIUM 2.5 MG/1
2 TABLET ORAL ONCE
Refills: 0 | Status: COMPLETED | OUTPATIENT
Start: 2019-06-16 | End: 2019-06-16

## 2019-06-16 RX ORDER — FUROSEMIDE 40 MG
20 TABLET ORAL DAILY
Refills: 0 | Status: DISCONTINUED | OUTPATIENT
Start: 2019-06-16 | End: 2019-06-21

## 2019-06-16 RX ORDER — SODIUM CHLORIDE 9 MG/ML
10 INJECTION INTRAMUSCULAR; INTRAVENOUS; SUBCUTANEOUS
Refills: 0 | Status: DISCONTINUED | OUTPATIENT
Start: 2019-06-16 | End: 2019-06-21

## 2019-06-16 RX ADMIN — CEFEPIME 100 MILLIGRAM(S): 1 INJECTION, POWDER, FOR SOLUTION INTRAMUSCULAR; INTRAVENOUS at 12:50

## 2019-06-16 RX ADMIN — ATORVASTATIN CALCIUM 80 MILLIGRAM(S): 80 TABLET, FILM COATED ORAL at 21:59

## 2019-06-16 RX ADMIN — Medication 81 MILLIGRAM(S): at 12:37

## 2019-06-16 RX ADMIN — ENOXAPARIN SODIUM 60 MILLIGRAM(S): 100 INJECTION SUBCUTANEOUS at 12:37

## 2019-06-16 RX ADMIN — Medication 0.12 MILLIGRAM(S): at 05:22

## 2019-06-16 RX ADMIN — ESCITALOPRAM OXALATE 5 MILLIGRAM(S): 10 TABLET, FILM COATED ORAL at 12:37

## 2019-06-16 RX ADMIN — MIRTAZAPINE 15 MILLIGRAM(S): 45 TABLET, ORALLY DISINTEGRATING ORAL at 21:59

## 2019-06-16 RX ADMIN — Medication 20 MILLIGRAM(S): at 14:42

## 2019-06-16 RX ADMIN — Medication 25 MILLIGRAM(S): at 05:22

## 2019-06-16 RX ADMIN — WARFARIN SODIUM 2 MILLIGRAM(S): 2.5 TABLET ORAL at 21:59

## 2019-06-16 NOTE — PROGRESS NOTE ADULT - SUBJECTIVE AND OBJECTIVE BOX
Patient events noted;   Gannon placed for elevated  ml      MEDICATIONS  (STANDING):  aspirin enteric coated 81 milliGRAM(s) Oral daily  atorvastatin 80 milliGRAM(s) Oral at bedtime  cefepime   IVPB 2000 milliGRAM(s) IV Intermittent daily  digoxin     Tablet 0.125 milliGRAM(s) Oral daily  enoxaparin Injectable 60 milliGRAM(s) SubCutaneous daily  escitalopram 5 milliGRAM(s) Oral daily  furosemide    Tablet 20 milliGRAM(s) Oral daily  metoprolol tartrate 25 milliGRAM(s) Oral two times a day  mirtazapine 15 milliGRAM(s) Oral at bedtime    MEDICATIONS  (PRN):  acetaminophen   Tablet .. 650 milliGRAM(s) Oral every 6 hours PRN Temp greater or equal to 38C (100.4F), Mild Pain (1 - 3)  sodium chloride 0.9% lock flush 10 milliLiter(s) IV Push every 1 hour PRN Pre/post blood products, medications, blood draw, and to maintain line patency      06-15-19 @ 07:01  -  06-16-19 @ 07:00  --------------------------------------------------------  IN: 120 mL / OUT: 1000 mL / NET: -880 mL      PHYSICAL EXAM:      T(C): 36.3 (06-16-19 @ 08:45), Max: 36.6 (06-15-19 @ 20:39)  HR: 78 (06-16-19 @ 08:45) (63 - 95)  BP: 101/60 (06-16-19 @ 08:45) (101/60 - 114/59)  RR: 15 (06-16-19 @ 08:45) (15 - 17)  SpO2: 93% (06-16-19 @ 08:45) (93% - 100%)  Wt(kg): --  Respiratory: clear anteriorly, decreased BS at bases  Cardiovascular: S1 S2  Gastrointestinal: soft NT ND +BS  Extremities:   tr edema                                    11.3   9.56  )-----------( 284      ( 15 Dao 2019 11:00 )             36.4     06-16    139  |  105  |  36<H>  ----------------------------<  115<H>  4.2   |  26  |  1.50<H>    Ca    9.2      16 Jun 2019 09:03          Creatinine Trend: 1.50<--, 1.37<--, 1.40<--, 1.56<--, 1.50<--, 1.43<--  Assessment and Plan:  CKD 3; high PVR post gannon, mild hyperkalemia; ongoing diuretic rx;  Trial of void tomorrow;   Digoxin level.  Discussed with family at bedside.

## 2019-06-16 NOTE — PROGRESS NOTE ADULT - SUBJECTIVE AND OBJECTIVE BOX
Patient is a 89y old  Female who presents with a chief complaint of left lower extremity cellulitis (15 Dao 2019 15:00)      INTERVAL HPI/OVERNIGHT EVENTS: comfortable      REVIEW OF SYSTEMS:  CONSTITUTIONAL: No fever, weight loss, or fatigue  EYES: No eye pain, visual disturbances, or discharge  ENMT:  No difficulty hearing, tinnitus, vertigo; No sinus or throat pain  NECK: No pain or stiffness  BREASTS: No pain, masses, or nipple discharge  RESPIRATORY: No cough, wheezing, chills or hemoptysis; No shortness of breath  CARDIOVASCULAR: No chest pain, palpitations, dizziness, or leg swelling  GASTROINTESTINAL: No abdominal or epigastric pain. No nausea, vomiting, or hematemesis; No diarrhea or constipation. No melena or hematochezia.  GENITOURINARY: No dysuria, frequency, hematuria, or incontinence  NEUROLOGICAL: No headaches, memory loss, loss of strength, numbness, or tremors  SKIN: No itching, burning, rashes, or lesions   LYMPH NODES: No enlarged glands  ENDOCRINE: No heat or cold intolerance; No hair loss  MUSCULOSKELETAL: No joint pain or swelling; No muscle, back, or extremity pain  PSYCHIATRIC: No depression, anxiety, mood swings, or difficulty sleeping  HEME/LYMPH: No easy bruising, or bleeding gums  ALLERY AND IMMUNOLOGIC: No hives or eczema  FAMILY HISTORY:  No significant family history    T(C): 36.4 (06-16-19 @ 05:13), Max: 36.6 (06-15-19 @ 20:39)  HR: 63 (06-16-19 @ 05:13) (63 - 95)  BP: 106/59 (06-16-19 @ 05:13) (102/60 - 114/68)  RR: 17 (06-16-19 @ 05:13) (16 - 17)  SpO2: 100% (06-16-19 @ 05:13) (94% - 100%)  Wt(kg): --Vital Signs Last 24 Hrs  T(C): 36.4 (16 Jun 2019 05:13), Max: 36.6 (15 Dao 2019 20:39)  T(F): 97.6 (16 Jun 2019 05:13), Max: 97.9 (15 Dao 2019 20:39)  HR: 63 (16 Jun 2019 05:13) (63 - 95)  BP: 106/59 (16 Jun 2019 05:13) (102/60 - 114/68)  BP(mean): --  RR: 17 (16 Jun 2019 05:13) (16 - 17)  SpO2: 100% (16 Jun 2019 05:13) (94% - 100%)    T(F): 97.6 (06-16-19 @ 05:13), Max: 97.9 (06-15-19 @ 20:39)  HR: 63 (06-16-19 @ 05:13) (63 - 95)  BP: 106/59 (06-16-19 @ 05:13) (97/51 - 114/68)  RR: 17 (06-16-19 @ 05:13) (16 - 18)  SpO2: 100% (06-16-19 @ 05:13) (94% - 100%)    PHYSICAL EXAM:  GENERAL: NAD, well-groomed, well-developed  HEAD:  Atraumatic, Normocephalic  EYES: EOMI, PERRLA, conjunctiva and sclera clear  ENMT: No tonsillar erythema, exudates, or enlargement; Moist mucous membranes, Good dentition, No lesions  NECK: Supple, No JVD, Normal thyroid  NERVOUS SYSTEM:  Alert & Oriented X3, Good concentration; Motor Strength 5/5 B/L upper and lower extremities; DTRs 2+ intact and symmetric  CHEST/LUNG: Clear to percussion bilaterally; No rales, rhonchi, wheezing, or rubs  HEART: Regular rate and rhythm; No murmurs, rubs, or gallops  ABDOMEN: Soft, Nontender, Nondistended; Bowel sounds present  EXTREMITIES:  2+ Peripheral Pulses, No clubbing, cyanosis, or edema  LYMPH: No lymphadenopathy noted  SKIN: No rashes or lesions    Consultant(s) Notes Reviewed:  [x ] YES  [ ] NO  Care Discussed with Consultants/Other Providers [ x] YES  [ ] NO    LABS:  06-15    140  |  107  |  30<H>  ----------------------------<  118<H>  5.5<H>   |  28  |  1.37<H>    Ca    9.6      15 Dao 2019 11:00                            11.3   9.56  )-----------( 284      ( 15 Dao 2019 11:00 )             36.4       Culture - Blood (collected 06-13-19 @ 09:45)  Source: .Blood Blood-Venous  Preliminary Report (06-14-19 @ 14:01):    No growth to date.        PT/INR - ( 16 Jun 2019 07:15 )   PT: 23.4 sec;   INR: 2.04 ratio                              11.3   9.56  )-----------( 284      ( 06-15 @ 11:00 )             36.4                12.2   8.35  )-----------( 211      ( 06-14 @ 06:19 )             38.5                10.4   10.58 )-----------( 279      ( 06-13 @ 05:10 )             33.1                10.5   8.86  )-----------( 275      ( 06-12 @ 07:14 )             32.4                11.2   10.28 )-----------( 292      ( 06-11 @ 13:35 )             34.3                12.7   12.95 )-----------( 237      ( 06-09 @ 10:50 )             39.5                11.9   10.86 )-----------( 249      ( 06-08 @ 06:23 )             37.2                11.7   9.02  )-----------( 242      ( 06-06 @ 05:05 )             35.8                11.3   9.31  )-----------( 187      ( 06-05 @ 06:55 )             34.3                13.1   10.51 )-----------( 196      ( 06-04 @ 13:15 )             39.6               RADIOLOGY & ADDITIONAL TESTS:    Imaging Personally Reviewed:  [ ] YES  [ ] NO  acetaminophen   Tablet .. 650 milliGRAM(s) Oral every 6 hours PRN  aspirin enteric coated 81 milliGRAM(s) Oral daily  atorvastatin 80 milliGRAM(s) Oral at bedtime  cefepime   IVPB 2000 milliGRAM(s) IV Intermittent daily  digoxin     Tablet 0.125 milliGRAM(s) Oral daily  enoxaparin Injectable 60 milliGRAM(s) SubCutaneous daily  escitalopram 5 milliGRAM(s) Oral daily  metoprolol tartrate 25 milliGRAM(s) Oral two times a day  mirtazapine 15 milliGRAM(s) Oral at bedtime      HEALTH ISSUES - PROBLEM Dx:  Sigmoid thickening: Sigmoid thickening  Bacteremia: Bacteremia  Hypokalemia: Hypokalemia  Essential hypertension: Essential hypertension  Anxiety: Anxiety  Atrial fibrillation, unspecified type: Atrial fibrillation, unspecified type  Aortic valve disease: Aortic valve disease  Skin ulcer, unspecified ulcer stage: Skin ulcer, unspecified ulcer stage  HENRY (acute kidney injury): HENRY (acute kidney injury)  Cellulitis of left leg: Cellulitis of left leg

## 2019-06-16 NOTE — PROGRESS NOTE ADULT - SUBJECTIVE AND OBJECTIVE BOX
CC: f/u for  strept and pseudomonas bacteremia and cellulitis of the left leg  Patient reports leg feels much better  REVIEW OF SYSTEMS:  All other review of systems negative (Comprehensive ROS)    Antimicrobials Day #  :12/42  cefepime   IVPB 2000 milliGRAM(s) IV Intermittent daily       Other Medications Reviewed    Vital Signs Last 24 Hrs  T(C): 36.3 (16 Jun 2019 08:45), Max: 36.6 (15 Dao 2019 20:39)  T(F): 97.4 (16 Jun 2019 08:45), Max: 97.9 (15 Dao 2019 20:39)  HR: 78 (16 Jun 2019 08:45) (63 - 95)  BP: 101/60 (16 Jun 2019 08:45) (101/60 - 114/59)  BP(mean): --  RR: 15 (16 Jun 2019 08:45) (15 - 17)  SpO2: 93% (16 Jun 2019 08:45) (93% - 100%)  PHYSICAL EXAM:  General: alert, no acute distress  Eyes:  anicteric, no conjunctival injection, no discharge  Oropharynx: no lesions or injection 	  Neck: supple, without adenopathy  Lungs: basilar rales to auscultation  Heart: s1s2 2/6 sys m  Abdomen: soft, nondistended, nontender, without mass or organomegaly  Skin: no lesions  Extremities: left leg redness, swelling and edema markedly better  Neurologic: alert, oriented, moves all extremities    LAB RESULTS:                                                    11.3   9.56  )-----------( 284      ( 15 Dao 2019 11:00 )             36.4   06-16    139  |  105  |  36<H>  ----------------------------<  115<H>  4.2   |  26  |  1.50<H>    Ca    9.2      16 Jun 2019 09:03                      MICROBIOLOGY:  RECENT CULTURES:  06-09 @ 20:35 .Blood Blood     No growth to date.          RADIOLOGY REVIEWED:    < from: CT Abdomen and Pelvis w/ Oral Cont (06.11.19 @ 15:49) >  IMPRESSION:     No pneumonia.    Pulmonary edema with moderate right and small left pleural effusion.    3.0 cm hematoma in the right pectoralis muscle with hemorrhagic fluid   level.    No evidence of intra-abdominal or pelvic sepsis.    Stable masslike thickening of the sigmoid colon. Differential includes   neoplasm, colitis, or chronic diverticular disease. Colonoscopy with   biopsy is needed for a definitive diagnosis.      < end of copied text > CC: f/u for strep and pseudomonas bacteremia and cellulitis of the left leg  Patient reports leg feels much better  REVIEW OF SYSTEMS:  All other review of systems negative (Comprehensive ROS)    Antimicrobials Day #  :12/42  cefepime   IVPB 2000 milliGRAM(s) IV Intermittent daily       Other Medications Reviewed    Vital Signs Last 24 Hrs  T(C): 36.3 (16 Jun 2019 08:45), Max: 36.6 (15 Dao 2019 20:39)  T(F): 97.4 (16 Jun 2019 08:45), Max: 97.9 (15 Dao 2019 20:39)  HR: 78 (16 Jun 2019 08:45) (63 - 95)  BP: 101/60 (16 Jun 2019 08:45) (101/60 - 114/59)  BP(mean): --  RR: 15 (16 Jun 2019 08:45) (15 - 17)  SpO2: 93% (16 Jun 2019 08:45) (93% - 100%)  PHYSICAL EXAM:  General: alert, no acute distress  Eyes:  anicteric, no conjunctival injection, no discharge  Oropharynx: no lesions or injection 	  Neck: supple, without adenopathy  Lungs: basilar rales to auscultation  Heart: s1s2 2/6 sys m  Abdomen: soft, nondistended, nontender, without mass or organomegaly  Skin: no lesions  Extremities: left leg redness, swelling and edema markedly better  Neurologic: alert, oriented, moves all extremities    LAB RESULTS:                                                    11.3   9.56  )-----------( 284      ( 15 Dao 2019 11:00 )             36.4   06-16    139  |  105  |  36<H>  ----------------------------<  115<H>  4.2   |  26  |  1.50<H>    Ca    9.2      16 Jun 2019 09:03                      MICROBIOLOGY:  RECENT CULTURES:  06-09 @ 20:35 .Blood Blood     No growth to date.          RADIOLOGY REVIEWED:    < from: CT Abdomen and Pelvis w/ Oral Cont (06.11.19 @ 15:49) >  IMPRESSION:     No pneumonia.    Pulmonary edema with moderate right and small left pleural effusion.    3.0 cm hematoma in the right pectoralis muscle with hemorrhagic fluid   level.    No evidence of intra-abdominal or pelvic sepsis.    Stable masslike thickening of the sigmoid colon. Differential includes   neoplasm, colitis, or chronic diverticular disease. Colonoscopy with   biopsy is needed for a definitive diagnosis.      < end of copied text >

## 2019-06-16 NOTE — PROGRESS NOTE ADULT - ASSESSMENT
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   bacteremias now controlled and cellulitis has resolved  no fever and no leukocytosis    Plan:    day 12/42 of Cefepime, awaiting PICC  Keep leg elevated  w/u for GI malgnancy if candidate  Goals of care to be addressed  d/w family at bedside

## 2019-06-16 NOTE — CHART NOTE - NSCHARTNOTEFT_GEN_A_CORE
Called by RN overnight sec to tachypnea.  Pt stated she has been having SOB and does not feel this is any worse than usual. Denied CP, palps, nausea, diaphoresis. She feels well otherwise despite noticeably tachypneic.  PE" /79, RR 45, sat 99 2L P: 95  tachypneic but not in distress  CV: irreg +murmur  CL: decreased BS at R base worse than L. rales to midlung.  +edema.   AP: 89F hx of afib, AVR, CHF with strep and klebsiella bacteremia and cellulitis  with tachypnea.   EKG unchanged.   CXR ordered -- worsening CHF and bl pleural effusions.   IV Lasix 40mg x 1 ordered. (Lasix was recently held sec to HENRY) Subsequently pt c/o urinary retention - Owusu placed with 500cc output. overnight ~1L output.

## 2019-06-16 NOTE — PROCEDURE NOTE - NSPROCDETAILS_GEN_ALL_CORE
sterile technique, catheter placed/ultrasound assessment/sterile dressing applied/location identified, draped/prepped, sterile technique used/ultrasound guidance

## 2019-06-16 NOTE — PROGRESS NOTE ADULT - SUBJECTIVE AND OBJECTIVE BOX
SUBJ:  Patient is a 89y old  Female who presents with a chief complaint of left lower extremity cellulitis (16 Jun 2019 07:54)  patient seen and examined  she had shortness of breath last night... 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 650 milliGRAM(s) Oral every 6 hours PRN Temp greater or equal to 38C (100.4F), Mild Pain (1 - 3)          Vital Signs Last 24 Hrs  T(C): 36.3 (16 Jun 2019 08:45), Max: 36.6 (15 Dao 2019 20:39)  T(F): 97.4 (16 Jun 2019 08:45), Max: 97.9 (15 Dao 2019 20:39)  HR: 78 (16 Jun 2019 08:45) (63 - 95)  BP: 101/60 (16 Jun 2019 08:45) (101/60 - 114/59)  BP(mean): --  RR: 15 (16 Jun 2019 08:45) (15 - 17)  SpO2: 93% (16 Jun 2019 08:45) (93% - 100%)    REVIEW OF SYSTEMS:  CONSTITUTIONAL: No fever, weight loss, or fatigue  RESPIRATORY: No cough, wheezing, chills or hemoptysis; No shortness of breath  CARDIOVASCULAR: No chest pain or chest pressure.  No shortness of breath or dyspnea on exertion.  No palpitations, dizziness, light headedness, syncope or near syncope.  No edema, no orthopnea.   NEUROLOGICAL: No headaches, memory loss, loss of strength, numbness, or tremors      PHYSICAL EXAM  Constitutional:  frail ill-appearing woman   HEENT: normocephalic, atraumatic.  PERRLA. EOMI  Neck : No JVD. no carotid bruits  Lungs:  decreased breath sounds bilateral   Heart:  S1 and S2. No S3, S4. II/VI systolic murmur.  Abdomen:  soft, non tender.  Extremities: No clubbing, cyanoisis or edema  Nuerologic:  A+O x 3. No focal deficits  Skin:  no rashes        LABS:                        11.3   9.56  )-----------( 284      ( 15 Dao 2019 11:00 )             36.4     06-15    140  |  107  |  30<H>  ----------------------------<  118<H>  5.5<H>   |  28  |  1.37<H>    Ca    9.6      15 Dao 2019 11:00              acetaminophen   Tablet .. 650 milliGRAM(s) Oral every 6 hours PRN  aspirin enteric coated 81 milliGRAM(s) Oral daily  atorvastatin 80 milliGRAM(s) Oral at bedtime  cefepime   IVPB 2000 milliGRAM(s) IV Intermittent daily  digoxin     Tablet 0.125 milliGRAM(s) Oral daily  enoxaparin Injectable 60 milliGRAM(s) SubCutaneous daily  escitalopram 5 milliGRAM(s) Oral daily  metoprolol tartrate 25 milliGRAM(s) Oral two times a day  mirtazapine 15 milliGRAM(s) Oral at bedtime    I&O's Summary    15 Dao 2019 07:01  -  16 Jun 2019 07:00  --------------------------------------------------------  IN: 120 mL / OUT: 1000 mL / NET: -880 mL    < from: CT Chest No Cont (06.11.19 @ 15:49) >  No pneumonia.    Pulmonary edema with moderate right and small left pleural effusion.    3.0 cm hematoma in the right pectoralis muscle with hemorrhagic fluid   level.    No evidence of intra-abdominal or pelvic sepsis.    Stable masslike thickening of the sigmoid colon. Differential includes   neoplasm, colitis, or chronic diverticular disease. Colonoscopy with   biopsy is needed for a definitive diagnosis.    < end of copied text >    tele - AF    < from: TTE Echo Complete w/Doppler (06.06.19 @ 11:39) >  1. Left ventricular ejection fraction, by visual estimation, is 60%.   2. Normal global left ventricular systolic function.   3. Spectral Doppler shows restrictive pattern of left ventricular   myocardial filling (Grade III diastolic dysfunction).   4. D systolic flattening consistent with severe pulmonary hypertenisn.   5. Severely enlarged right ventricle.   6. Severe tricuspid regurgitation.   7. Aortic valve is normally functioning mechanical prosthetic valve.   8. Mechanical prosthesis in the aortic valve position.   9. Moderate pulmonic valve regurgitation.  10. Estimated pulmonary artery systolic pressure is 135.6 mmHg assuming a   right atrial pressure of 15 mmHg, which is consistent with severe   pulmonary hypertension.  11. Severe pulmonary hypertension is present.    < end of copied text >

## 2019-06-17 LAB
ANION GAP SERPL CALC-SCNC: 9 MMOL/L — SIGNIFICANT CHANGE UP (ref 5–17)
BUN SERPL-MCNC: 39 MG/DL — HIGH (ref 7–23)
CALCIUM SERPL-MCNC: 9.2 MG/DL — SIGNIFICANT CHANGE UP (ref 8.4–10.5)
CHLORIDE SERPL-SCNC: 105 MMOL/L — SIGNIFICANT CHANGE UP (ref 96–108)
CO2 SERPL-SCNC: 25 MMOL/L — SIGNIFICANT CHANGE UP (ref 22–31)
CREAT SERPL-MCNC: 1.46 MG/DL — HIGH (ref 0.5–1.3)
DIGOXIN SERPL-MCNC: 1.4 NG/ML — SIGNIFICANT CHANGE UP (ref 0.8–2)
GLUCOSE SERPL-MCNC: 94 MG/DL — SIGNIFICANT CHANGE UP (ref 70–99)
INR BLD: 1.72 RATIO — HIGH (ref 0.88–1.16)
POTASSIUM SERPL-MCNC: 3.7 MMOL/L — SIGNIFICANT CHANGE UP (ref 3.5–5.3)
POTASSIUM SERPL-SCNC: 3.7 MMOL/L — SIGNIFICANT CHANGE UP (ref 3.5–5.3)
PROTHROM AB SERPL-ACNC: 19.6 SEC — HIGH (ref 10–12.9)
SODIUM SERPL-SCNC: 139 MMOL/L — SIGNIFICANT CHANGE UP (ref 135–145)

## 2019-06-17 PROCEDURE — 99232 SBSQ HOSP IP/OBS MODERATE 35: CPT

## 2019-06-17 RX ORDER — LANOLIN ALCOHOL/MO/W.PET/CERES
3 CREAM (GRAM) TOPICAL ONCE
Refills: 0 | Status: COMPLETED | OUTPATIENT
Start: 2019-06-17 | End: 2019-06-17

## 2019-06-17 RX ORDER — WARFARIN SODIUM 2.5 MG/1
3 TABLET ORAL ONCE
Refills: 0 | Status: COMPLETED | OUTPATIENT
Start: 2019-06-17 | End: 2019-06-17

## 2019-06-17 RX ADMIN — ATORVASTATIN CALCIUM 80 MILLIGRAM(S): 80 TABLET, FILM COATED ORAL at 21:01

## 2019-06-17 RX ADMIN — ESCITALOPRAM OXALATE 5 MILLIGRAM(S): 10 TABLET, FILM COATED ORAL at 12:21

## 2019-06-17 RX ADMIN — Medication 650 MILLIGRAM(S): at 06:46

## 2019-06-17 RX ADMIN — Medication 25 MILLIGRAM(S): at 17:35

## 2019-06-17 RX ADMIN — Medication 650 MILLIGRAM(S): at 06:03

## 2019-06-17 RX ADMIN — CEFEPIME 100 MILLIGRAM(S): 1 INJECTION, POWDER, FOR SOLUTION INTRAMUSCULAR; INTRAVENOUS at 12:20

## 2019-06-17 RX ADMIN — Medication 81 MILLIGRAM(S): at 12:21

## 2019-06-17 RX ADMIN — MIRTAZAPINE 15 MILLIGRAM(S): 45 TABLET, ORALLY DISINTEGRATING ORAL at 21:01

## 2019-06-17 RX ADMIN — Medication 0.12 MILLIGRAM(S): at 06:03

## 2019-06-17 RX ADMIN — WARFARIN SODIUM 3 MILLIGRAM(S): 2.5 TABLET ORAL at 21:01

## 2019-06-17 RX ADMIN — Medication 20 MILLIGRAM(S): at 06:03

## 2019-06-17 NOTE — PROGRESS NOTE ADULT - SUBJECTIVE AND OBJECTIVE BOX
Follow up for af valvular disease  SUBJ: patient feeling fairly well no cp no sob   PMH  Aortic valve disease  Non-rheumatic mitral regurgitation  HLD (hyperlipidemia)  Melanoma  Diverticulosis of small intestine without hemorrhage  Afib  Anxiety  Essential hypertension      MEDICATIONS  (STANDING):  aspirin enteric coated 81 milliGRAM(s) Oral daily  atorvastatin 80 milliGRAM(s) Oral at bedtime  cefepime   IVPB 2000 milliGRAM(s) IV Intermittent daily  digoxin     Tablet 0.125 milliGRAM(s) Oral daily  escitalopram 5 milliGRAM(s) Oral daily  furosemide    Tablet 20 milliGRAM(s) Oral daily  metoprolol tartrate 25 milliGRAM(s) Oral two times a day  mirtazapine 15 milliGRAM(s) Oral at bedtime  warfarin 3 milliGRAM(s) Oral once    MEDICATIONS  (PRN):  acetaminophen   Tablet .. 650 milliGRAM(s) Oral every 6 hours PRN Temp greater or equal to 38C (100.4F), Mild Pain (1 - 3)  sodium chloride 0.9% lock flush 10 milliLiter(s) IV Push every 1 hour PRN Pre/post blood products, medications, blood draw, and to maintain line patency        PHYSICAL EXAM:  Vital Signs Last 24 Hrs  T(C): 36.4 (17 Jun 2019 16:20), Max: 36.4 (16 Jun 2019 21:10)  T(F): 97.5 (17 Jun 2019 16:20), Max: 97.6 (16 Jun 2019 21:10)  HR: 68 (17 Jun 2019 16:20) (54 - 77)  BP: 100/57 (17 Jun 2019 16:20) (100/48 - 107/52)  BP(mean): --  RR: 15 (17 Jun 2019 16:20) (15 - 15)  SpO2: 98% (17 Jun 2019 16:20) (93% - 100%)    GENERAL: NAD, well-groomed, well-developed  HEAD:  Atraumatic, Normocephalic  EYES: EOMI, PERRLA, conjunctiva and sclera clear  ENT: Moist mucous membranes,  NECK: Supple, No JVD, no bruits  CHEST/LUNG: Clear to percussion bilaterally; No rales, rhonchi, wheezing, or rubs  HEART: irregularly irregular  ABDOMEN: Soft, Nontender, Nondistended; Bowel sounds present  EXTREMITIES:  2+ Peripheral Pulses, No clubbing, cyanosis, or edema  SKIN: No rashes or lesions  NERVOUS SYSTEM:  Alert & Oriented X3, Good concentration; Motor Strength 5/5 B/L upper and lower extremities; DTRs 2+ intact and symmetric      TELEMETRY: af    ECG:    LABS:    06-17    139  |  105  |  39<H>  ----------------------------<  94  3.7   |  25  |  1.46<H>    Ca    9.2      17 Jun 2019 06:25          PT/INR - ( 17 Jun 2019 06:25 )   PT: 19.6 sec;   INR: 1.72 ratio             I&O's Summary    16 Jun 2019 07:01  -  17 Jun 2019 07:00  --------------------------------------------------------  IN: 520 mL / OUT: 650 mL / NET: -130 mL    17 Jun 2019 07:01  -  17 Jun 2019 18:42  --------------------------------------------------------  IN: 300 mL / OUT: 100 mL / NET: 200 mL      BNP    RADIOLOGY & ADDITIONAL STUDIES:    ECHO:

## 2019-06-17 NOTE — PROGRESS NOTE ADULT - ASSESSMENT
Imp  ELDERLY WOMAN WITH PRESUMED IE. FELT THAT MEDICAL MANAGEMENT BEST OPTION    NO NEW CARDIAC  RECOMMENDATIONS

## 2019-06-17 NOTE — CHART NOTE - NSCHARTNOTEFT_GEN_A_CORE
Nutrition Follow Up Note  Hospital Course (Per Electronic Medical Record):   Source: Medical Record [X] Patient [X]  Nursing Staff [X]     Diet: Low Fiber, DASH/TLC Ensure Enlive QD    Patient consuming 50-75% of meals , NO GI distress noted. Encourage consumption of po nutrition due to evidence of moderate malnutrition.      Current Weight: (6/17) 148.8/67.5kg, stable weight since admission.    Pertinent Medications: MEDICATIONS  (STANDING):  aspirin enteric coated 81 milliGRAM(s) Oral daily  atorvastatin 80 milliGRAM(s) Oral at bedtime  cefepime   IVPB 2000 milliGRAM(s) IV Intermittent daily  digoxin     Tablet 0.125 milliGRAM(s) Oral daily  escitalopram 5 milliGRAM(s) Oral daily  furosemide    Tablet 20 milliGRAM(s) Oral daily  metoprolol tartrate 25 milliGRAM(s) Oral two times a day  mirtazapine 15 milliGRAM(s) Oral at bedtime  warfarin 3 milliGRAM(s) Oral once    MEDICATIONS  (PRN):  acetaminophen   Tablet .. 650 milliGRAM(s) Oral every 6 hours PRN Temp greater or equal to 38C (100.4F), Mild Pain (1 - 3)  sodium chloride 0.9% lock flush 10 milliLiter(s) IV Push every 1 hour PRN Pre/post blood products, medications, blood draw, and to maintain line patency      Pertinent Labs:  06-17 Na139 mmol/L Glu 94 mg/dL K+ 3.7 mmol/L Cr  1.46 mg/dL<H> BUN 39 mg/dL<H>        Skin: Intact    Edema: (+1) RLE, (+2) LLE    Last BM: on (6/17)    Estimated Needs:   [X] No Change since Previous Assessment  [X] Recalculated:     Previous Nutrition Diagnosis: Moderate Malnutrition    Nutrition Diagnosis is [X] Ongoing         New Nutrition Diagnosis: [X] Not Applicable    Interventions:   1. Recommend continue current diet       Monitoring & Evaluation: will monitor:  [X] Weights   [X] PO Intake   [X] Follow Up (Per Protocol)  [X] Tolerance to Diet Prescription       RD to follow as per Nutrition protocol  Mary Ann Smith RDN

## 2019-06-17 NOTE — PROGRESS NOTE ADULT - ASSESSMENT
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   bacteremias now controlled and cellulitis has resolved  no fever and no leukocytosis    Plan:    day 13/42 of Cefepime,   Keep leg elevated for edema  w/u for GI malgnancy if candidate  d/w family at bedside, they wish her to complete therapy at home  Weekly CBC,Diff and CMP when discharged 89 yo F,  AVR, MV clip, CHF on coumadin, remote ORIF, L leg erythema, covered for cellulitis with Vanco  found to have Strep salivarius bacteremia  Chronic findings on ECHO, but with concern for endocarditis in this setting  then pseudomonas bacteremia  endovascular infection of concern.   bacteremias now controlled and cellulitis has resolved  No fever and no leukocytosis  Anticoagulation per medicine  Trial of void with history of elevated PVR per medicine  Plan:    day 13/42 of Cefepime,   Keep leg elevated for edema  w/u for GI malgnancy if candidate  d/w family at bedside, they wish her to complete therapy at home  Weekly CBC,Diff and CMP when discharged

## 2019-06-17 NOTE — CHART NOTE - NSCHARTNOTEFT_GEN_A_CORE
Reviewed prior progress notes, labs and imaging.    Discussed with Dr. Russo    Primary Diagnosis: left lower extremity cellulitis- concern for endocardiitis      Plan:   #Cellulitis: found to have Strep salivarius bacteremia  Chronic findings on ECHO, but with concern for endocarditis in this setting  ID following  On Day 13/42 cefepime--PICC line placed    #AfIB:  INR 1.72  Dose coumadin  Repeat INR tomorrow  Cont low dose metop and dig  Dig level this AM therapeutic     #CKD 3:  high PVR post gannon,  TO remove gannon today and TOV  Renal following  Monitor BMP        Anticipated Discharge: home with IB abx via PICC--probable within 48 hrs   Code Status: pall care met with son today.  MOLST to be filled out w son    Plan d/w DR Russo

## 2019-06-17 NOTE — PROGRESS NOTE ADULT - SUBJECTIVE AND OBJECTIVE BOX
Patient is a 89y old  Female who presents with a chief complaint of left lower extremity cellulitis (16 Jun 2019 13:22)      INTERVAL HPI/OVERNIGHT EVENTS: no complaints      REVIEW OF SYSTEMS:  CONSTITUTIONAL: No fever, weight loss, or fatigue  EYES: No eye pain, visual disturbances, or discharge  ENMT:  No difficulty hearing, tinnitus, vertigo; No sinus or throat pain  NECK: No pain or stiffness  BREASTS: No pain, masses, or nipple discharge  RESPIRATORY: No cough, wheezing, chills or hemoptysis; No shortness of breath  CARDIOVASCULAR: No chest pain, palpitations, dizziness, or leg swelling  GASTROINTESTINAL: No abdominal or epigastric pain. No nausea, vomiting, or hematemesis; No diarrhea or constipation. No melena or hematochezia.  GENITOURINARY: No dysuria, frequency, hematuria, or incontinence  NEUROLOGICAL: No headaches, memory loss, loss of strength, numbness, or tremors  SKIN: No itching, burning, rashes, or lesions   LYMPH NODES: No enlarged glands  ENDOCRINE: No heat or cold intolerance; No hair loss  MUSCULOSKELETAL: No joint pain or swelling; No muscle, back, or extremity pain  PSYCHIATRIC: No depression, anxiety, mood swings, or difficulty sleeping  HEME/LYMPH: No easy bruising, or bleeding gums  ALLERY AND IMMUNOLOGIC: No hives or eczema  FAMILY HISTORY:  No significant family history    T(C): 36.2 (06-17-19 @ 05:07), Max: 36.7 (06-16-19 @ 13:20)  HR: 54 (06-17-19 @ 05:27) (54 - 89)  BP: 107/52 (06-17-19 @ 05:27) (96/56 - 107/52)  RR: 15 (06-17-19 @ 05:07) (15 - 16)  SpO2: 100% (06-17-19 @ 05:07) (93% - 100%)  Wt(kg): --Vital Signs Last 24 Hrs  T(C): 36.2 (17 Jun 2019 05:07), Max: 36.7 (16 Jun 2019 13:20)  T(F): 97.2 (17 Jun 2019 05:07), Max: 98 (16 Jun 2019 13:20)  HR: 54 (17 Jun 2019 05:27) (54 - 89)  BP: 107/52 (17 Jun 2019 05:27) (96/56 - 107/52)  BP(mean): --  RR: 15 (17 Jun 2019 05:07) (15 - 16)  SpO2: 100% (17 Jun 2019 05:07) (93% - 100%)    T(F): 97.2 (06-17-19 @ 05:07), Max: 98 (06-16-19 @ 13:20)  HR: 54 (06-17-19 @ 05:27) (54 - 95)  BP: 107/52 (06-17-19 @ 05:27) (96/56 - 114/68)  RR: 15 (06-17-19 @ 05:07) (15 - 17)  SpO2: 100% (06-17-19 @ 05:07) (93% - 100%)    PHYSICAL EXAM:  GENERAL: NAD, well-groomed, well-developed  HEAD:  Atraumatic, Normocephalic  EYES: EOMI, PERRLA, conjunctiva and sclera clear  ENMT: No tonsillar erythema, exudates, or enlargement; Moist mucous membranes, Good dentition, No lesions  NECK: Supple, No JVD, Normal thyroid  NERVOUS SYSTEM:  Alert & Oriented X3, Good concentration; Motor Strength 5/5 B/L upper and lower extremities; DTRs 2+ intact and symmetric  CHEST/LUNG: Clear to percussion bilaterally; No rales, rhonchi, wheezing, or rubs  HEART: Regular rate and rhythm; No murmurs, rubs, or gallops  ABDOMEN: Soft, Nontender, Nondistended; Bowel sounds present  EXTREMITIES:  2+ Peripheral Pulses, No clubbing, cyanosis, or edema  LYMPH: No lymphadenopathy noted  SKIN: No rashes or lesions    Consultant(s) Notes Reviewed:  [x ] YES  [ ] NO  Care Discussed with Consultants/Other Providers [ x] YES  [ ] NO    LABS:  06-16    139  |  105  |  36<H>  ----------------------------<  115<H>  4.2   |  26  |  1.50<H>    Ca    9.2      16 Jun 2019 09:03                            11.3   9.56  )-----------( 284      ( 15 Dao 2019 11:00 )             36.4         PT/INR - ( 16 Jun 2019 07:15 )   PT: 23.4 sec;   INR: 2.04 ratio                              11.3   9.56  )-----------( 284      ( 06-15 @ 11:00 )             36.4                12.2   8.35  )-----------( 211      ( 06-14 @ 06:19 )             38.5                10.4   10.58 )-----------( 279      ( 06-13 @ 05:10 )             33.1                10.5   8.86  )-----------( 275      ( 06-12 @ 07:14 )             32.4                11.2   10.28 )-----------( 292      ( 06-11 @ 13:35 )             34.3                12.7   12.95 )-----------( 237      ( 06-09 @ 10:50 )             39.5                11.9   10.86 )-----------( 249      ( 06-08 @ 06:23 )             37.2                11.7   9.02  )-----------( 242      ( 06-06 @ 05:05 )             35.8                11.3   9.31  )-----------( 187      ( 06-05 @ 06:55 )             34.3                13.1   10.51 )-----------( 196      ( 06-04 @ 13:15 )             39.6               RADIOLOGY & ADDITIONAL TESTS:    Imaging Personally Reviewed:  [ ] YES  [ ] NO  acetaminophen   Tablet .. 650 milliGRAM(s) Oral every 6 hours PRN  aspirin enteric coated 81 milliGRAM(s) Oral daily  atorvastatin 80 milliGRAM(s) Oral at bedtime  cefepime   IVPB 2000 milliGRAM(s) IV Intermittent daily  digoxin     Tablet 0.125 milliGRAM(s) Oral daily  enoxaparin Injectable 60 milliGRAM(s) SubCutaneous daily  escitalopram 5 milliGRAM(s) Oral daily  furosemide    Tablet 20 milliGRAM(s) Oral daily  metoprolol tartrate 25 milliGRAM(s) Oral two times a day  mirtazapine 15 milliGRAM(s) Oral at bedtime  sodium chloride 0.9% lock flush 10 milliLiter(s) IV Push every 1 hour PRN      HEALTH ISSUES - PROBLEM Dx:  Sigmoid thickening: Sigmoid thickening  Bacteremia: Bacteremia  Hypokalemia: Hypokalemia  Essential hypertension: Essential hypertension  Anxiety: Anxiety  Atrial fibrillation, unspecified type: Atrial fibrillation, unspecified type  Aortic valve disease: Aortic valve disease  Skin ulcer, unspecified ulcer stage: Skin ulcer, unspecified ulcer stage  HENRY (acute kidney injury): HENRY (acute kidney injury)  Cellulitis of left leg: Cellulitis of left leg

## 2019-06-17 NOTE — PROGRESS NOTE ADULT - SUBJECTIVE AND OBJECTIVE BOX
Resting    Vital Signs Last 24 Hrs  T(C): 36.4 (06-17-19 @ 19:56), Max: 36.4 (06-16-19 @ 21:10)  T(F): 97.6 (06-17-19 @ 19:56), Max: 97.6 (06-16-19 @ 21:10)  HR: 61 (06-17-19 @ 19:56) (54 - 77)  BP: 109/52 (06-17-19 @ 19:56) (100/48 - 109/52)  RR: 15 (06-17-19 @ 19:56) (15 - 15)  SpO2: 98% (06-17-19 @ 19:56) (93% - 100%)    Card S1S2  Lungs fair air entry b/l  Abd soft, NT, ND  Extr + edema, erythema LLE                                                               17 Jun 2019 06:25    139    |  105    |  39     ----------------------------<  94     3.7     |  25     |  1.46     Ca    9.2        17 Jun 2019 06:25    PT/INR - ( 17 Jun 2019 06:25 )   PT: 19.6 sec;   INR: 1.72 ratio      acetaminophen   Tablet .. 650 milliGRAM(s) Oral every 6 hours PRN  aspirin enteric coated 81 milliGRAM(s) Oral daily  atorvastatin 80 milliGRAM(s) Oral at bedtime  cefepime   IVPB 2000 milliGRAM(s) IV Intermittent daily  digoxin     Tablet 0.125 milliGRAM(s) Oral daily  escitalopram 5 milliGRAM(s) Oral daily  furosemide    Tablet 20 milliGRAM(s) Oral daily  metoprolol tartrate 25 milliGRAM(s) Oral two times a day  mirtazapine 15 milliGRAM(s) Oral at bedtime  sodium chloride 0.9% lock flush 10 milliLiter(s) IV Push every 1 hour PRN  warfarin 3 milliGRAM(s) Oral once    A/P:    Septic/hemodynamic HENRY on CKD 3  Improved  No nephrotoxins  F/u BMP   Avoid hypotension   Encourage PO fluids  Strep bacteremia  Abx per ID  F/u renal fx on Lasix

## 2019-06-17 NOTE — PROGRESS NOTE ADULT - SUBJECTIVE AND OBJECTIVE BOX
CC: f/u for polymicrobic bacteremia    Patient reports: no acute complaints,she is afebrile, tolerating diet, off ers no new complaints    REVIEW OF SYSTEMS:  All other review of systems negative (Comprehensive ROS)    Antimicrobials Day #  :day 13/42  cefepime   IVPB 2000 milliGRAM(s) IV Intermittent daily    Other Medications Reviewed    T(F): 97.4 (06-17-19 @ 09:14), Max: 97.6 (06-16-19 @ 21:10)  HR: 77 (06-17-19 @ 09:14)  BP: 104/48 (06-17-19 @ 09:14)  RR: 15 (06-17-19 @ 09:14)  SpO2: 93% (06-17-19 @ 09:14)  Wt(kg): --    PHYSICAL EXAM:  General: alert, no acute distress  Eyes:  anicteric, no conjunctival injection, no discharge  Oropharynx: no lesions or injection 	  Neck: supple, without adenopathy  Lungs: clear to auscultation, diminished at bases  Heart: regular rate and rhythm;+harvey  Abdomen: soft, nondistended, nontender, without mass or organomegaly  Skin: trace edema  Extremities: no clubbing, cyanosis, or edema  Neurologic: alert, oriented, moves all extremities    LAB RESULTS:    06-17    139  |  105  |  39<H>  ----------------------------<  94  3.7   |  25  |  1.46<H>    Ca    9.2      17 Jun 2019 06:25          MICROBIOLOGY:  RECENT CULTURES:  06-13 @ 09:45 .Blood Blood-Venous     No growth to date.          RADIOLOGY REVIEWED:    < from: Xray Chest 1 View- PORTABLE-Urgent (06.15.19 @ 21:54) >  IMPRESSION:  Severe pulmonary vascular congestion and pulmonary edema. Superimposed   pneumonia cannot be excluded.    Mild to moderate bilateral pleural effusions. CC: f/u for polymicrobic bacteremia    Patient reports: no acute complaints,she is afebrile, tolerating diet, off ers no new complaints    REVIEW OF SYSTEMS:  All other review of systems negative (Comprehensive ROS)    Antimicrobials Day #  :day 13/42  cefepime   IVPB 2000 milliGRAM(s) IV Intermittent daily    Other Medications Reviewed    T(F): 97.4 (06-17-19 @ 09:14), Max: 97.6 (06-16-19 @ 21:10)  HR: 77 (06-17-19 @ 09:14)  BP: 104/48 (06-17-19 @ 09:14)  RR: 15 (06-17-19 @ 09:14)  SpO2: 93% (06-17-19 @ 09:14)  Wt(kg): --    PHYSICAL EXAM:  General: alert, no acute distress  Eyes:  anicteric, no conjunctival injection, no discharge  Oropharynx: no lesions or injection 	  Neck: supple, without adenopathy  Lungs: clear to auscultation, diminished at bases  Heart: irregular rate and rhythm;+harvey  Abdomen: soft, nondistended, nontender, without mass or organomegaly  Skin: trace edema  Extremities: no clubbing, cyanosis, or edema  Neurologic: alert, oriented, moves all extremities    LAB RESULTS:    06-17    139  |  105  |  39<H>  ----------------------------<  94  3.7   |  25  |  1.46<H>    Ca    9.2      17 Jun 2019 06:25          MICROBIOLOGY:  RECENT CULTURES:  06-13 @ 09:45 .Blood Blood-Venous     No growth to date.          RADIOLOGY REVIEWED:    < from: Xray Chest 1 View- PORTABLE-Urgent (06.15.19 @ 21:54) >  IMPRESSION:  Severe pulmonary vascular congestion and pulmonary edema. Superimposed   pneumonia cannot be excluded.    Mild to moderate bilateral pleural effusions.

## 2019-06-18 LAB
ANION GAP SERPL CALC-SCNC: 9 MMOL/L — SIGNIFICANT CHANGE UP (ref 5–17)
BUN SERPL-MCNC: 40 MG/DL — HIGH (ref 7–23)
CALCIUM SERPL-MCNC: 9.3 MG/DL — SIGNIFICANT CHANGE UP (ref 8.4–10.5)
CHLORIDE SERPL-SCNC: 107 MMOL/L — SIGNIFICANT CHANGE UP (ref 96–108)
CO2 SERPL-SCNC: 24 MMOL/L — SIGNIFICANT CHANGE UP (ref 22–31)
CREAT SERPL-MCNC: 1.46 MG/DL — HIGH (ref 0.5–1.3)
CULTURE RESULTS: SIGNIFICANT CHANGE UP
GLUCOSE SERPL-MCNC: 94 MG/DL — SIGNIFICANT CHANGE UP (ref 70–99)
HCT VFR BLD CALC: 33.6 % — LOW (ref 34.5–45)
HGB BLD-MCNC: 10.5 G/DL — LOW (ref 11.5–15.5)
INR BLD: 1.75 RATIO — HIGH (ref 0.88–1.16)
MCHC RBC-ENTMCNC: 31.2 PG — SIGNIFICANT CHANGE UP (ref 27–34)
MCHC RBC-ENTMCNC: 31.3 GM/DL — LOW (ref 32–36)
MCV RBC AUTO: 99.7 FL — SIGNIFICANT CHANGE UP (ref 80–100)
NRBC # BLD: 0 /100 WBCS — SIGNIFICANT CHANGE UP (ref 0–0)
PLATELET # BLD AUTO: 249 K/UL — SIGNIFICANT CHANGE UP (ref 150–400)
POTASSIUM SERPL-MCNC: 3.8 MMOL/L — SIGNIFICANT CHANGE UP (ref 3.5–5.3)
POTASSIUM SERPL-SCNC: 3.8 MMOL/L — SIGNIFICANT CHANGE UP (ref 3.5–5.3)
PROTHROM AB SERPL-ACNC: 19.9 SEC — HIGH (ref 10–12.9)
RBC # BLD: 3.37 M/UL — LOW (ref 3.8–5.2)
RBC # FLD: 22.6 % — HIGH (ref 10.3–14.5)
SODIUM SERPL-SCNC: 140 MMOL/L — SIGNIFICANT CHANGE UP (ref 135–145)
SPECIMEN SOURCE: SIGNIFICANT CHANGE UP
WBC # BLD: 8.66 K/UL — SIGNIFICANT CHANGE UP (ref 3.8–10.5)
WBC # FLD AUTO: 8.66 K/UL — SIGNIFICANT CHANGE UP (ref 3.8–10.5)

## 2019-06-18 PROCEDURE — 99232 SBSQ HOSP IP/OBS MODERATE 35: CPT

## 2019-06-18 RX ORDER — WARFARIN SODIUM 2.5 MG/1
4 TABLET ORAL ONCE
Refills: 0 | Status: COMPLETED | OUTPATIENT
Start: 2019-06-18 | End: 2019-06-18

## 2019-06-18 RX ADMIN — CEFEPIME 100 MILLIGRAM(S): 1 INJECTION, POWDER, FOR SOLUTION INTRAMUSCULAR; INTRAVENOUS at 11:26

## 2019-06-18 RX ADMIN — MIRTAZAPINE 15 MILLIGRAM(S): 45 TABLET, ORALLY DISINTEGRATING ORAL at 21:27

## 2019-06-18 RX ADMIN — Medication 20 MILLIGRAM(S): at 05:35

## 2019-06-18 RX ADMIN — WARFARIN SODIUM 4 MILLIGRAM(S): 2.5 TABLET ORAL at 21:26

## 2019-06-18 RX ADMIN — ESCITALOPRAM OXALATE 5 MILLIGRAM(S): 10 TABLET, FILM COATED ORAL at 11:26

## 2019-06-18 RX ADMIN — Medication 81 MILLIGRAM(S): at 11:26

## 2019-06-18 RX ADMIN — ATORVASTATIN CALCIUM 80 MILLIGRAM(S): 80 TABLET, FILM COATED ORAL at 21:27

## 2019-06-18 RX ADMIN — Medication 25 MILLIGRAM(S): at 05:35

## 2019-06-18 RX ADMIN — Medication 0.12 MILLIGRAM(S): at 05:35

## 2019-06-18 NOTE — PROGRESS NOTE ADULT - ASSESSMENT
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   bacteremias now controlled and cellulitis has resolved  No fever and no leukocytosis  Anticoagulation per medicine  Trial of void with history of elevated PVR per medicine    Plan:    day 14/42 of Cefepime via PICC  Keep leg elevated for edema  w/u for GI malignancy if candidate  Weekly CBC,Diff and CMP when discharged

## 2019-06-18 NOTE — PROGRESS NOTE ADULT - SUBJECTIVE AND OBJECTIVE BOX
CC: f/u for polymicrobic bacteremia    Patient is afebrile, tolerating diet,  no new complaints    REVIEW OF SYSTEMS: limited as above  All other review of systems negative (Comprehensive ROS)    Antimicrobials Day #  :day 14/42  cefepime   IVPB 2000 milliGRAM(s) IV Intermittent daily    Other Medications Reviewed    Vital Signs Last 24 Hrs  T(C): 36.4 (18 Jun 2019 06:54), Max: 36.4 (17 Jun 2019 16:20)  T(F): 97.5 (18 Jun 2019 06:54), Max: 97.6 (17 Jun 2019 19:56)  HR: 62 (18 Jun 2019 06:54) (61 - 68)  BP: 113/54 (18 Jun 2019 06:54) (100/57 - 113/54)  BP(mean): --  RR: 17 (18 Jun 2019 09:38) (15 - 17)  SpO2: 100% (18 Jun 2019 09:38) (98% - 100%)    PHYSICAL EXAM:  General: alert, no acute distress  Eyes:  anicteric, no conjunctival injection, no discharge  Oropharynx: no lesions or injection 	  Neck: supple, without adenopathy  Lungs: clear to auscultation, diminished at bases  Heart: irregular rate and rhythm;+harvey  Abdomen: soft, nondistended, nontender, without mass or organomegaly  Skin: trace edema  Extremities: no clubbing, cyanosis, or edema  Neurologic: alert, oriented, moves all extremities    LAB RESULTS:                          10.5   8.66  )-----------( 249      ( 18 Jun 2019 05:20 )             33.6   06-18    140  |  107  |  40<H>  ----------------------------<  94  3.8   |  24  |  1.46<H>    Ca    9.3      18 Jun 2019 05:20        MICROBIOLOGY:  RECENT CULTURES:  06-13 @ 09:45 .Blood Blood-Venous     No growth to date.          RADIOLOGY REVIEWED:    < from: Xray Chest 1 View- PORTABLE-Urgent (06.16.19 @ 11:44) >  Interval placement of a right PICC with tip near the superior cavoatrial   junction.    Pulmonary vascular congestion and pulmonary edema have improved since the   prior study. Superimposed pneumonia cannot be excluded.    Mild to moderate bilateral pleural effusions.    < end of copied text >

## 2019-06-18 NOTE — CHART NOTE - NSCHARTNOTEFT_GEN_A_CORE
Patient will benefit from a hospital bed Semi-electric. Patient requires frequent changes in body position to prevent skin breakdown. Head of the bed should be elevated to improve respiratory status.    Patient also will benefit from bedside commode.     Patient requires home O2.  Resting O2 sat on RA 94%.  After exercise, O2 sat 80% RA which improved to 89% with 3L NC.  Length of need is lifetime=99 ADULT PLAN OF CARE:    Patient will benefit from a hospital bed Semi-electric. Patient requires frequent changes in body position to prevent skin breakdown. Head of the bed should be elevated to improve respiratory status.    Patient also will benefit from bedside commode.     Patient requires home O2.  Resting O2 sat on RA 94%.  After exercise, O2 sat 80% RA which returned to 89% with 3L NC with ambulation.  Length of need is lifetime=99 ADULT PLAN OF CARE:    Patient will benefit from a hospital bed Semi-electric. Patient requires frequent changes in body position to prevent skin breakdown. Head of the bed should be elevated to improve respiratory status.    Patient also will benefit from bedside commode.     Patient requires home O2.  Resting O2 sat on RA 94%.  During exercise, O2 sat 80% RA which returned to 89% with 3L NC after ambulation.  Length of need is lifetime=99

## 2019-06-18 NOTE — PROGRESS NOTE ADULT - SUBJECTIVE AND OBJECTIVE BOX
Follow up for  SUBJ:    no cardiac symptoms offered     PMH  Aortic valve disease  Non-rheumatic mitral regurgitation  HLD (hyperlipidemia)  Melanoma  Diverticulosis of small intestine without hemorrhage  Afib  Anxiety  Essential hypertension      MEDICATIONS  (STANDING):  aspirin enteric coated 81 milliGRAM(s) Oral daily  atorvastatin 80 milliGRAM(s) Oral at bedtime  cefepime   IVPB 2000 milliGRAM(s) IV Intermittent daily  digoxin     Tablet 0.125 milliGRAM(s) Oral daily  escitalopram 5 milliGRAM(s) Oral daily  furosemide    Tablet 20 milliGRAM(s) Oral daily  metoprolol tartrate 25 milliGRAM(s) Oral two times a day  mirtazapine 15 milliGRAM(s) Oral at bedtime  warfarin 4 milliGRAM(s) Oral once    MEDICATIONS  (PRN):  acetaminophen   Tablet .. 650 milliGRAM(s) Oral every 6 hours PRN Temp greater or equal to 38C (100.4F), Mild Pain (1 - 3)  sodium chloride 0.9% lock flush 10 milliLiter(s) IV Push every 1 hour PRN Pre/post blood products, medications, blood draw, and to maintain line patency        PHYSICAL EXAM:  Vital Signs Last 24 Hrs  T(C): 36.4 (18 Jun 2019 12:16), Max: 36.4 (17 Jun 2019 16:20)  T(F): 97.5 (18 Jun 2019 12:16), Max: 97.6 (17 Jun 2019 19:56)  HR: 100 (18 Jun 2019 12:16) (61 - 100)  BP: 98/51 (18 Jun 2019 12:16) (98/51 - 113/54)  BP(mean): --  RR: 17 (18 Jun 2019 12:16) (15 - 17)  SpO2: 100% (18 Jun 2019 12:16) (98% - 100%)    GENERAL: NAD, elderly  woman  HEAD:  Atraumatic, Normocephalic  EYES: EOMI, PERRLA, conjunctiva and sclera clear  ENT: Moist mucous membranes,  NECK: Supple, No JVD, no bruits  CHEST/LUNG: Clear to percussion bilaterally; No rales, rhonchi, wheezing, or rubs  HEART: Regular rate and rhythm; No murmurs, rubs, or gallops PMI non displaced.  ABDOMEN: Soft, Nontender, Nondistended; Bowel sounds present  EXTREMITIES:  2+ Peripheral Pulses, No clubbing, cyanosis, or edema  SKIN: No rashes or lesions  NERVOUS SYSTEM:  Cranial Nerves II-XII intact      TELEMETRY:    afib 75    ECG:    < from: 12 Lead ECG (06.15.19 @ 21:35) >  Ventricular Rate 94 BPM    Atrial Rate 113 BPM    QRS Duration 100 ms    Q-T Interval 368 ms    QTC Calculation(Bezet) 460 ms    R Axis 126 degrees    T Axis 0 degrees    Diagnosis Line Atrial fibrillation  Right axis deviation  Incomplete right bundle branch block  Possible Right ventricular hypertrophy  Septal infarct , age undetermined  Abnormal ECG  When compared with ECG of 06-JUN-2019 09:18,  No significant change was found  Confirmed by ROSALIO HAMLIN, NEHA RICHARDSON ([id number]) on 6/16/2019 9:16:47 AM    < end of copied text >    ECHO:    < from: TTE Echo Complete w/Doppler (06.06.19 @ 11:39) >    Summary:   1. Left ventricular ejection fraction, by visual estimation, is 60%.   2. Normal global left ventricular systolic function.   3. Spectral Doppler shows restrictive pattern of left ventricular   myocardial filling (Grade III diastolic dysfunction).   4. D systolic flattening consistent with severe pulmonary hypertenisn.   5. Severely enlarged right ventricle.   6. Severe tricuspid regurgitation.   7. Aortic valve is normally functioning mechanical prosthetic valve.   8. Mechanical prosthesis in the aortic valve position.   9. Moderate pulmonic valve regurgitation.  10. Estimated pulmonary artery systolic pressure is 135.6 mmHg assuming a   right atrial pressure of 15 mmHg, which is consistent with severe   pulmonary hypertension.  11. Severe pulmonary hypertension is present.  12. Moderate interatrial left to right shunt consider a PFO a patent   formaen ovale.  13. Bernoulli equation may be inaccurate in the setting of severe TR.   elevated qp/qs ratio >2 with right heart enlargement suggests a signicant   intracardiac shunt.  14. Biatrial enlargement suggests restrictive physiology.  15. Aortic doppler consistent with a mature aortic vavle prosthesis.    [id number] Essence Simeon , Electronically signed on 6/6/2019 at 3:13:24   PM      *** Final ***      ESSENCE SIMEON   This document has been electronically signed. Jun 6 2019 11:39AM    < end of copied text >      LABS:                        10.5   8.66  )-----------( 249      ( 18 Jun 2019 05:20 )             33.6     06-18    140  |  107  |  40<H>  ----------------------------<  94  3.8   |  24  |  1.46<H>    Ca    9.3      18 Jun 2019 05:20    PT/INR - ( 18 Jun 2019 05:20 )   PT: 19.9 sec;   INR: 1.75 ratio        I&O's Summary    17 Jun 2019 07:01  -  18 Jun 2019 07:00  --------------------------------------------------------  IN: 420 mL / OUT: 100 mL / NET: 320 mL    18 Jun 2019 07:01  -  18 Jun 2019 12:24  --------------------------------------------------------  IN: 200 mL / OUT: 0 mL / NET: 200 mL      BNP    RADIOLOGY & ADDITIONAL STUDIES:    < from: Xray Chest 1 View- PORTABLE-Urgent (06.16.19 @ 11:44) >  EXAM:  XR CHEST PORTABLE URGENT 1V      PROCEDURE DATE:  06/16/2019        INTERPRETATION:  CLINICAL INDICATION: Shortness of breath. PICC placement.    COMPARISON: Chest radiograph 6/15/2019     FINDINGS:  Interval placement of a right PICC with tip near the superior cavoatrial   junction.  Pulmonary vascular congestion and pulmonary edema have improved since the   prior study. Mild to moderate bilateral pleural effusions.   There is no evidence of pneumothorax.  The heart size is enlarged. Cardiac valve surgery.    IMPRESSION:  Interval placement of a right PICC with tip near the superior cavoatrial   junction.    Pulmonary vascular congestion and pulmonary edema have improved since the   prior study. Superimposed pneumonia cannot be excluded.    Mild to moderate bilateral pleural effusions.        AURELIANO EDGE   This document has been electronically signed. Jun 16 2019  4:45PM    < end of copied text >      ECHO:

## 2019-06-18 NOTE — PROGRESS NOTE ADULT - SUBJECTIVE AND OBJECTIVE BOX
Resting    Vital Signs Last 24 Hrs  T(C): 36.5 (06-18-19 @ 18:00), Max: 36.5 (06-18-19 @ 16:00)  T(F): 97.7 (06-18-19 @ 18:00), Max: 97.7 (06-18-19 @ 16:00)  HR: 67 (06-18-19 @ 18:00) (61 - 100)  BP: 96/49 (06-18-19 @ 18:00) (96/49 - 113/54)  RR: 17 (06-18-19 @ 18:00) (15 - 17)  SpO2: 100% (06-18-19 @ 18:00) (98% - 100%)    Card S1S2  Lungs fair air entry b/l  Abd soft, NT, ND  Extr sm edema, erythema LLE resolved                                                                                  10.5   8.66  )-----------( 249      ( 18 Jun 2019 05:20 )             33.6     18 Jun 2019 05:20    140    |  107    |  40     ----------------------------<  94     3.8     |  24     |  1.46     Ca    9.3        18 Jun 2019 05:20    acetaminophen   Tablet .. 650 milliGRAM(s) Oral every 6 hours PRN  aspirin enteric coated 81 milliGRAM(s) Oral daily  atorvastatin 80 milliGRAM(s) Oral at bedtime  cefepime   IVPB 2000 milliGRAM(s) IV Intermittent daily  digoxin     Tablet 0.125 milliGRAM(s) Oral daily  escitalopram 5 milliGRAM(s) Oral daily  furosemide    Tablet 20 milliGRAM(s) Oral daily  metoprolol tartrate 25 milliGRAM(s) Oral two times a day  mirtazapine 15 milliGRAM(s) Oral at bedtime  sodium chloride 0.9% lock flush 10 milliLiter(s) IV Push every 1 hour PRN  warfarin 4 milliGRAM(s) Oral once    A/P:    Septic/hemodynamic HENRY on CKD 3  Improved  No nephrotoxins  F/u BMP   Avoid hypotension   Encourage PO fluids  Strep bacteremia  Abx per ID  F/u renal fx on Lasix

## 2019-06-18 NOTE — PROGRESS NOTE ADULT - SUBJECTIVE AND OBJECTIVE BOX
Patient is a 89y old  Female who presents with a chief complaint of left lower extremity cellulitis (17 Jun 2019 20:48)      INTERVAL HPI/OVERNIGHT EVENTS:  no complaints    REVIEW OF SYSTEMS:  CONSTITUTIONAL: No fever, weight loss, or fatigue  EYES: No eye pain, visual disturbances, or discharge  ENMT:  No difficulty hearing, tinnitus, vertigo; No sinus or throat pain  NECK: No pain or stiffness  BREASTS: No pain, masses, or nipple discharge  RESPIRATORY: No cough, wheezing, chills or hemoptysis; No shortness of breath  CARDIOVASCULAR: No chest pain, palpitations, dizziness, or leg swelling  GASTROINTESTINAL: No abdominal or epigastric pain. No nausea, vomiting, or hematemesis; No diarrhea or constipation. No melena or hematochezia.  GENITOURINARY: No dysuria, frequency, hematuria, or incontinence  NEUROLOGICAL: No headaches, memory loss, loss of strength, numbness, or tremors  SKIN: No itching, burning, rashes, or lesions   LYMPH NODES: No enlarged glands  ENDOCRINE: No heat or cold intolerance; No hair loss  MUSCULOSKELETAL: No joint pain or swelling; No muscle, back, or extremity pain  PSYCHIATRIC: No depression, anxiety, mood swings, or difficulty sleeping  HEME/LYMPH: No easy bruising, or bleeding gums  ALLERY AND IMMUNOLOGIC: No hives or eczema  FAMILY HISTORY:  No significant family history    T(C): 36.4 (06-18-19 @ 06:54), Max: 36.4 (06-17-19 @ 16:20)  HR: 62 (06-18-19 @ 06:54) (61 - 77)  BP: 113/54 (06-18-19 @ 06:54) (100/57 - 113/54)  RR: 15 (06-18-19 @ 06:54) (15 - 15)  SpO2: 100% (06-18-19 @ 06:54) (93% - 100%)  Wt(kg): --Vital Signs Last 24 Hrs  T(C): 36.4 (18 Jun 2019 06:54), Max: 36.4 (17 Jun 2019 16:20)  T(F): 97.5 (18 Jun 2019 06:54), Max: 97.6 (17 Jun 2019 19:56)  HR: 62 (18 Jun 2019 06:54) (61 - 77)  BP: 113/54 (18 Jun 2019 06:54) (100/57 - 113/54)  BP(mean): --  RR: 15 (18 Jun 2019 06:54) (15 - 15)  SpO2: 100% (18 Jun 2019 06:54) (93% - 100%)    T(F): 97.5 (06-18-19 @ 06:54), Max: 98 (06-16-19 @ 13:20)  HR: 62 (06-18-19 @ 06:54) (54 - 95)  BP: 113/54 (06-18-19 @ 06:54) (96/56 - 114/68)  RR: 15 (06-18-19 @ 06:54) (15 - 17)  SpO2: 100% (06-18-19 @ 06:54) (93% - 100%)    PHYSICAL EXAM:  GENERAL: NAD, well-groomed, well-developed  HEAD:  Atraumatic, Normocephalic  EYES: EOMI, PERRLA, conjunctiva and sclera clear  ENMT: No tonsillar erythema, exudates, or enlargement; Moist mucous membranes, Good dentition, No lesions  NECK: Supple, No JVD, Normal thyroid  NERVOUS SYSTEM:  Alert & Oriented X3, Good concentration; Motor Strength 5/5 B/L upper and lower extremities; DTRs 2+ intact and symmetric  CHEST/LUNG: Clear to percussion bilaterally; No rales, rhonchi, wheezing, or rubs  HEART: Regular rate and rhythm; No murmurs, rubs, or gallops  ABDOMEN: Soft, Nontender, Nondistended; Bowel sounds present  EXTREMITIES:  2+ Peripheral Pulses, No clubbing, cyanosis, or edema  LYMPH: No lymphadenopathy noted  SKIN: No rashes or lesions    Consultant(s) Notes Reviewed:  [x ] YES  [ ] NO  Care Discussed with Consultants/Other Providers [ x] YES  [ ] NO    LABS:  06-18    140  |  107  |  40<H>  ----------------------------<  94  3.8   |  24  |  1.46<H>    Ca    9.3      18 Jun 2019 05:20                            10.5   8.66  )-----------( 249      ( 18 Jun 2019 05:20 )             33.6         PT/INR - ( 18 Jun 2019 05:20 )   PT: 19.9 sec;   INR: 1.75 ratio                              10.5   8.66  )-----------( 249      ( 06-18 @ 05:20 )             33.6                11.3   9.56  )-----------( 284      ( 06-15 @ 11:00 )             36.4                12.2   8.35  )-----------( 211      ( 06-14 @ 06:19 )             38.5                10.4   10.58 )-----------( 279      ( 06-13 @ 05:10 )             33.1                10.5   8.86  )-----------( 275      ( 06-12 @ 07:14 )             32.4                11.2   10.28 )-----------( 292      ( 06-11 @ 13:35 )             34.3                12.7   12.95 )-----------( 237      ( 06-09 @ 10:50 )             39.5                11.9   10.86 )-----------( 249      ( 06-08 @ 06:23 )             37.2                11.7   9.02  )-----------( 242      ( 06-06 @ 05:05 )             35.8                11.3   9.31  )-----------( 187      ( 06-05 @ 06:55 )             34.3                13.1   10.51 )-----------( 196      ( 06-04 @ 13:15 )             39.6               RADIOLOGY & ADDITIONAL TESTS:    Imaging Personally Reviewed:  [ ] YES  [ ] NO  acetaminophen   Tablet .. 650 milliGRAM(s) Oral every 6 hours PRN  aspirin enteric coated 81 milliGRAM(s) Oral daily  atorvastatin 80 milliGRAM(s) Oral at bedtime  cefepime   IVPB 2000 milliGRAM(s) IV Intermittent daily  digoxin     Tablet 0.125 milliGRAM(s) Oral daily  escitalopram 5 milliGRAM(s) Oral daily  furosemide    Tablet 20 milliGRAM(s) Oral daily  metoprolol tartrate 25 milliGRAM(s) Oral two times a day  mirtazapine 15 milliGRAM(s) Oral at bedtime  sodium chloride 0.9% lock flush 10 milliLiter(s) IV Push every 1 hour PRN  warfarin 4 milliGRAM(s) Oral once      HEALTH ISSUES - PROBLEM Dx:  Sigmoid thickening: Sigmoid thickening  Bacteremia: Bacteremia  Hypokalemia: Hypokalemia  Essential hypertension: Essential hypertension  Anxiety: Anxiety  Atrial fibrillation, unspecified type: Atrial fibrillation, unspecified type  Aortic valve disease: Aortic valve disease  Skin ulcer, unspecified ulcer stage: Skin ulcer, unspecified ulcer stage  HENRY (acute kidney injury): HENRY (acute kidney injury)  Cellulitis of left leg: Cellulitis of left leg

## 2019-06-18 NOTE — CHART NOTE - NSCHARTNOTEFT_GEN_A_CORE
Reviewed prior progress notes, labs and imaging.    Discussed with Dr. 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

## 2019-06-18 NOTE — PROGRESS NOTE ADULT - ASSESSMENT
Owusu has been removed discussed with family at length 24 hour care is ready at home will get home physical therapy and make arrangements for oxygen and continuation of her intravenous antibiotics

## 2019-06-19 LAB
INR BLD: 1.72 RATIO — HIGH (ref 0.88–1.16)
PROTHROM AB SERPL-ACNC: 19.6 SEC — HIGH (ref 10–12.9)

## 2019-06-19 PROCEDURE — 99233 SBSQ HOSP IP/OBS HIGH 50: CPT

## 2019-06-19 RX ORDER — WARFARIN SODIUM 2.5 MG/1
5 TABLET ORAL ONCE
Refills: 0 | Status: COMPLETED | OUTPATIENT
Start: 2019-06-19 | End: 2019-06-19

## 2019-06-19 RX ADMIN — WARFARIN SODIUM 5 MILLIGRAM(S): 2.5 TABLET ORAL at 21:06

## 2019-06-19 RX ADMIN — MIRTAZAPINE 15 MILLIGRAM(S): 45 TABLET, ORALLY DISINTEGRATING ORAL at 21:04

## 2019-06-19 RX ADMIN — Medication 0.12 MILLIGRAM(S): at 05:25

## 2019-06-19 RX ADMIN — ATORVASTATIN CALCIUM 80 MILLIGRAM(S): 80 TABLET, FILM COATED ORAL at 21:03

## 2019-06-19 RX ADMIN — ESCITALOPRAM OXALATE 5 MILLIGRAM(S): 10 TABLET, FILM COATED ORAL at 11:29

## 2019-06-19 RX ADMIN — Medication 25 MILLIGRAM(S): at 18:21

## 2019-06-19 RX ADMIN — CEFEPIME 100 MILLIGRAM(S): 1 INJECTION, POWDER, FOR SOLUTION INTRAMUSCULAR; INTRAVENOUS at 11:29

## 2019-06-19 RX ADMIN — Medication 20 MILLIGRAM(S): at 05:25

## 2019-06-19 RX ADMIN — Medication 81 MILLIGRAM(S): at 11:29

## 2019-06-19 NOTE — PROGRESS NOTE ADULT - ASSESSMENT
Physical therapy recommended home with physical therapy family in agreement arrangements at home to be made including home O2 and home physical therapy continue a full 6 weeks course of the antibiotics

## 2019-06-19 NOTE — PROGRESS NOTE ADULT - SUBJECTIVE AND OBJECTIVE BOX
Patient is a 89y old  Female who presents with a chief complaint of left lower extremity cellulitis (19 Jun 2019 12:28)      INTERVAL HPI/OVERNIGHT EVENTS:  Alert voicing no complaints except being weak      REVIEW OF SYSTEMS:  CONSTITUTIONAL: No fever, weight loss, or fatigue  EYES: No eye pain, visual disturbances, or discharge  ENMT:  No difficulty hearing, tinnitus, vertigo; No sinus or throat pain  NECK: No pain or stiffness  BREASTS: No pain, masses, or nipple discharge  RESPIRATORY: No cough, wheezing, chills or hemoptysis; No shortness of breath  CARDIOVASCULAR: No chest pain, palpitations, dizziness, or leg swelling  GASTROINTESTINAL: No abdominal or epigastric pain. No nausea, vomiting, or hematemesis; No diarrhea or constipation. No melena or hematochezia.  GENITOURINARY: No dysuria, frequency, hematuria, or incontinence  NEUROLOGICAL: No headaches, memory loss, loss of strength, numbness, or tremors  SKIN: No itching, burning, rashes, or lesions   LYMPH NODES: No enlarged glands  ENDOCRINE: No heat or cold intolerance; No hair loss  MUSCULOSKELETAL: No joint pain or swelling; No muscle, back, or extremity pain  PSYCHIATRIC: No depression, anxiety, mood swings, or difficulty sleeping  HEME/LYMPH: No easy bruising, or bleeding gums  ALLERY AND IMMUNOLOGIC: No hives or eczema  FAMILY HISTORY:  No significant family history    T(C): 36.3 (06-19-19 @ 09:05), Max: 36.5 (06-18-19 @ 16:00)  HR: 59 (06-19-19 @ 09:05) (50 - 100)  BP: 121/60 (06-19-19 @ 09:05) (96/49 - 121/60)  RR: 16 (06-19-19 @ 09:05) (16 - 17)  SpO2: 96% (06-19-19 @ 09:05) (96% - 100%)  Wt(kg): --Vital Signs Last 24 Hrs  T(C): 36.3 (19 Jun 2019 09:05), Max: 36.5 (18 Jun 2019 16:00)  T(F): 97.4 (19 Jun 2019 09:05), Max: 97.7 (18 Jun 2019 16:00)  HR: 59 (19 Jun 2019 09:05) (50 - 100)  BP: 121/60 (19 Jun 2019 09:05) (96/49 - 121/60)  BP(mean): --  RR: 16 (19 Jun 2019 09:05) (16 - 17)  SpO2: 96% (19 Jun 2019 09:05) (96% - 100%)    T(F): 97.4 (06-19-19 @ 09:05), Max: 97.7 (06-18-19 @ 16:00)  HR: 59 (06-19-19 @ 09:05) (50 - 100)  BP: 121/60 (06-19-19 @ 09:05) (96/49 - 121/60)  RR: 16 (06-19-19 @ 09:05) (15 - 17)  SpO2: 96% (06-19-19 @ 09:05) (93% - 100%)    PHYSICAL EXAM:  GENERAL: NAD, well-groomed, well-developed  HEAD:  Atraumatic, Normocephalic  EYES: EOMI, PERRLA, conjunctiva and sclera clear  ENMT: No tonsillar erythema, exudates, or enlargement; Moist mucous membranes, Good dentition, No lesions  NECK: Supple, No JVD, Normal thyroid  NERVOUS SYSTEM:  Alert & Oriented X3, Good concentration; Motor Strength 5/5 B/L upper and lower extremities; DTRs 2+ intact and symmetric  CHEST/LUNG: Clear to percussion bilaterally; No rales, rhonchi, wheezing, or rubs  HEART: Regular rate and rhythm; No murmurs, rubs, or gallops  ABDOMEN: Soft, Nontender, Nondistended; Bowel sounds present  EXTREMITIES:  2+ Peripheral Pulses, No clubbing, cyanosis, or edema  LYMPH: No lymphadenopathy noted  SKIN: No rashes or lesions    Consultant(s) Notes Reviewed:  [x ] YES  [ ] NO  Care Discussed with Consultants/Other Providers [ x] YES  [ ] NO    LABS:  06-18    140  |  107  |  40<H>  ----------------------------<  94  3.8   |  24  |  1.46<H>    Ca    9.3      18 Jun 2019 05:20                            10.5   8.66  )-----------( 249      ( 18 Jun 2019 05:20 )             33.6         PT/INR - ( 19 Jun 2019 05:36 )   PT: 19.6 sec;   INR: 1.72 ratio                              10.5   8.66  )-----------( 249      ( 06-18 @ 05:20 )             33.6                11.3   9.56  )-----------( 284      ( 06-15 @ 11:00 )             36.4                12.2   8.35  )-----------( 211      ( 06-14 @ 06:19 )             38.5                10.4   10.58 )-----------( 279      ( 06-13 @ 05:10 )             33.1                10.5   8.86  )-----------( 275      ( 06-12 @ 07:14 )             32.4                11.2   10.28 )-----------( 292      ( 06-11 @ 13:35 )             34.3                12.7   12.95 )-----------( 237      ( 06-09 @ 10:50 )             39.5                11.9   10.86 )-----------( 249      ( 06-08 @ 06:23 )             37.2                11.7   9.02  )-----------( 242      ( 06-06 @ 05:05 )             35.8                11.3   9.31  )-----------( 187      ( 06-05 @ 06:55 )             34.3                13.1   10.51 )-----------( 196      ( 06-04 @ 13:15 )             39.6               RADIOLOGY & ADDITIONAL TESTS:    Imaging Personally Reviewed:  [ ] YES  [ ] NO  acetaminophen   Tablet .. 650 milliGRAM(s) Oral every 6 hours PRN  aspirin enteric coated 81 milliGRAM(s) Oral daily  atorvastatin 80 milliGRAM(s) Oral at bedtime  cefepime   IVPB 2000 milliGRAM(s) IV Intermittent daily  digoxin     Tablet 0.125 milliGRAM(s) Oral daily  escitalopram 5 milliGRAM(s) Oral daily  furosemide    Tablet 20 milliGRAM(s) Oral daily  metoprolol tartrate 25 milliGRAM(s) Oral two times a day  mirtazapine 15 milliGRAM(s) Oral at bedtime  sodium chloride 0.9% lock flush 10 milliLiter(s) IV Push every 1 hour PRN  warfarin 5 milliGRAM(s) Oral once      HEALTH ISSUES - PROBLEM Dx:  Sigmoid thickening: Sigmoid thickening  Bacteremia: Bacteremia  Hypokalemia: Hypokalemia  Essential hypertension: Essential hypertension  Anxiety: Anxiety  Atrial fibrillation, unspecified type: Atrial fibrillation, unspecified type  Aortic valve disease: Aortic valve disease  Skin ulcer, unspecified ulcer stage: Skin ulcer, unspecified ulcer stage  HENRY (acute kidney injury): HENRY (acute kidney injury)  Cellulitis of left leg: Cellulitis of left leg

## 2019-06-19 NOTE — CHART NOTE - NSCHARTNOTEFT_GEN_A_CORE
Reviewed prior progress notes, labs and imaging.    Discussed with Dr. 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 [facility] (PT. recommends [facility]?) with IB abx via PICC- pt. wants to go home  Dr. Russo left message for son to decide home vs. [facility]; also needs PT note    Will need weekly CBC/CMP  Code Status: pall care met with son on [place] MOLST to be filled out w son    Plan d/w DR Russo - awaiting for son to call back with plan: home vs/ [facility]

## 2019-06-19 NOTE — PROGRESS NOTE ADULT - SUBJECTIVE AND OBJECTIVE BOX
Resting    Vital Signs Last 24 Hrs  T(C): 36.5 (06-19-19 @ 16:08), Max: 36.5 (06-18-19 @ 18:00)  T(F): 97.7 (06-19-19 @ 16:08), Max: 97.7 (06-18-19 @ 18:00)  HR: 85 (06-19-19 @ 16:08) (50 - 85)  BP: 111/69 (06-19-19 @ 16:08) (96/49 - 121/60)  RR: 16 (06-19-19 @ 16:08) (16 - 17)  SpO2: 100% (06-19-19 @ 16:08) (96% - 100%)    Card S1S2  Lungs fair air entry b/l  Abd soft, NT, ND  Extr sm edema, erythema LLE resolved                                                                                  10.5   8.66  )-----------( 249      ( 18 Jun 2019 05:20 )             33.6     18 Jun 2019 05:20    140    |  107    |  40     ----------------------------<  94     3.8     |  24     |  1.46     Ca    9.3        18 Jun 2019 05:20    PT/INR - ( 19 Jun 2019 05:36 )   PT: 19.6 sec;   INR: 1.72 ratio      acetaminophen   Tablet .. 650 milliGRAM(s) Oral every 6 hours PRN  aspirin enteric coated 81 milliGRAM(s) Oral daily  atorvastatin 80 milliGRAM(s) Oral at bedtime  cefepime   IVPB 2000 milliGRAM(s) IV Intermittent daily  digoxin     Tablet 0.125 milliGRAM(s) Oral daily  escitalopram 5 milliGRAM(s) Oral daily  furosemide    Tablet 20 milliGRAM(s) Oral daily  metoprolol tartrate 25 milliGRAM(s) Oral two times a day  mirtazapine 15 milliGRAM(s) Oral at bedtime  sodium chloride 0.9% lock flush 10 milliLiter(s) IV Push every 1 hour PRN  warfarin 5 milliGRAM(s) Oral once    A/P:    Septic/hemodynamic HENRY on CKD 3  Improved  No nephrotoxins  F/u BMP   Avoid hypotension   Encourage PO fluids  Strep bacteremia  Abx per ID  Stable renal fx on Lasix so far

## 2019-06-19 NOTE — PROGRESS NOTE ADULT - SUBJECTIVE AND OBJECTIVE BOX
CC: f/u for polymicrobial bacteremia    Patient reports: she is comfortable on supplemental oxygen, hoping to go home soon    REVIEW OF SYSTEMS:  All other review of systems negative (Comprehensive ROS)    Antimicrobials Day #  :day 15/42  cefepime   IVPB 2000 milliGRAM(s) IV Intermittent daily    Other Medications Reviewed    T(F): 97.4 (06-19-19 @ 09:05), Max: 97.7 (06-18-19 @ 16:00)  HR: 59 (06-19-19 @ 09:05)  BP: 121/60 (06-19-19 @ 09:05)  RR: 16 (06-19-19 @ 09:05)  SpO2: 96% (06-19-19 @ 09:05)  Wt(kg): --    PHYSICAL EXAM:  General: alert, no acute distress  Eyes:  anicteric, no conjunctival injection, no discharge  Oropharynx: no lesions or injection 	  Neck: supple, without adenopathy  Lungs: clear to auscultation, diminished at bases  Heart: irregular rate and rhythm;   Abdomen: soft, nondistended, nontender, without mass or organomegaly  Skin: scattered upper extremity bruises  Extremities: no clubbing, cyanosis, or edema  Neurologic: alert, oriented, moves all extremities  PICC in right arm  LAB RESULTS:                        10.5   8.66  )-----------( 249      ( 18 Jun 2019 05:20 )             33.6     06-18    140  |  107  |  40<H>  ----------------------------<  94  3.8   |  24  |  1.46<H>    Ca    9.3      18 Jun 2019 05:20          MICROBIOLOGY:  RECENT CULTURES:      RADIOLOGY REVIEWED:    < from: Xray Chest 1 View- PORTABLE-Urgent (06.16.19 @ 11:44) >  IMPRESSION:  Interval placement of a right PICC with tip near the superior cavoatrial   junction.    Pulmonary vascular congestion and pulmonary edema have improved since the   prior study. Superimposed pneumonia cannot be excluded.    Mild to moderate bilateral pleural effusions.    < end of copied text >

## 2019-06-19 NOTE — PROGRESS NOTE ADULT - ASSESSMENT
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   bacteremias now controlled and cellulitis has resolved  No fever and no leukocytosis  Anticoagulation per medicine  Trial of void with history of elevated PVR per medicine  CKD : creat is stable  S/P PICC line in right arm.  Plan:  day 15/42 of Cefepime via PICC  Keep leg elevated for edema  Weekly CBC,Diff and CMP when discharged  ID issues reviewed with children, ID fu in 1- 2 weeks advised

## 2019-06-19 NOTE — GOALS OF CARE CONVERSATION - PERSONAL ADVANCE DIRECTIVE - FAMILY/CHILD(REN)
This writer called DAVIN Calle at patient's group home and confirmed  Their staff will pick her up for discharge on Friday, 1/19 at 2pm.  Medications have been escripted to the [place] [facility] which is used by group [place] ([facility]).   rafael Vo

## 2019-06-20 ENCOUNTER — APPOINTMENT (OUTPATIENT)
Dept: INTERNAL MEDICINE | Facility: CLINIC | Age: 84
End: 2019-06-20

## 2019-06-20 DIAGNOSIS — R00.1 BRADYCARDIA, UNSPECIFIED: ICD-10-CM

## 2019-06-20 LAB
ANION GAP SERPL CALC-SCNC: 9 MMOL/L — SIGNIFICANT CHANGE UP (ref 5–17)
BUN SERPL-MCNC: 36 MG/DL — HIGH (ref 7–23)
CALCIUM SERPL-MCNC: 9.2 MG/DL — SIGNIFICANT CHANGE UP (ref 8.4–10.5)
CHLORIDE SERPL-SCNC: 107 MMOL/L — SIGNIFICANT CHANGE UP (ref 96–108)
CO2 SERPL-SCNC: 23 MMOL/L — SIGNIFICANT CHANGE UP (ref 22–31)
CREAT SERPL-MCNC: 1.35 MG/DL — HIGH (ref 0.5–1.3)
DIGOXIN SERPL-MCNC: 1.1 NG/ML — SIGNIFICANT CHANGE UP (ref 0.8–2)
GLUCOSE SERPL-MCNC: 73 MG/DL — SIGNIFICANT CHANGE UP (ref 70–99)
HCT VFR BLD CALC: 37.9 % — SIGNIFICANT CHANGE UP (ref 34.5–45)
HGB BLD-MCNC: 11.5 G/DL — SIGNIFICANT CHANGE UP (ref 11.5–15.5)
INR BLD: 1.7 RATIO — HIGH (ref 0.88–1.16)
MAGNESIUM SERPL-MCNC: 1.8 MG/DL — SIGNIFICANT CHANGE UP (ref 1.6–2.6)
MCHC RBC-ENTMCNC: 30.3 GM/DL — LOW (ref 32–36)
MCHC RBC-ENTMCNC: 31.8 PG — SIGNIFICANT CHANGE UP (ref 27–34)
MCV RBC AUTO: 104.7 FL — HIGH (ref 80–100)
NRBC # BLD: 0 /100 WBCS — SIGNIFICANT CHANGE UP (ref 0–0)
PHOSPHATE SERPL-MCNC: 2.9 MG/DL — SIGNIFICANT CHANGE UP (ref 2.5–4.5)
PLATELET # BLD AUTO: 222 K/UL — SIGNIFICANT CHANGE UP (ref 150–400)
POTASSIUM SERPL-MCNC: 4.4 MMOL/L — SIGNIFICANT CHANGE UP (ref 3.5–5.3)
POTASSIUM SERPL-SCNC: 4.4 MMOL/L — SIGNIFICANT CHANGE UP (ref 3.5–5.3)
PROTHROM AB SERPL-ACNC: 19.4 SEC — HIGH (ref 10–12.9)
RBC # BLD: 3.62 M/UL — LOW (ref 3.8–5.2)
RBC # FLD: 22.7 % — HIGH (ref 10.3–14.5)
SODIUM SERPL-SCNC: 139 MMOL/L — SIGNIFICANT CHANGE UP (ref 135–145)
WBC # BLD: 7.59 K/UL — SIGNIFICANT CHANGE UP (ref 3.8–10.5)
WBC # FLD AUTO: 7.59 K/UL — SIGNIFICANT CHANGE UP (ref 3.8–10.5)

## 2019-06-20 PROCEDURE — 71045 X-RAY EXAM CHEST 1 VIEW: CPT | Mod: 26

## 2019-06-20 PROCEDURE — 99232 SBSQ HOSP IP/OBS MODERATE 35: CPT

## 2019-06-20 PROCEDURE — 93010 ELECTROCARDIOGRAM REPORT: CPT

## 2019-06-20 RX ORDER — WARFARIN SODIUM 2.5 MG/1
5 TABLET ORAL ONCE
Refills: 0 | Status: COMPLETED | OUTPATIENT
Start: 2019-06-20 | End: 2019-06-20

## 2019-06-20 RX ORDER — ONDANSETRON 8 MG/1
4 TABLET, FILM COATED ORAL EVERY 12 HOURS
Refills: 0 | Status: DISCONTINUED | OUTPATIENT
Start: 2019-06-20 | End: 2019-06-21

## 2019-06-20 RX ORDER — GLUCAGON INJECTION, SOLUTION 0.5 MG/.1ML
5 INJECTION, SOLUTION SUBCUTANEOUS ONCE
Refills: 0 | Status: COMPLETED | OUTPATIENT
Start: 2019-06-20 | End: 2019-06-20

## 2019-06-20 RX ADMIN — ATORVASTATIN CALCIUM 80 MILLIGRAM(S): 80 TABLET, FILM COATED ORAL at 21:31

## 2019-06-20 RX ADMIN — WARFARIN SODIUM 5 MILLIGRAM(S): 2.5 TABLET ORAL at 21:30

## 2019-06-20 RX ADMIN — GLUCAGON INJECTION, SOLUTION 5 MILLIGRAM(S): 0.5 INJECTION, SOLUTION SUBCUTANEOUS at 09:04

## 2019-06-20 RX ADMIN — Medication 20 MILLIGRAM(S): at 05:36

## 2019-06-20 RX ADMIN — Medication 81 MILLIGRAM(S): at 11:18

## 2019-06-20 RX ADMIN — ESCITALOPRAM OXALATE 5 MILLIGRAM(S): 10 TABLET, FILM COATED ORAL at 11:18

## 2019-06-20 RX ADMIN — MIRTAZAPINE 15 MILLIGRAM(S): 45 TABLET, ORALLY DISINTEGRATING ORAL at 21:31

## 2019-06-20 RX ADMIN — CEFEPIME 100 MILLIGRAM(S): 1 INJECTION, POWDER, FOR SOLUTION INTRAMUSCULAR; INTRAVENOUS at 12:24

## 2019-06-20 NOTE — CHART NOTE - NSCHARTNOTEFT_GEN_A_CORE
Patient will require home O2 via NC.  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   will arrange home O2. Patient will require home O2 via NC.  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   will arrange home O2 at rest. Patient will require home O2 via NC.  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    will arrange home O2.

## 2019-06-20 NOTE — CONSULT NOTE ADULT - REASON FOR ADMISSION
left lower extremity cellulitis

## 2019-06-20 NOTE — PROGRESS NOTE ADULT - ASSESSMENT
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   bacteremias now controlled and cellulitis has resolved  No fever and no leukocytosis  Anticoagulation per medicine  Trial of void with history of elevated PVR per medicine  CKD : creat is stable  S/P PICC line in right arm.  Now being managed for bradycardia  Plan:  day 16/42 of Cefepime via PICC  Supportive care  Weekly CBC,Diff and CMP when discharged  ID issues reviewed with children, ID fu in 1- 2 weeks advised  Condition guarded, she will be at high risk for readmission when discharged

## 2019-06-20 NOTE — CONSULT NOTE ADULT - SUBJECTIVE AND OBJECTIVE BOX
ICU CONSULT  Location of Patient :  TELE 0225 D1 ( TELE)  Attending requesting Consult:Albino Russo  Chief Complaint : Cellulitis  Reason For consult : Bradycardia      Initial HPI on admission:  HPI:  89 year old female with Chronic Afib on coumadin on BB - Lopressor XL 100mg, Dig - 0.125mcg , Chronic Diastolic CHF, VHD s/p AVR Mechanical s/p MV Clip, MGUS, GERD who p/w LLE LE Cellulitis 6/4/19.   Hospital course complicated with bacteremia and possible endocarditis.   now s/p PICC on IV cefepime.   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  H/O aortic valve replacement: 2000-mechanical valve-on Coumadin    Allergies    chlorhexidine containing compounds (Unknown)  Originally Entered as [Unknown] reaction to [WIPES] (Unknown)  penicillin (Faint)  AMADO Chlorhexidine wipes (Hives; Rash)    Intolerances      FAMILY HISTORY:  No significant family history  Denies cardiac history in family    Social history:      Smoking: Denies     Drinking: Denies     Drug use: Denies    Review of Systems:  CONSTITUTIONAL: No fever, No chills, No fatigue  EYES: No eye pain, No visual disturbances, No discharge  ENMT:  No difficulty hearing, No tinnitus, No vertigo; No sinus or throat pain, No Dizziness  NECK: No pain or stiffness  RESPIRATORY: Mild SOB improving, No Cough,   CARDIOVASCULAR: No chest pain, No palpitations, No dizziness, or Chronic leg swelling improving  GASTROINTESTINAL: No abdominal or epigastric pain. No nausea, No vomiting, No hematemesis; No diarrhea or constipation. No melena, No hematochezia.  GENITOURINARY: No dysuria, No frequency, No hematuria, No incontinence  NEUROLOGICAL: No headaches, No memory loss, No loss of strength, No numbness, No tremors  SKIN: No itching, No burning, No rashes, No lesions   MUSCULOSKELETAL: No joint pain or swelling; No muscle, back, No extremity pain  PSYCHIATRIC: No depression, No anxiety, No mood swings, No difficulty sleeping    Medications:  MEDICATIONS  (STANDING):  aspirin enteric coated 81 milliGRAM(s) Oral daily  atorvastatin 80 milliGRAM(s) Oral at bedtime  cefepime   IVPB 2000 milliGRAM(s) IV Intermittent daily  escitalopram 5 milliGRAM(s) Oral daily  furosemide    Tablet 20 milliGRAM(s) Oral daily  mirtazapine 15 milliGRAM(s) Oral at bedtime  warfarin 5 milliGRAM(s) Oral once    MEDICATIONS  (PRN):  acetaminophen   Tablet .. 650 milliGRAM(s) Oral every 6 hours PRN Temp greater or equal to 38C (100.4F), Mild Pain (1 - 3)  sodium chloride 0.9% lock flush 10 milliLiter(s) IV Push every 1 hour PRN Pre/post blood products, medications, blood draw, and to maintain line patency      Home Medications:  Last Order Reconciliation Date: 06-04-19 @ 15:03 (Admission Reconciliation)  aspirin 81 mg oral tablet: 1 tab(s) orally once a day (10-29-18 @ 06:51)  Coumadin 2 mg oral tablet: 1 tab(s) orally once a day (11-25-15 @ 13:23)  Coumadin 5 mg oral tablet: 1 tab(s) orally 4 times a week- Last dose on 05/06/2016 (05-28-16 @ 10:52)  Coumadin 5 mg oral tablet: 1 tab(s) orally 4 times a week (02-06-18 @ 10:29)  Coumadin 5 mg oral tablet: 1 tab(s) orally once a day (at bedtime) (10-29-18 @ 06:51)  Coumadin 7.5 mg oral tablet: 1 tab(s) orally 3 times a week (02-06-18 @ 10:29)  Crestor 20 mg oral tablet: 1 tab(s) orally once a day (at bedtime) (10-29-18 @ 06:51)  digoxin 125 mcg (0.125 mg) oral tablet: 1 tab(s) orally every other day (at bedtime)  (10-29-18 @ 06:51)  docusate sodium 100 mg oral capsule: 1 cap(s) orally 3 times a day, As Needed - for constipation (02-06-18 @ 10:29)  escitalopram 5 mg oral tablet: 1 tab(s) orally once a day (06-04-19 @ 15:02)  ferrous sulfate 325 mg (65 mg elemental iron) oral tablet: 1 tab(s) orally 2 times a week Monday and Friday (10-29-18 @ 06:51)  furosemide 40 mg oral tablet: 1 tab(s) orally 5 times a week (10-29-18 @ 06:51)  furosemide 40 mg oral tablet: 2 tab(s) orally once a day (10-29-18 @ 06:51)  hydrochlorothiazide 12.5 mg oral tablet: 1 tab(s) orally once a day (02-06-18 @ 10:29)  lisinopril 10 mg oral tablet: 1 tab(s) orally once a day (05-28-16 @ 10:52)  metoprolol succinate 100 mg oral tablet, extended release: 1 tab(s) orally 2 times a day (10-29-18 @ 06:51)  metoprolol succinate 100 mg oral tablet, extended release: 1 tab(s) orally once a day (10-29-18 @ 06:51)  Metoprolol Succinate ER 50 mg oral tablet, extended release: 1.5 tab(s) orally 2 times a day (05-28-16 @ 10:52)  metoprolol tartrate 50 mg oral tablet: 1 tab(s) orally 2 times a day (02-06-18 @ 10:29)  MiraLax oral powder for reconstitution: 1 dose(s) orally once a day (02-06-18 @ 10:29)  pantoprazole 40 mg oral delayed release tablet: 1 tab(s) orally once a day (before a meal) (02-06-18 @ 10:29)  pantoprazole 40 mg oral delayed release tablet: 1 tab(s) orally once a day (10-29-18 @ 06:51)  potassium chloride 10 mEq oral capsule, extended release: 2 cap(s) orally once a day (10-29-18 @ 06:51)  PriLOSEC OTC 20 mg oral delayed release tablet: 1 tab(s) orally once a day (05-24-16 @ 06:32)  Remeron 15 mg oral tablet: 1 tab(s) orally once a day (at bedtime) (06-04-19 @ 15:02)  Slow-Mag 119 mg-71.5 mg oral delayed release tablet: 1 tab(s) orally 2 times a day (05-02-16 @ 15:46)  sucralfate 1 g oral tablet: 1 tab(s) orally 2 times a day (06-04-19 @ 14:58)  Tylenol 325 mg oral capsule: 2 tab(s) orally every 6 hours, As Needed mild pain (02-06-18 @ 10:29)      LABS:                        11.5   7.59  )-----------( 222      ( 20 Jun 2019 05:00 )             37.9     06-20    139  |  107  |  36<H>  ----------------------------<  73  4.4   |  23  |  1.35<H>    Ca    9.2      20 Jun 2019 05:00              PT/INR - ( 20 Jun 2019 05:00 )   PT: 19.4 sec;   INR: 1.70 ratio                     CULTURES: (if applicable)    Culture - Blood (collected 06-13-19 @ 09:45)  Source: .Blood Blood-Venous  Final Report (06-18-19 @ 14:00):    No growth at 5 days.    Culture - Blood (collected 06-09-19 @ 20:35)  Source: .Blood Blood  Final Report (06-15-19 @ 01:01):    No growth at 5 days.    Culture - Blood (collected 06-09-19 @ 20:35)  Source: .Blood Blood  Final Report (06-15-19 @ 01:01):    No growth at 5 days.    Culture - Blood (collected 06-08-19 @ 06:23)  Source: .Blood Blood  Gram Stain (06-09-19 @ 02:56):    Growth in aerobic bottle: Gram Negative Rods  Final Report (06-10-19 @ 17:41):    Growth in aerobic bottle: Pseudomonas aeruginosa    "Due to technical problems, Proteus sp. will Not be reported as part of    the BCID panel until further notice"    ***Blood Panel PCR results on this specimen are available    approximately 3 hours after the Gram stain result.***    Gram stain, PCR, and/or culture results may not always    correspond due to difference in methodologies.    ************************************************************    This PCR assay was performed using [employer].    Thefollowing targets are tested for: Enterococcus,    vancomycin resistant enterococci, Listeria monocytogenes,    coagulase negative staphylococci, S. aureus,    methicillin resistant S. aureus, Streptococcus agalactiae    (Group B), S. pneumoniae, S. pyogenes(Group A),    Acinetobacter baumannii, Enterobacter cloacae, E. coli,    Klebsiella oxytoca, K. pneumoniae, Proteus sp.,    Serratia marcescens, Haemophilus influenzae,    Neisseria meningitidis, Pseudomonas aeruginosa, Candida    albicans, C. glabrata, C krusei, C parapsilosis,    C. tropicalis and the KPC resistance gene.  Organism: Blood Culture PCR  Pseudomonas aeruginosa (06-10-19 @ 17:41)  Organism: Pseudomonas aeruginosa (06-10-19 @ 17:41)      -  Amikacin: S <=8      -  Aztreonam: S <=4      -  Cefepime: S <=2      -  Ceftazidime: S 4      -  Ciprofloxacin: S <=0.5      -  Gentamicin: S 4      -  Imipenem: S <=1      -  Levofloxacin: S <=1      -  Meropenem: S <=1      -  Piperacillin/Tazobactam: S <=8      -  Tobramycin: S <=2      Method Type: MIRIAM  Organism: Blood Culture PCR (06-10-19 @ 17:41)      -  Pseudomonas aeruginosa: Detec      Method Type: PCR    Culture - Blood (collected 06-08-19 @ 06:23)  Source: .Blood Blood  Gram Stain (06-09-19 @ 03:59):    Growth in aerobic bottle: Gram Negative Rods  Final Report (06-10-19 @ 17:44):    Growth in aerobic bottle: Pseudomonas aeruginosa    See previous culture [id number]            CAPILLARY BLOOD GLUCOSE          RADIOLOGY  CXR:      CT:    ECHO:          VITALS:  T(C): 36.3 (06-20-19 @ 05:33), Max: 36.5 (06-19-19 @ 16:08)  T(F): 97.4 (06-20-19 @ 05:33), Max: 97.7 (06-19-19 @ 16:08)  HR: 50 (06-20-19 @ 07:19) (44 - 85)  BP: 116/60 (06-20-19 @ 07:19) (109/- - 121/60)  BP(mean): --  ABP: --  ABP(mean): --  RR: 17 (06-20-19 @ 05:33) (16 - 17)  SpO2: 95% (06-20-19 @ 05:33) (95% - 100%)  CVP(mm Hg): --  CVP(cm H2O): --    Ins and Outs     06-19-19 @ 07:01  -  06-20-19 @ 07:00  --------------------------------------------------------  IN: 620 mL / OUT: 0 mL / NET: 620 mL        Height (cm): 172.72 (06-20-19 @ 07:19)  Weight (kg): 64.6 (06-20-19 @ 07:19)  BMI (kg/m2): 21.7 (06-20-19 @ 07:19)        I&O's Detail    19 Jun 2019 07:01  -  20 Jun 2019 07:00  --------------------------------------------------------  IN:    Oral Fluid: 620 mL  Total IN: 620 mL    OUT:  Total OUT: 0 mL    Total NET: 620 mL ICU CONSULT  Location of Patient :  TELE 0225 D1 ( TELE)  Attending requesting Consult:Albino Russo  Chief Complaint : Cellulitis  Reason For consult : Bradycardia      Initial HPI on admission:  HPI:  89 year old female with Chronic Afib on coumadin on BB - Lopressor XL 100mg, Dig - 0.125mcg , Chronic Diastolic CHF, VHD s/p AVR Mechanical s/p MV Clip, MGUS, GERD who p/w LLE LE Cellulitis 6/4/19.   Hospital course complicated with bacteremia and possible endocarditis.   now s/p PICC on IV cefepime.   LLE Cellulitis improving      at this time patient feeling well  noted when sleeping HR remains in 30s, but when awoken HR increases to 40-50s    Tele Reviewed:  Bradycardia started 6/19 on tele where showed HR going to 30s  Overnight had 2.2-2.8 sec ? Pause vs slow Afib  Tele and EKG show Afib with slow ventricular response.           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  H/O aortic valve replacement: 2000-mechanical valve-on Coumadin    Allergies    chlorhexidine containing compounds (Unknown)  Originally Entered as [Unknown] reaction to [WIPES] (Unknown)  penicillin (Faint)  AMADO Chlorhexidine wipes (Hives; Rash)    Intolerances      FAMILY HISTORY:  No significant family history  Denies cardiac history in family    Social history:      Smoking: Denies     Drinking: Denies     Drug use: Denies    Review of Systems:  CONSTITUTIONAL: No fever, No chills, No fatigue  EYES: No eye pain, No visual disturbances, No discharge  ENMT:  No difficulty hearing, No tinnitus, No vertigo; No sinus or throat pain, No Dizziness  NECK: No pain or stiffness  RESPIRATORY: Mild SOB improving, No Cough,   CARDIOVASCULAR: No chest pain, No palpitations, No dizziness, or Chronic leg swelling improving  GASTROINTESTINAL: No abdominal or epigastric pain. No nausea, No vomiting, No hematemesis; No diarrhea or constipation. No melena, No hematochezia.  GENITOURINARY: No dysuria, No frequency, No hematuria, No incontinence  NEUROLOGICAL: No headaches, No memory loss, No loss of strength, No numbness, No tremors  SKIN: No itching, No burning, No rashes, No lesions   MUSCULOSKELETAL: No joint pain or swelling; No muscle, back, No extremity pain  PSYCHIATRIC: No depression, No anxiety, No mood swings, No difficulty sleeping    Medications:  MEDICATIONS  (STANDING):  aspirin enteric coated 81 milliGRAM(s) Oral daily  atorvastatin 80 milliGRAM(s) Oral at bedtime  cefepime   IVPB 2000 milliGRAM(s) IV Intermittent daily  escitalopram 5 milliGRAM(s) Oral daily  furosemide    Tablet 20 milliGRAM(s) Oral daily  mirtazapine 15 milliGRAM(s) Oral at bedtime  warfarin 5 milliGRAM(s) Oral once    MEDICATIONS  (PRN):  acetaminophen   Tablet .. 650 milliGRAM(s) Oral every 6 hours PRN Temp greater or equal to 38C (100.4F), Mild Pain (1 - 3)  sodium chloride 0.9% lock flush 10 milliLiter(s) IV Push every 1 hour PRN Pre/post blood products, medications, blood draw, and to maintain line patency      Home Medications:  Last Order Reconciliation Date: 06-04-19 @ 15:03 (Admission Reconciliation)  aspirin 81 mg oral tablet: 1 tab(s) orally once a day (10-29-18 @ 06:51)  Coumadin 2 mg oral tablet: 1 tab(s) orally once a day (11-25-15 @ 13:23)  Coumadin 5 mg oral tablet: 1 tab(s) orally 4 times a week- Last dose on 05/06/2016 (05-28-16 @ 10:52)  Coumadin 5 mg oral tablet: 1 tab(s) orally 4 times a week (02-06-18 @ 10:29)  Coumadin 5 mg oral tablet: 1 tab(s) orally once a day (at bedtime) (10-29-18 @ 06:51)  Coumadin 7.5 mg oral tablet: 1 tab(s) orally 3 times a week (02-06-18 @ 10:29)  Crestor 20 mg oral tablet: 1 tab(s) orally once a day (at bedtime) (10-29-18 @ 06:51)  digoxin 125 mcg (0.125 mg) oral tablet: 1 tab(s) orally every other day (at bedtime)  (10-29-18 @ 06:51)  docusate sodium 100 mg oral capsule: 1 cap(s) orally 3 times a day, As Needed - for constipation (02-06-18 @ 10:29)  escitalopram 5 mg oral tablet: 1 tab(s) orally once a day (06-04-19 @ 15:02)  ferrous sulfate 325 mg (65 mg elemental iron) oral tablet: 1 tab(s) orally 2 times a week Monday and Friday (10-29-18 @ 06:51)  furosemide 40 mg oral tablet: 1 tab(s) orally 5 times a week (10-29-18 @ 06:51)  furosemide 40 mg oral tablet: 2 tab(s) orally once a day (10-29-18 @ 06:51)  hydrochlorothiazide 12.5 mg oral tablet: 1 tab(s) orally once a day (02-06-18 @ 10:29)  lisinopril 10 mg oral tablet: 1 tab(s) orally once a day (05-28-16 @ 10:52)  metoprolol succinate 100 mg oral tablet, extended release: 1 tab(s) orally 2 times a day (10-29-18 @ 06:51)  metoprolol succinate 100 mg oral tablet, extended release: 1 tab(s) orally once a day (10-29-18 @ 06:51)  Metoprolol Succinate ER 50 mg oral tablet, extended release: 1.5 tab(s) orally 2 times a day (05-28-16 @ 10:52)  metoprolol tartrate 50 mg oral tablet: 1 tab(s) orally 2 times a day (02-06-18 @ 10:29)  MiraLax oral powder for reconstitution: 1 dose(s) orally once a day (02-06-18 @ 10:29)  pantoprazole 40 mg oral delayed release tablet: 1 tab(s) orally once a day (before a meal) (02-06-18 @ 10:29)  pantoprazole 40 mg oral delayed release tablet: 1 tab(s) orally once a day (10-29-18 @ 06:51)  potassium chloride 10 mEq oral capsule, extended release: 2 cap(s) orally once a day (10-29-18 @ 06:51)  PriLOSEC OTC 20 mg oral delayed release tablet: 1 tab(s) orally once a day (05-24-16 @ 06:32)  Remeron 15 mg oral tablet: 1 tab(s) orally once a day (at bedtime) (06-04-19 @ 15:02)  Slow-Mag 119 mg-71.5 mg oral delayed release tablet: 1 tab(s) orally 2 times a day (05-02-16 @ 15:46)  sucralfate 1 g oral tablet: 1 tab(s) orally 2 times a day (06-04-19 @ 14:58)  Tylenol 325 mg oral capsule: 2 tab(s) orally every 6 hours, As Needed mild pain (02-06-18 @ 10:29)      LABS:                        11.5   7.59  )-----------( 222      ( 20 Jun 2019 05:00 )             37.9     06-20    139  |  107  |  36<H>  ----------------------------<  73  4.4   |  23  |  1.35<H>    Ca    9.2      20 Jun 2019 05:00    Trend Bun/Cr  06-20-19 @ 05:00  BUN/CR -  36<H> / 1.35<H>  06-18-19 @ 05:20  BUN/CR -  40<H> / 1.46<H>    Digoxin Level, Serum: 1.4 ng/mL (06.17.19 @ 06:25)                PT/INR - ( 20 Jun 2019 05:00 )   PT: 19.4 sec;   INR: 1.70 ratio         CULTURES: (if applicable)    Culture - Blood (collected 06-13-19 @ 09:45)  Source: .Blood Blood-Venous  Final Report (06-18-19 @ 14:00):    No growth at 5 days.    Culture - Blood (collected 06-09-19 @ 20:35)  Source: .Blood Blood  Final Report (06-15-19 @ 01:01):    No growth at 5 days.    Culture - Blood (collected 06-09-19 @ 20:35)  Source: .Blood Blood  Final Report (06-15-19 @ 01:01):    No growth at 5 days.    Culture - Blood (collected 06-08-19 @ 06:23)  Source: .Blood Blood  Gram Stain (06-09-19 @ 02:56):    Growth in aerobic bottle: Gram Negative Rods  Final Report (06-10-19 @ 17:41):    Growth in aerobic bottle: Pseudomonas aeruginosa    "Due to technical problems, Proteus sp. will Not be reported as part of    the BCID panel until further notice"    ***Blood Panel PCR results on this specimen are available    approximately 3 hours after the Gram stain result.***    Gram stain, PCR, and/or culture results may not always    correspond due to difference in methodologies.    ************************************************************    This PCR assay was performed using [email or web address].    Thefollowing targets are tested for: Enterococcus,    vancomycin resistant enterococci, Listeria monocytogenes,    coagulase negative staphylococci, S. aureus,    methicillin resistant S. aureus, Streptococcus agalactiae    (Group B), S. pneumoniae, S. pyogenes(Group A),    Acinetobacter baumannii, Enterobacter cloacae, E. coli,    Klebsiella oxytoca, K. pneumoniae, Proteus sp.,    Serratia marcescens, Haemophilus influenzae,    Neisseria meningitidis, Pseudomonas aeruginosa, Candida    albicans, C. glabrata, C krusei, C parapsilosis,    C. tropicalis and the KPC resistance gene.  Organism: Blood Culture PCR  Pseudomonas aeruginosa (06-10-19 @ 17:41)  Organism: Pseudomonas aeruginosa (06-10-19 @ 17:41)      -  Amikacin: S <=8      -  Aztreonam: S <=4      -  Cefepime: S <=2      -  Ceftazidime: S 4      -  Ciprofloxacin: S <=0.5      -  Gentamicin: S 4      -  Imipenem: S <=1      -  Levofloxacin: S <=1      -  Meropenem: S <=1      -  Piperacillin/Tazobactam: S <=8      -  Tobramycin: S <=2      Method Type: MIRIAM  Organism: Blood Culture PCR (06-10-19 @ 17:41)      -  Pseudomonas aeruginosa: Detec      Method Type: PCR    Culture - Blood (collected 06-08-19 @ 06:23)  Source: .Blood Blood  Gram Stain (06-09-19 @ 03:59):    Growth in aerobic bottle: Gram Negative Rods  Final Report (06-10-19 @ 17:44):    Growth in aerobic bottle: Pseudomonas aeruginosa    See previous culture [id number]            CAPILLARY BLOOD GLUCOSE          RADIOLOGY  CXR:  < from: Xray Chest 1 View- PORTABLE-Urgent (06.16.19 @ 11:44) >  IMPRESSION:  Interval placement of a right PICC with tip near the superior cavoatrial junction.    Pulmonary vascular congestion and pulmonary edema have improved since the prior study. Superimposed pneumonia cannot be excluded.    Mild to moderate bilateral pleural effusions.    < end of copied text >      CT:  < from: CT Chest No Cont (06.11.19 @ 15:49) >  IMPRESSION:     No pneumonia.    Pulmonary edema with moderate right and small left pleural effusion.    3.0 cm hematoma in the right pectoralis muscle with hemorrhagic fluid level.    No evidence of intra-abdominal or pelvic sepsis.    Stable masslike thickening of the sigmoid colon. Differential includes neoplasm, colitis, or chronic diverticular disease. Colonoscopy with biopsy is needed for a definitive diagnosis.        < end of copied text >        ECHO:  < from: TTE Echo Complete w/Doppler (06.06.19 @ 11:39) >  Summary:   1. Left ventricular ejection fraction, by visual estimation, is 60%.   2. Normal global left ventricular systolic function.   3. Spectral Doppler shows restrictive pattern of left ventricular myocardial filling (Grade III diastolic dysfunction).   4. D systolic flattening consistent with severe pulmonary hypertenisn.   5. Severely enlarged right ventricle.   6. Severe tricuspid regurgitation.   7. Aortic valve is normally functioning mechanical prosthetic valve.   8. Mechanical prosthesis in the aortic valve position.   9. Moderate pulmonic valve regurgitation.  10. Estimated pulmonary artery systolic pressure is 135.6 mmHg assuming a right atrial pressure of 15 mmHg, which is consistent with severe pulmonary hypertension.  11. Severe pulmonary hypertension is present.  12. Moderate interatrial left to right shunt consider a PFO a patent formaen ovale.  13. Bernoulli equation may be inaccurate in the setting of severe TR. elevated qp/qs ratio >2 with right heart enlargement suggests a signicant intracardiac shunt.  14. Biatrial enlargement suggests restrictive physiology.  15. Aortic doppler consistent with a mature aortic vavle prosthesis.    < end of copied text >          VITALS:  T(C): 36.3 (06-20-19 @ 05:33), Max: 36.5 (06-19-19 @ 16:08)  T(F): 97.4 (06-20-19 @ 05:33), Max: 97.7 (06-19-19 @ 16:08)  HR: 50 (06-20-19 @ 07:19) (44 - 85)  BP: 116/60 (06-20-19 @ 07:19) (109/- - 121/60)  BP(mean): --  ABP: --  ABP(mean): --  RR: 17 (06-20-19 @ 05:33) (16 - 17)  SpO2: 95% (06-20-19 @ 05:33) (95% - 100%)  CVP(mm Hg): --  CVP(cm H2O): --    Ins and Outs     06-19-19 @ 07:01  -  06-20-19 @ 07:00  --------------------------------------------------------  IN: 620 mL / OUT: 0 mL / NET: 620 mL        Height (cm): 172.72 (06-20-19 @ 07:19)  Weight (kg): 64.6 (06-20-19 @ 07:19)  BMI (kg/m2): 21.7 (06-20-19 @ 07:19)        I&O's Detail    19 Jun 2019 07:01  -  20 Jun 2019 07:00  --------------------------------------------------------  IN:    Oral Fluid: 620 mL  Total IN: 620 mL    OUT:  Total OUT: 0 mL    Total NET: 620 mL

## 2019-06-20 NOTE — PROGRESS NOTE ADULT - SUBJECTIVE AND OBJECTIVE BOX
Patient is a 89y old  Female who presents with a chief complaint of left lower extremity cellulitis (19 Jun 2019 17:43)      INTERVAL HPI/OVERNIGHT EVENTS:  alert      REVIEW OF SYSTEMS:  CONSTITUTIONAL: No fever, weight loss, or fatigue  EYES: No eye pain, visual disturbances, or discharge  ENMT:  No difficulty hearing, tinnitus, vertigo; No sinus or throat pain  NECK: No pain or stiffness  BREASTS: No pain, masses, or nipple discharge  RESPIRATORY: No cough, wheezing, chills or hemoptysis; No shortness of breath  CARDIOVASCULAR: No chest pain, palpitations, dizziness, or leg swelling  GASTROINTESTINAL: No abdominal or epigastric pain. No nausea, vomiting, or hematemesis; No diarrhea or constipation. No melena or hematochezia.  GENITOURINARY: No dysuria, frequency, hematuria, or incontinence  NEUROLOGICAL: No headaches, memory loss, loss of strength, numbness, or tremors  SKIN: No itching, burning, rashes, or lesions   LYMPH NODES: No enlarged glands  ENDOCRINE: No heat or cold intolerance; No hair loss  MUSCULOSKELETAL: No joint pain or swelling; No muscle, back, or extremity pain  PSYCHIATRIC: No depression, anxiety, mood swings, or difficulty sleeping  HEME/LYMPH: No easy bruising, or bleeding gums  ALLERY AND IMMUNOLOGIC: No hives or eczema  FAMILY HISTORY:  No significant family history    T(C): 36.3 (06-20-19 @ 05:33), Max: 36.5 (06-19-19 @ 16:08)  HR: 44 (06-20-19 @ 05:33) (44 - 85)  BP: 114/59 (06-20-19 @ 05:33) (109/- - 121/60)  RR: 17 (06-20-19 @ 05:33) (16 - 17)  SpO2: 95% (06-20-19 @ 05:33) (95% - 100%)  Wt(kg): --Vital Signs Last 24 Hrs  T(C): 36.3 (20 Jun 2019 05:33), Max: 36.5 (19 Jun 2019 16:08)  T(F): 97.4 (20 Jun 2019 05:33), Max: 97.7 (19 Jun 2019 16:08)  HR: 44 (20 Jun 2019 05:33) (44 - 85)  BP: 114/59 (20 Jun 2019 05:33) (109/- - 121/60)  BP(mean): --  RR: 17 (20 Jun 2019 05:33) (16 - 17)  SpO2: 95% (20 Jun 2019 05:33) (95% - 100%)    T(F): 97.4 (06-20-19 @ 05:33), Max: 97.7 (06-18-19 @ 16:00)  HR: 44 (06-20-19 @ 05:33) (44 - 100)  BP: 114/59 (06-20-19 @ 05:33) (96/49 - 121/60)  RR: 17 (06-20-19 @ 05:33) (15 - 17)  SpO2: 95% (06-20-19 @ 05:33) (93% - 100%)    PHYSICAL EXAM:  GENERAL: NAD, well-groomed, well-developed  HEAD:  Atraumatic, Normocephalic  EYES: EOMI, PERRLA, conjunctiva and sclera clear  ENMT: No tonsillar erythema, exudates, or enlargement; Moist mucous membranes, Good dentition, No lesions  NECK: Supple, No JVD, Normal thyroid  NERVOUS SYSTEM:  Alert & Oriented X3, Good concentration; Motor Strength 5/5 B/L upper and lower extremities; DTRs 2+ intact and symmetric  CHEST/LUNG: Clear to percussion bilaterally; No rales, rhonchi, wheezing, or rubs  HEART: Regular rate and rhythm; No murmurs, rubs, or gallops  ABDOMEN: Soft, Nontender, Nondistended; Bowel sounds present  EXTREMITIES:  2+ Peripheral Pulses, No clubbing, cyanosis, or edema  LYMPH: No lymphadenopathy noted  SKIN: No rashes or lesions    Consultant(s) Notes Reviewed:  [x ] YES  [ ] NO  Care Discussed with Consultants/Other Providers [ x] YES  [ ] NO    LABS:                              11.5   7.59  )-----------( 222      ( 20 Jun 2019 05:00 )             37.9         PT/INR - ( 19 Jun 2019 05:36 )   PT: 19.6 sec;   INR: 1.72 ratio                              11.5   7.59  )-----------( 222      ( 06-20 @ 05:00 )             37.9                10.5   8.66  )-----------( 249      ( 06-18 @ 05:20 )             33.6                11.3   9.56  )-----------( 284      ( 06-15 @ 11:00 )             36.4                12.2   8.35  )-----------( 211      ( 06-14 @ 06:19 )             38.5                10.4   10.58 )-----------( 279      ( 06-13 @ 05:10 )             33.1                10.5   8.86  )-----------( 275      ( 06-12 @ 07:14 )             32.4                11.2   10.28 )-----------( 292      ( 06-11 @ 13:35 )             34.3                12.7   12.95 )-----------( 237      ( 06-09 @ 10:50 )             39.5                11.9   10.86 )-----------( 249      ( 06-08 @ 06:23 )             37.2                11.7   9.02  )-----------( 242      ( 06-06 @ 05:05 )             35.8                11.3   9.31  )-----------( 187      ( 06-05 @ 06:55 )             34.3                13.1   10.51 )-----------( 196      ( 06-04 @ 13:15 )             39.6               RADIOLOGY & ADDITIONAL TESTS:    Imaging Personally Reviewed:  [ ] YES  [ ] NO  acetaminophen   Tablet .. 650 milliGRAM(s) Oral every 6 hours PRN  aspirin enteric coated 81 milliGRAM(s) Oral daily  atorvastatin 80 milliGRAM(s) Oral at bedtime  cefepime   IVPB 2000 milliGRAM(s) IV Intermittent daily  escitalopram 5 milliGRAM(s) Oral daily  furosemide    Tablet 20 milliGRAM(s) Oral daily  metoprolol tartrate 25 milliGRAM(s) Oral two times a day  mirtazapine 15 milliGRAM(s) Oral at bedtime  sodium chloride 0.9% lock flush 10 milliLiter(s) IV Push every 1 hour PRN      HEALTH ISSUES - PROBLEM Dx:  Sigmoid thickening: Sigmoid thickening  Bacteremia: Bacteremia  Hypokalemia: Hypokalemia  Essential hypertension: Essential hypertension  Anxiety: Anxiety  Atrial fibrillation, unspecified type: Atrial fibrillation, unspecified type  Aortic valve disease: Aortic valve disease  Skin ulcer, unspecified ulcer stage: Skin ulcer, unspecified ulcer stage  HENRY (acute kidney injury): HENRY (acute kidney injury)  Cellulitis of left leg: Cellulitis of left leg

## 2019-06-20 NOTE — PROGRESS NOTE ADULT - ATTENDING COMMENTS
INR 2.03   anticipate under 2 tomorrow and then PICC line can be placed
endocarditis  no new recommendations      bradycardia  consider dc digoxin
endocarditis  plan long term antibiotics.
hypokalemia   slowly resolving consider addition of a k sparing diuretic such as aldactone.      bacteremia  of concern given indwelling prosthetic material await speciation.    afib  ? anticoagulation candidate
infectious endocarditis  continue long term antibiotics.     abnormal ct scan  consider gi source for gram negative sol patient informed    afib  anticoagulate  target inr 2-3
prosthetic endocarditis  would continue to treat as if prosthetic endocarditis given persistent positive cultures in the setting of prosthetic material. persistence of positive cultures makes cardiac prognosis guarded. 6/9 pseudomonas ? would defer to id. await repeat blood cultures.
prosthetic valve endocarditis  typical organism in the setting of prosthetic material will be treated as prosthetic valve endocarditis until proven otherwise. agree with long term antibiotics    severe pulmonary hypertension  supportive measures. most likely cause of interatrial flow is due to a ?septal puncture for an invasive cardiac procedure.     afib  on anticoagulation. cautions given concern over a marantic endocarditis, however no current neurologic changes to suggest. risk benefit ratio of anticoagulation is addressed in this setting.

## 2019-06-20 NOTE — PROGRESS NOTE ADULT - SUBJECTIVE AND OBJECTIVE BOX
Follow up for  SUBJ:    no current complaints      PMH  Aortic valve disease  Non-rheumatic mitral regurgitation  HLD (hyperlipidemia)  Melanoma  Diverticulosis of small intestine without hemorrhage  Afib  Anxiety  Essential hypertension      MEDICATIONS  (STANDING):  aspirin enteric coated 81 milliGRAM(s) Oral daily  atorvastatin 80 milliGRAM(s) Oral at bedtime  cefepime   IVPB 2000 milliGRAM(s) IV Intermittent daily  escitalopram 5 milliGRAM(s) Oral daily  furosemide    Tablet 20 milliGRAM(s) Oral daily  mirtazapine 15 milliGRAM(s) Oral at bedtime  warfarin 5 milliGRAM(s) Oral once    MEDICATIONS  (PRN):  acetaminophen   Tablet .. 650 milliGRAM(s) Oral every 6 hours PRN Temp greater or equal to 38C (100.4F), Mild Pain (1 - 3)  ondansetron Injectable 4 milliGRAM(s) IV Push every 12 hours PRN Nausea and/or Vomiting  sodium chloride 0.9% lock flush 10 milliLiter(s) IV Push every 1 hour PRN Pre/post blood products, medications, blood draw, and to maintain line patency        PHYSICAL EXAM:  Vital Signs Last 24 Hrs  T(C): 36.4 (20 Jun 2019 15:19), Max: 36.5 (19 Jun 2019 20:48)  T(F): 97.5 (20 Jun 2019 15:19), Max: 97.7 (19 Jun 2019 20:48)  HR: 74 (20 Jun 2019 15:19) (44 - 84)  BP: 99/54 (20 Jun 2019 15:19) (99/54 - 119/58)  BP(mean): --  RR: 16 (20 Jun 2019 15:19) (16 - 17)  SpO2: 94% (20 Jun 2019 15:53) (87% - 98%)    GENERAL: NAD, well-groomed, well-developed  HEAD:  Atraumatic, Normocephalic  EYES: EOMI, PERRLA, conjunctiva and sclera clear  ENT: Moist mucous membranes,  NECK: Supple, No JVD, no bruits  CHEST/LUNG: Clear to percussion bilaterally; No rales, rhonchi, wheezing, or rubs  HEART: Regular rate and rhythm; No murmurs, rubs, or gallops PMI non displaced.  ABDOMEN: Soft, Nontender, Nondistended; Bowel sounds present  EXTREMITIES:  2+ Peripheral Pulses, No clubbing, cyanosis, or edema  SKIN: No rashes or lesions  NERVOUS SYSTEM:  Cranial Nerves II-XII intact      TELEMETRY:    af    ECG:    < from: 12 Lead ECG (06.20.19 @ 09:37) >  Ventricular Rate 70 BPM    Atrial Rate 86 BPM    QRS Duration 96 ms    Q-T Interval 344 ms    QTC Calculation(Bezet) 371 ms    R Axis 129 degrees    T Axis -61 degrees    Diagnosis Line Atrial fibrillation with a competing junctional pacemaker  Right axis deviation  Incomplete right bundle branch block  Possible Right ventricular hypertrophy  Counterclockwise rotation  Septal infarct (cited on or before 15-LEONIDAS-2019)  ST and T wave abnormality, consider inferior ischemia  ST and T wave abnormality, consider anterior ischemia  Abnormal ECG  When compared with ECG of 15-LEONIDAS-2019 21:35,  Serial changes of Septal infarct present    Confirmed by ESSENCE SIMEON MD ([id number]) on 6/20/2019 9:49:19 AM    < end of copied text >    ECHO:    LABS:                        11.5   7.59  )-----------( 222      ( 20 Jun 2019 05:00 )             37.9     06-20    139  |  107  |  36<H>  ----------------------------<  73  4.4   |  23  |  1.35<H>    Ca    9.2      20 Jun 2019 05:00  Phos  2.9     06-20  Mg     1.8     06-20          PT/INR - ( 20 Jun 2019 05:00 )   PT: 19.4 sec;   INR: 1.70 ratio             I&O's Summary    19 Jun 2019 07:01  -  20 Jun 2019 07:00  --------------------------------------------------------  IN: 620 mL / OUT: 0 mL / NET: 620 mL    20 Jun 2019 07:01  -  20 Jun 2019 17:18  --------------------------------------------------------  IN: 200 mL / OUT: 100 mL / NET: 100 mL      BNP    RADIOLOGY & ADDITIONAL STUDIES:    ECHO:

## 2019-06-20 NOTE — GOALS OF CARE CONVERSATION - PERSONAL ADVANCE DIRECTIVE - CONVERSATION DETAILS
Met again with patient, sons, and private hire regarding her goals of care. Pt. states she wants full code in the event of cardiac arrest or cardiopulmonary decompensation. I discussed the risks/benefits of CPR; possibility or broken ribs and difficulty weaning off respirator. Pt. very clear that despite these risks " if there is any chance of life, I want to take it".
Met with patient at bedside, pt. sitting up in recliner. A&Ox3. private aide Mariya also present. Discussed goals of care and Advanced Directives. Pt. states her son Tavo is her HCP, asked if he could bring in copy for hospital medical record. Asked pt. what her wishes would be in the event of cardiac arrest or cardiopulmonary decompensation. She stated she is unsure, asked that I leave MOLST form so that she could review with son. Will follow up when son visits later today.
Follow up note regarding goals of care and Advanced Directives. I had spoken with patient two days ago, and she asked that I speak to her son about the documents she has. Son now at bedside, brought in Living Will and HCP form. Copies made for medical record. Patient and son agree she wants to be full code. Son Tavo is HCP. NAYE reviewed, but son does not feel it is necessary right now since she is Full Code.

## 2019-06-20 NOTE — CONSULT NOTE ADULT - ASSESSMENT
Physical Examination:  GENERAL:               Alert, Oriented, No acute distress.    HEENT:                    Pupils equal, reactive to light.  Symmetric. No JVD, Moist MM  PULM:                     Bilateral air entry, Clear to auscultation bilaterally, no significant sputum production, No Rales, No Rhonchi, No Wheezing  CVS:                         S1, S2,  No Murmur  ABD:                        Soft, nondistended, nontender, normoactive bowel sounds,   EXT:                         No edema, nontender, No Cyanosis or Clubbing   Vascular:                Warm Extremities, Normal Capillary refill, Normal Distal Pulses  SKIN:                       Warm and well perfused, no rashes noted.   NEURO:                  Alert, oriented, interactive, nonfocal, follows commands  PSYC:                      Calm, + Insight.        Assessment    HEALTH ISSUES - PROBLEM Dx:  Bradycardia: Bradycardia  Sigmoid thickening: Sigmoid thickening  Bacteremia: Bacteremia  Hypokalemia: Hypokalemia  Essential hypertension: Essential hypertension  Anxiety: Anxiety  Atrial fibrillation, unspecified type: Atrial fibrillation, unspecified type  Aortic valve disease: Aortic valve disease  Skin ulcer, unspecified ulcer stage: Skin ulcer, unspecified ulcer stage  HENRY (acute kidney injury): HENRY (acute kidney injury)  Cellulitis of left leg: Cellulitis of left leg        HEALTH ISSUES - R/O PROBLEM Dx:    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  H/O aortic valve replacement: 2000-mechanical valve-on Coumadin        Plan          PMD:				                   Notified(Date):  Family Updated: 		                                 Date:       Sedation & Analgesia:	  Diet/Nutrition:		  GI PPx:			    DVT Ppx:		  	RISK                                                          Points  	[ ] Previous VTE                                           	3  	[ ] Thrombophilia                                        	2  	[ ] Lower limb paralysis                              	2   	[ ] Current Cancer                                       	2   	[ ] Immobilization > 24 hrs                        	1  	[ ] ICU/CCU stay > 24 hours                       	1  	[ ] Age > 60                                                   	1  		Total:[ ]      Activity:		                 Head of Bed:                 Glycemic Control:            Lines:  CENTRAL LINE: 	[ ] YES [ ] NO	                    LOCATION:   	                       DATE INSERTED:   	                    REMOVE:  [ ] YES [ ] NO    A-LINE:  	                [ ] YES [ ] NO                      LOCATION:   	                       DATE INSERTED: 		            REMOVE:  [ ] YES [ ] NO    COE: 		        [ ] YES [ ] NO  		                                       DATE INSERTED:		            REMOVE:  [ ] YES [ ] NO      Restraints were deemed necessary to prevent removal of life-sustaining devices [  ] YES   [    ]  NO    Disposition:    Goals of Care: Physical Examination:  GENERAL:               Alert, Oriented, No acute distress.    HEENT:                    No JVD, Dry MM  PULM:                     Bilateral air entry, Clear to auscultation bilaterally, no significant sputum production, trace Rales, No Rhonchi, No Wheezing  CVS:                         S1, S2,  6/6 + Murmur  ABD:                        Soft, nondistended, nontender, normoactive bowel sounds,   EXT:                         B.L chronic venous stasis, mild edema, LLE erythema no warmth, nontender, No Cyanosis or Clubbing   Vascular:                Warm Extremities, Normal Capillary refill, Normal Distal Pulses.   NEURO:                  Alert, oriented, interactive, nonfocal, follows commands  PSYC:                      Calm, + Insight.        Assessment  Atrial fib with slow Ventricular response ? due to BB/Dig use    Cellulitis improving/resolved with Pseudomonas Bacteremia with presumed endocarditis on abx as per ID repeat cultures negative  Abnormal Echo: with Grade 3 Diastolic CHF, Severe RV enlargement TR regurgitation, S/p AVR, Mod interatrial left to right shunt - Patent Foramen ovale   Abnormal CT: Sigmoid thickening: Sigmoid thickening  Chronic Diastolic CHF, VHD s/p AVR Mechanical s/p MV Clip, MGUS, GERD    Plan  At This time will hold Dig/Lopressor  awaiting Dig level  Will give one dose of glucagon  Cardio eval called by floor team     unsure patient good candidate for PPM at this time,   Check CXR  Dose coumadin keep INR 2-3    Abnormal CT findings - as per Primary team. if candidate for Colonoscopy       PMD:	Femi Carroll 			                   Notified(Date): 6/20        Sedation & Analgesia:	n/a  Diet/Nutrition:		Oral diet  GI PPx:			PPI    DVT Ppx:		Coumadin      Activity:		               bedrest  Head of Bed:               35-45 deg  Glycemic Control:        n/a    Lines: PIV  Restraints were deemed necessary to prevent removal of life-sustaining devices [  ] YES   [  x  ]  NO    Disposition: At this time will monitor patient on tele pending Glucagon administration and Dig level returning.    Goals of Care: Full code

## 2019-06-20 NOTE — PROGRESS NOTE ADULT - ASSESSMENT
Patient is a 89y old  Female who presents with a chief complaint of left lower extremity cellulitis (19 Jun 2019 17:43)      INTERVAL HPI/OVERNIGHT EVENTS:      REVIEW OF SYSTEMS:  CONSTITUTIONAL: No fever, weight loss, or fatigue  EYES: No eye pain, visual disturbances, or discharge  ENMT:  No difficulty hearing, tinnitus, vertigo; No sinus or throat pain  NECK: No pain or stiffness  BREASTS: No pain, masses, or nipple discharge  RESPIRATORY: No cough, wheezing, chills or hemoptysis; No shortness of breath  CARDIOVASCULAR: No chest pain, palpitations, dizziness, or leg swelling  GASTROINTESTINAL: No abdominal or epigastric pain. No nausea, vomiting, or hematemesis; No diarrhea or constipation. No melena or hematochezia.  GENITOURINARY: No dysuria, frequency, hematuria, or incontinence  NEUROLOGICAL: No headaches, memory loss, loss of strength, numbness, or tremors  SKIN: No itching, burning, rashes, or lesions   LYMPH NODES: No enlarged glands  ENDOCRINE: No heat or cold intolerance; No hair loss  MUSCULOSKELETAL: No joint pain or swelling; No muscle, back, or extremity pain  PSYCHIATRIC: No depression, anxiety, mood swings, or difficulty sleeping  HEME/LYMPH: No easy bruising, or bleeding gums  ALLERY AND IMMUNOLOGIC: No hives or eczema  FAMILY HISTORY:  No significant family history    T(C): 36.3 (06-20-19 @ 05:33), Max: 36.5 (06-19-19 @ 16:08)  HR: 44 (06-20-19 @ 05:33) (44 - 85)  BP: 114/59 (06-20-19 @ 05:33) (109/- - 121/60)  RR: 17 (06-20-19 @ 05:33) (16 - 17)  SpO2: 95% (06-20-19 @ 05:33) (95% - 100%)  Wt(kg): --Vital Signs Last 24 Hrs  T(C): 36.3 (20 Jun 2019 05:33), Max: 36.5 (19 Jun 2019 16:08)  T(F): 97.4 (20 Jun 2019 05:33), Max: 97.7 (19 Jun 2019 16:08)  HR: 44 (20 Jun 2019 05:33) (44 - 85)  BP: 114/59 (20 Jun 2019 05:33) (109/- - 121/60)  BP(mean): --  RR: 17 (20 Jun 2019 05:33) (16 - 17)  SpO2: 95% (20 Jun 2019 05:33) (95% - 100%)    T(F): 97.4 (06-20-19 @ 05:33), Max: 97.7 (06-18-19 @ 16:00)  HR: 44 (06-20-19 @ 05:33) (44 - 100)  BP: 114/59 (06-20-19 @ 05:33) (96/49 - 121/60)  RR: 17 (06-20-19 @ 05:33) (15 - 17)  SpO2: 95% (06-20-19 @ 05:33) (93% - 100%)    PHYSICAL EXAM:  GENERAL: NAD, well-groomed, well-developed  HEAD:  Atraumatic, Normocephalic  EYES: EOMI, PERRLA, conjunctiva and sclera clear  ENMT: No tonsillar erythema, exudates, or enlargement; Moist mucous membranes, Good dentition, No lesions  NECK: Supple, No JVD, Normal thyroid  NERVOUS SYSTEM:  Alert & Oriented X3, Good concentration; Motor Strength 5/5 B/L upper and lower extremities; DTRs 2+ intact and symmetric  CHEST/LUNG: Clear to percussion bilaterally; No rales, rhonchi, wheezing, or rubs  HEART: Regular rate and rhythm; No murmurs, rubs, or gallops  ABDOMEN: Soft, Nontender, Nondistended; Bowel sounds present  EXTREMITIES:  2+ Peripheral Pulses, No clubbing, cyanosis, or edema  LYMPH: No lymphadenopathy noted  SKIN: No rashes or lesions    Consultant(s) Notes Reviewed:  [x ] YES  [ ] NO  Care Discussed with Consultants/Other Providers [ x] YES  [ ] NO    LABS:                              11.5   7.59  )-----------( 222      ( 20 Jun 2019 05:00 )             37.9         PT/INR - ( 19 Jun 2019 05:36 )   PT: 19.6 sec;   INR: 1.72 ratio                              11.5   7.59  )-----------( 222      ( 06-20 @ 05:00 )             37.9                10.5   8.66  )-----------( 249      ( 06-18 @ 05:20 )             33.6                11.3   9.56  )-----------( 284      ( 06-15 @ 11:00 )             36.4                12.2   8.35  )-----------( 211      ( 06-14 @ 06:19 )             38.5                10.4   10.58 )-----------( 279      ( 06-13 @ 05:10 )             33.1                10.5   8.86  )-----------( 275      ( 06-12 @ 07:14 )             32.4                11.2   10.28 )-----------( 292      ( 06-11 @ 13:35 )             34.3                12.7   12.95 )-----------( 237      ( 06-09 @ 10:50 )             39.5                11.9   bradycardia note   will hold metoprolol and digoxin.....cardiology to  comment

## 2019-06-20 NOTE — PROGRESS NOTE ADULT - SUBJECTIVE AND OBJECTIVE BOX
CC: f/u for polymicrobial bacteremia    Patient reports: she is sleepy at present, episode of bradycardia and medication adjustment noted.    REVIEW OF SYSTEMS:  All other review of systems negative (Comprehensive ROS)    Antimicrobials Day #  :day 16/42  cefepime   IVPB 2000 milliGRAM(s) IV Intermittent daily    Other Medications Reviewed    T(F): 97.4 (06-20-19 @ 05:33), Max: 97.7 (06-19-19 @ 16:08)  HR: 50 (06-20-19 @ 07:19)  BP: 116/60 (06-20-19 @ 07:19)  RR: 17 (06-20-19 @ 05:33)  SpO2: 95% (06-20-19 @ 05:33)  Wt(kg): --    PHYSICAL EXAM:  General: sleepy, no acute distress  Eyes:  anicteric, no conjunctival injection, no discharge  Oropharynx: no lesions or injection 	  Neck: supple, without adenopathy  Lungs: diminished at bases  Heart: irregular rate and rhythm; +harvey  Abdomen: soft, nondistended, nontender, without mass or organomegaly  Skin: no lesions  Extremities: no clubbing, cyanosis, or edema  Neurologic: lethargic,, oriented, moves all extremities    LAB RESULTS:                        11.5   7.59  )-----------( 222      ( 20 Jun 2019 05:00 )             37.9     06-20    139  |  107  |  36<H>  ----------------------------<  73  4.4   |  23  |  1.35<H>    Ca    9.2      20 Jun 2019 05:00  Phos  2.9     06-20  Mg     1.8     06-20          MICROBIOLOGY:  RECENT CULTURES:      RADIOLOGY REVIEWED:  < from: Xray Chest 1 View- PORTABLE-Routine (06.20.19 @ 10:21) >  EXAM:  XR CHEST PORTABLE ROUTINE 1V      PROCEDURE DATE:  06/20/2019        INTERPRETATION:  INDICATION: evaluate CHF    PRIORS: 6/16/2019    VIEWS: Portable AP radiography of the chest performed.    FINDINGS: Since prior evaluation no significant interval change is noted.   The position of the right PICC line is stable. Note is made of a valvular   prosthetic as well as a mitral valve clip. Airspace opacities are   redemonstrated within the bilateral lower lung fields with bilateral   pleural effusions. There is no evidence for pneumothorax. No mediastinal   shift is noted. Degenerative change of the right shoulder region noted.    IMPRESSION: No significant interval change.    < end of copied text >

## 2019-06-20 NOTE — PROGRESS NOTE ADULT - SUBJECTIVE AND OBJECTIVE BOX
Resting    Vital Signs Last 24 Hrs  T(C): 36.4 (06-20-19 @ 15:19), Max: 36.5 (06-19-19 @ 20:48)  T(F): 97.5 (06-20-19 @ 15:19), Max: 97.7 (06-19-19 @ 20:48)  HR: 74 (06-20-19 @ 15:19) (44 - 84)  BP: 99/54 (06-20-19 @ 15:19) (99/54 - 119/58)  RR: 16 (06-20-19 @ 15:19) (16 - 17)  SpO2: 94% (06-20-19 @ 15:53) (87% - 98%)    Card S1S2  Lungs fair air entry b/l  Abd soft, NT, ND  Extr sm edema, erythema LLE resolved                                                                                          11.5   7.59  )-----------( 222      ( 20 Jun 2019 05:00 )             37.9     20 Jun 2019 05:00    139    |  107    |  36     ----------------------------<  73     4.4     |  23     |  1.35     Ca    9.2        20 Jun 2019 05:00  Phos  2.9       20 Jun 2019 05:00  Mg     1.8       20 Jun 2019 05:00    PT/INR - ( 20 Jun 2019 05:00 )   PT: 19.4 sec;   INR: 1.70 ratio      acetaminophen   Tablet .. 650 milliGRAM(s) Oral every 6 hours PRN  aspirin enteric coated 81 milliGRAM(s) Oral daily  atorvastatin 80 milliGRAM(s) Oral at bedtime  cefepime   IVPB 2000 milliGRAM(s) IV Intermittent daily  escitalopram 5 milliGRAM(s) Oral daily  furosemide    Tablet 20 milliGRAM(s) Oral daily  mirtazapine 15 milliGRAM(s) Oral at bedtime  ondansetron Injectable 4 milliGRAM(s) IV Push every 12 hours PRN  sodium chloride 0.9% lock flush 10 milliLiter(s) IV Push every 1 hour PRN  warfarin 5 milliGRAM(s) Oral once    A/P:    Septic/hemodynamic HENRY on CKD 3  Resolved  No nephrotoxins  F/u BMP   Avoid hypotension   Encourage PO fluids  Strep bacteremia  Abx per ID  Continue Lasix

## 2019-06-20 NOTE — PROVIDER CONTACT NOTE (OTHER) - SITUATION
Pt had burst of Hr 29 and HR sustaining in 40s. And also pt is having intermittent pause lasting <3seconds.

## 2019-06-20 NOTE — CHART NOTE - NSCHARTNOTEFT_GEN_A_CORE
Reviewed prior progress notes, labs and imaging.    Discussed with Dr. Russo.    Primary Diagnosis: Left lower extremity cellulitis, concern for endocarditis       Plan:  Plan:   #Cellulitis: found to have Strep salivarius bacteremia  Chronic findings on ECHO, but with concern for endocarditis in this setting  ID consult appreciated: weekly CBC and CMP when discharged   On Day 16/42 cefepime   Plan do D/C home on IV cefepime via PICC (already placed)     #Bradycardia - resolved  Hold digoxin and BB     #AfIB:  INR 1.70  Dose coumadin for tonight  Repeat INR tomorrow  Hold digoxin and BB 2/2 bradycardia (low 40's) overnight  Dig level today = 1.1  therapeutic     #CKD 3:  Avoid nephrotoxic drugs  Monitor BMP    Anticipated Discharge: 24 hrs - discharge home with home O2, hospital bed, comode and infusion RN following

## 2019-06-21 ENCOUNTER — TRANSCRIPTION ENCOUNTER (OUTPATIENT)
Age: 84
End: 2019-06-21

## 2019-06-21 VITALS
SYSTOLIC BLOOD PRESSURE: 112 MMHG | OXYGEN SATURATION: 93 % | HEART RATE: 69 BPM | TEMPERATURE: 97 F | RESPIRATION RATE: 16 BRPM | DIASTOLIC BLOOD PRESSURE: 59 MMHG

## 2019-06-21 LAB
ANION GAP SERPL CALC-SCNC: 12 MMOL/L — SIGNIFICANT CHANGE UP (ref 5–17)
BUN SERPL-MCNC: 36 MG/DL — HIGH (ref 7–23)
CALCIUM SERPL-MCNC: 9.2 MG/DL — SIGNIFICANT CHANGE UP (ref 8.4–10.5)
CHLORIDE SERPL-SCNC: 107 MMOL/L — SIGNIFICANT CHANGE UP (ref 96–108)
CO2 SERPL-SCNC: 23 MMOL/L — SIGNIFICANT CHANGE UP (ref 22–31)
CREAT SERPL-MCNC: 1.38 MG/DL — HIGH (ref 0.5–1.3)
GLUCOSE SERPL-MCNC: 89 MG/DL — SIGNIFICANT CHANGE UP (ref 70–99)
HCT VFR BLD CALC: 37 % — SIGNIFICANT CHANGE UP (ref 34.5–45)
HGB BLD-MCNC: 11.3 G/DL — LOW (ref 11.5–15.5)
INR BLD: 2.09 RATIO — HIGH (ref 0.88–1.16)
MCHC RBC-ENTMCNC: 30.5 GM/DL — LOW (ref 32–36)
MCHC RBC-ENTMCNC: 30.8 PG — SIGNIFICANT CHANGE UP (ref 27–34)
MCV RBC AUTO: 100.8 FL — HIGH (ref 80–100)
NRBC # BLD: 0 /100 WBCS — SIGNIFICANT CHANGE UP (ref 0–0)
PLATELET # BLD AUTO: 242 K/UL — SIGNIFICANT CHANGE UP (ref 150–400)
POTASSIUM SERPL-MCNC: 3.5 MMOL/L — SIGNIFICANT CHANGE UP (ref 3.5–5.3)
POTASSIUM SERPL-SCNC: 3.5 MMOL/L — SIGNIFICANT CHANGE UP (ref 3.5–5.3)
PROTHROM AB SERPL-ACNC: 24 SEC — HIGH (ref 10–12.9)
RBC # BLD: 3.67 M/UL — LOW (ref 3.8–5.2)
RBC # FLD: 22.8 % — HIGH (ref 10.3–14.5)
SODIUM SERPL-SCNC: 142 MMOL/L — SIGNIFICANT CHANGE UP (ref 135–145)
WBC # BLD: 7.12 K/UL — SIGNIFICANT CHANGE UP (ref 3.8–10.5)
WBC # FLD AUTO: 7.12 K/UL — SIGNIFICANT CHANGE UP (ref 3.8–10.5)

## 2019-06-21 PROCEDURE — 99285 EMERGENCY DEPT VISIT HI MDM: CPT | Mod: 25

## 2019-06-21 PROCEDURE — 36415 COLL VENOUS BLD VENIPUNCTURE: CPT

## 2019-06-21 PROCEDURE — 73590 X-RAY EXAM OF LOWER LEG: CPT

## 2019-06-21 PROCEDURE — 93306 TTE W/DOPPLER COMPLETE: CPT

## 2019-06-21 PROCEDURE — 99239 HOSP IP/OBS DSCHRG MGMT >30: CPT

## 2019-06-21 PROCEDURE — 87150 DNA/RNA AMPLIFIED PROBE: CPT

## 2019-06-21 PROCEDURE — 84165 PROTEIN E-PHORESIS SERUM: CPT

## 2019-06-21 PROCEDURE — 85730 THROMBOPLASTIN TIME PARTIAL: CPT

## 2019-06-21 PROCEDURE — 76775 US EXAM ABDO BACK WALL LIM: CPT

## 2019-06-21 PROCEDURE — 86334 IMMUNOFIX E-PHORESIS SERUM: CPT

## 2019-06-21 PROCEDURE — 85027 COMPLETE CBC AUTOMATED: CPT

## 2019-06-21 PROCEDURE — 84550 ASSAY OF BLOOD/URIC ACID: CPT

## 2019-06-21 PROCEDURE — 80048 BASIC METABOLIC PNL TOTAL CA: CPT

## 2019-06-21 PROCEDURE — 84155 ASSAY OF PROTEIN SERUM: CPT

## 2019-06-21 PROCEDURE — 74176 CT ABD & PELVIS W/O CONTRAST: CPT

## 2019-06-21 PROCEDURE — 80162 ASSAY OF DIGOXIN TOTAL: CPT

## 2019-06-21 PROCEDURE — 87086 URINE CULTURE/COLONY COUNT: CPT

## 2019-06-21 PROCEDURE — 99233 SBSQ HOSP IP/OBS HIGH 50: CPT

## 2019-06-21 PROCEDURE — 96374 THER/PROPH/DIAG INJ IV PUSH: CPT

## 2019-06-21 PROCEDURE — 83735 ASSAY OF MAGNESIUM: CPT

## 2019-06-21 PROCEDURE — 96361 HYDRATE IV INFUSION ADD-ON: CPT

## 2019-06-21 PROCEDURE — 93971 EXTREMITY STUDY: CPT

## 2019-06-21 PROCEDURE — 71045 X-RAY EXAM CHEST 1 VIEW: CPT

## 2019-06-21 PROCEDURE — 97116 GAIT TRAINING THERAPY: CPT

## 2019-06-21 PROCEDURE — 87184 SC STD DISK METHOD PER PLATE: CPT

## 2019-06-21 PROCEDURE — 80053 COMPREHEN METABOLIC PANEL: CPT

## 2019-06-21 PROCEDURE — 97161 PT EVAL LOW COMPLEX 20 MIN: CPT

## 2019-06-21 PROCEDURE — 81001 URINALYSIS AUTO W/SCOPE: CPT

## 2019-06-21 PROCEDURE — 93005 ELECTROCARDIOGRAM TRACING: CPT

## 2019-06-21 PROCEDURE — 87186 SC STD MICRODIL/AGAR DIL: CPT

## 2019-06-21 PROCEDURE — 84132 ASSAY OF SERUM POTASSIUM: CPT

## 2019-06-21 PROCEDURE — 84100 ASSAY OF PHOSPHORUS: CPT

## 2019-06-21 PROCEDURE — 71250 CT THORAX DX C-: CPT

## 2019-06-21 PROCEDURE — 87040 BLOOD CULTURE FOR BACTERIA: CPT

## 2019-06-21 PROCEDURE — 83605 ASSAY OF LACTIC ACID: CPT

## 2019-06-21 PROCEDURE — 85610 PROTHROMBIN TIME: CPT

## 2019-06-21 RX ORDER — WARFARIN SODIUM 2.5 MG/1
5 TABLET ORAL ONCE
Refills: 0 | Status: DISCONTINUED | OUTPATIENT
Start: 2019-06-21 | End: 2019-06-21

## 2019-06-21 RX ORDER — POTASSIUM CHLORIDE 20 MEQ
2 PACKET (EA) ORAL
Qty: 0 | Refills: 0 | DISCHARGE

## 2019-06-21 RX ORDER — MIRTAZAPINE 45 MG/1
1 TABLET, ORALLY DISINTEGRATING ORAL
Qty: 0 | Refills: 0 | DISCHARGE

## 2019-06-21 RX ORDER — CEFEPIME 1 G/1
2 INJECTION, POWDER, FOR SOLUTION INTRAMUSCULAR; INTRAVENOUS
Qty: 0 | Refills: 0 | DISCHARGE
Start: 2019-06-21

## 2019-06-21 RX ORDER — METOPROLOL TARTRATE 50 MG
0.5 TABLET ORAL
Qty: 15 | Refills: 0
Start: 2019-06-21 | End: 2019-07-20

## 2019-06-21 RX ORDER — ESCITALOPRAM OXALATE 10 MG/1
1 TABLET, FILM COATED ORAL
Qty: 0 | Refills: 0 | DISCHARGE

## 2019-06-21 RX ORDER — FUROSEMIDE 40 MG
1 TABLET ORAL
Qty: 30 | Refills: 0
Start: 2019-06-21 | End: 2019-07-20

## 2019-06-21 RX ORDER — WARFARIN SODIUM 2.5 MG/1
1 TABLET ORAL
Qty: 0 | Refills: 0 | DISCHARGE

## 2019-06-21 RX ADMIN — Medication 81 MILLIGRAM(S): at 11:23

## 2019-06-21 RX ADMIN — Medication 20 MILLIGRAM(S): at 05:23

## 2019-06-21 RX ADMIN — ESCITALOPRAM OXALATE 5 MILLIGRAM(S): 10 TABLET, FILM COATED ORAL at 11:23

## 2019-06-21 RX ADMIN — CEFEPIME 100 MILLIGRAM(S): 1 INJECTION, POWDER, FOR SOLUTION INTRAMUSCULAR; INTRAVENOUS at 11:23

## 2019-06-21 NOTE — PROGRESS NOTE ADULT - SUBJECTIVE AND OBJECTIVE BOX
Patient is a 89y old  Female who presents with a chief complaint of left lower extremity cellulitis (20 Jun 2019 19:16)      INTERVAL HPI/OVERNIGHT EVENTS:  resting comfortably      REVIEW OF SYSTEMS:  CONSTITUTIONAL: No fever, weight loss, or fatigue  EYES: No eye pain, visual disturbances, or discharge  ENMT:  No difficulty hearing, tinnitus, vertigo; No sinus or throat pain  NECK: No pain or stiffness  BREASTS: No pain, masses, or nipple discharge  RESPIRATORY: No cough, wheezing, chills or hemoptysis; No shortness of breath  CARDIOVASCULAR: No chest pain, palpitations, dizziness, or leg swelling  GASTROINTESTINAL: No abdominal or epigastric pain. No nausea, vomiting, or hematemesis; No diarrhea or constipation. No melena or hematochezia.  GENITOURINARY: No dysuria, frequency, hematuria, or incontinence  NEUROLOGICAL: No headaches, memory loss, loss of strength, numbness, or tremors  SKIN: No itching, burning, rashes, or lesions   LYMPH NODES: No enlarged glands  ENDOCRINE: No heat or cold intolerance; No hair loss  MUSCULOSKELETAL: No joint pain or swelling; No muscle, back, or extremity pain  PSYCHIATRIC: No depression, anxiety, mood swings, or difficulty sleeping  HEME/LYMPH: No easy bruising, or bleeding gums  ALLERY AND IMMUNOLOGIC: No hives or eczema  FAMILY HISTORY:  No significant family history    T(C): 36.3 (06-21-19 @ 05:23), Max: 36.4 (06-20-19 @ 15:19)  HR: 52 (06-21-19 @ 05:23) (52 - 74)  BP: 119/54 (06-21-19 @ 05:23) (99/54 - 119/54)  RR: 16 (06-21-19 @ 05:23) (16 - 17)  SpO2: 93% (06-21-19 @ 05:23) (87% - 95%)  Wt(kg): --Vital Signs Last 24 Hrs  T(C): 36.3 (21 Jun 2019 05:23), Max: 36.4 (20 Jun 2019 15:19)  T(F): 97.4 (21 Jun 2019 05:23), Max: 97.5 (20 Jun 2019 15:19)  HR: 52 (21 Jun 2019 05:23) (52 - 74)  BP: 119/54 (21 Jun 2019 05:23) (99/54 - 119/54)  BP(mean): --  RR: 16 (21 Jun 2019 05:23) (16 - 17)  SpO2: 93% (21 Jun 2019 05:23) (87% - 95%)    T(F): 97.4 (06-21-19 @ 05:23), Max: 97.7 (06-18-19 @ 16:00)  HR: 52 (06-21-19 @ 05:23) (44 - 100)  BP: 119/54 (06-21-19 @ 05:23) (96/49 - 121/60)  RR: 16 (06-21-19 @ 05:23) (16 - 17)  SpO2: 93% (06-21-19 @ 05:23) (87% - 100%)    PHYSICAL EXAM:  GENERAL: NAD, well-groomed, well-developed  HEAD:  Atraumatic, Normocephalic  EYES: EOMI, PERRLA, conjunctiva and sclera clear  ENMT: No tonsillar erythema, exudates, or enlargement; Moist mucous membranes, Good dentition, No lesions  NECK: Supple, No JVD, Normal thyroid  NERVOUS SYSTEM:  Alert & Oriented X3, Good concentration; Motor Strength 5/5 B/L upper and lower extremities; DTRs 2+ intact and symmetric  CHEST/LUNG: Clear to percussion bilaterally; No rales, rhonchi, wheezing, or rubs  HEART: Regular rate and rhythm; No murmurs, rubs, or gallops  ABDOMEN: Soft, Nontender, Nondistended; Bowel sounds present  EXTREMITIES:  2+ Peripheral Pulses, No clubbing, cyanosis, or edema  LYMPH: No lymphadenopathy noted  SKIN: No rashes or lesions    Consultant(s) Notes Reviewed:  [x ] YES  [ ] NO  Care Discussed with Consultants/Other Providers [ x] YES  [ ] NO    LABS:  06-21    142  |  107  |  36<H>  ----------------------------<  89  3.5   |  23  |  1.38<H>    Ca    9.2      21 Jun 2019 05:30  Phos  2.9     06-20  Mg     1.8     06-20                            11.3   7.12  )-----------( 242      ( 21 Jun 2019 05:30 )             37.0         PT/INR - ( 21 Jun 2019 06:05 )   PT: 24.0 sec;   INR: 2.09 ratio                              11.3   7.12  )-----------( 242      ( 06-21 @ 05:30 )             37.0                11.5   7.59  )-----------( 222      ( 06-20 @ 05:00 )             37.9                10.5   8.66  )-----------( 249      ( 06-18 @ 05:20 )             33.6                11.3   9.56  )-----------( 284      ( 06-15 @ 11:00 )             36.4                12.2   8.35  )-----------( 211      ( 06-14 @ 06:19 )             38.5                10.4   10.58 )-----------( 279      ( 06-13 @ 05:10 )             33.1                10.5   8.86  )-----------( 275      ( 06-12 @ 07:14 )             32.4                11.2   10.28 )-----------( 292      ( 06-11 @ 13:35 )             34.3                12.7   12.95 )-----------( 237      ( 06-09 @ 10:50 )             39.5                11.9   10.86 )-----------( 249      ( 06-08 @ 06:23 )             37.2                11.7   9.02  )-----------( 242      ( 06-06 @ 05:05 )             35.8                11.3   9.31  )-----------( 187      ( 06-05 @ 06:55 )             34.3                13.1   10.51 )-----------( 196      ( 06-04 @ 13:15 )             39.6               RADIOLOGY & ADDITIONAL TESTS:    Imaging Personally Reviewed:  [ ] YES  [ ] NO  acetaminophen   Tablet .. 650 milliGRAM(s) Oral every 6 hours PRN  aspirin enteric coated 81 milliGRAM(s) Oral daily  atorvastatin 80 milliGRAM(s) Oral at bedtime  cefepime   IVPB 2000 milliGRAM(s) IV Intermittent daily  escitalopram 5 milliGRAM(s) Oral daily  furosemide    Tablet 20 milliGRAM(s) Oral daily  mirtazapine 15 milliGRAM(s) Oral at bedtime  ondansetron Injectable 4 milliGRAM(s) IV Push every 12 hours PRN  sodium chloride 0.9% lock flush 10 milliLiter(s) IV Push every 1 hour PRN  warfarin 5 milliGRAM(s) Oral once      HEALTH ISSUES - PROBLEM Dx:  Bradycardia: Bradycardia  Sigmoid thickening: Sigmoid thickening  Bacteremia: Bacteremia  Hypokalemia: Hypokalemia  Essential hypertension: Essential hypertension  Anxiety: Anxiety  Atrial fibrillation, unspecified type: Atrial fibrillation, unspecified type  Aortic valve disease: Aortic valve disease  Skin ulcer, unspecified ulcer stage: Skin ulcer, unspecified ulcer stage  HENRY (acute kidney injury): HENRY (acute kidney injury)  Cellulitis of left leg: Cellulitis of left leg

## 2019-06-21 NOTE — DISCHARGE NOTE PROVIDER - NSDCCPCAREPLAN_GEN_ALL_CORE_FT
PRINCIPAL DISCHARGE DIAGNOSIS  Diagnosis: Cellulitis of left leg  Assessment and Plan of Treatment: Cont. IV Abx via PICC for 42 days total; today day 17/42      SECONDARY DISCHARGE DIAGNOSES  Diagnosis: Hypokalemia  Assessment and Plan of Treatment: resolved, weekly  cbc and cmp    Diagnosis: Acute renal failure, unspecified acute renal failure type  Assessment and Plan of Treatment: avoid nephrotoxic drugs

## 2019-06-21 NOTE — PROGRESS NOTE ADULT - REASON FOR ADMISSION
left lower extremity cellulitis

## 2019-06-21 NOTE — PROGRESS NOTE ADULT - PROBLEM SELECTOR PROBLEM 1
Bacteremia
Bradycardia
Cellulitis of left leg
Hypokalemia
Bradycardia

## 2019-06-21 NOTE — PROGRESS NOTE ADULT - PROVIDER SPECIALTY LIST ADULT
Cardiology
Critical Care
Infectious Disease
Internal Medicine
Nephrology
Cardiology
Cardiology
Nephrology
Cardiology
Infectious Disease
Cardiology
Internal Medicine

## 2019-06-21 NOTE — CHART NOTE - NSCHARTNOTEFT_GEN_A_CORE
Nutrition Follow Up Note  Hospital Course (Per Electronic Medical Record):   Source: Medical Record [X] Patient [X] Family [X] Nursing Staff [X]     Diet: Low Fiber, DASH/TLC , Ensure Enlive QD    Patient tolerating her diet , po varies from poor -good , (25-75%) as per flow sheet. No GI issues noted. Patient is noted with slight difficulty feeding herself  noted with private aide at bedside. Patient noted for possible discharge today pending equipment delivery to home.    Current Weight: (6/21) 151.8/68.9kg, stable weight , edema noted on (6/19)    Pertinent Medications: MEDICATIONS  (STANDING):  aspirin enteric coated 81 milliGRAM(s) Oral daily  atorvastatin 80 milliGRAM(s) Oral at bedtime  cefepime   IVPB 2000 milliGRAM(s) IV Intermittent daily  escitalopram 5 milliGRAM(s) Oral daily  furosemide    Tablet 20 milliGRAM(s) Oral daily  mirtazapine 15 milliGRAM(s) Oral at bedtime  warfarin 5 milliGRAM(s) Oral once    MEDICATIONS  (PRN):  acetaminophen   Tablet .. 650 milliGRAM(s) Oral every 6 hours PRN Temp greater or equal to 38C (100.4F), Mild Pain (1 - 3)  ondansetron Injectable 4 milliGRAM(s) IV Push every 12 hours PRN Nausea and/or Vomiting  sodium chloride 0.9% lock flush 10 milliLiter(s) IV Push every 1 hour PRN Pre/post blood products, medications, blood draw, and to maintain line patency      Pertinent Labs:  06-21 Na142 mmol/L Glu 89 mg/dL K+ 3.5 mmol/L Cr  1.38 mg/dL<H> BUN 36 mg/dL<H> 06-20 Phos 2.9 mg/dL        Skin: stage 1 sacrum.    Edema: (+1) (R) arm & (+2)(L) leg.    Last BM: on (6/21)     Estimated Needs:   [X] No Change since Previous Assessment  [X] Recalculated:     Previous Nutrition Diagnosis: Moderate Malnutrition    Nutrition Diagnosis is [X] Ongoing, receiving po nutrition supplement .       New Nutrition Diagnosis: [X] Not Applicable    [X] Mild Moderate Severe Malnutrition      Interventions:   1. Recommend continue current diet regimen..     Monitoring & Evaluation: will monitor:  [X] Weights   [X] PO Intake   [X] Follow Up (Per Protocol)  [X] Tolerance to Diet Prescription       RD to follow as per Nutrition protocol  Mary Ann Smith RDN

## 2019-06-21 NOTE — PROGRESS NOTE ADULT - ASSESSMENT
Patient with advanced heart disease, AVR, mitral clip, bacteremia and persume IE, chronic chf and af.  Would continue low dose b blocker for af.  Consider diuretic for her chronic chf.  Oral a/c with opd monitoring.  AB per ID

## 2019-06-21 NOTE — PROGRESS NOTE ADULT - SUBJECTIVE AND OBJECTIVE BOX
CC: f/u for polymicrobic bacteremia    Patient reports: she is comfortable, on nasal oxygen in bed.Discharge is planned for today.    REVIEW OF SYSTEMS:  All other review of systems negative (Comprehensive ROS)    Antimicrobials Day #  :day 17/42  cefepime   IVPB 2000 milliGRAM(s) IV Intermittent daily    Other Medications Reviewed    T(F): 97.4 (06-21-19 @ 10:29), Max: 97.5 (06-20-19 @ 20:15)  HR: 81 (06-21-19 @ 10:29)  BP: 95/54 (06-21-19 @ 10:29)  RR: 17 (06-21-19 @ 10:29)  SpO2: 93% (06-21-19 @ 10:29)  Wt(kg): --    PHYSICAL EXAM:  General: alert, no acute distress  Eyes:  anicteric, no conjunctival injection, no discharge  Oropharynx: no lesions or injection 	  Neck: supple, without adenopathy  Lungs: diminished at bases  Heart: irregular rate and rhythm;   Abdomen: soft, nondistended, nontender, without mass or organomegaly  Skin: no lesions  Extremities: no clubbing, cyanosis, + edema  Neurologic: alert, oriented, moves all extremities    LAB RESULTS:                        11.3   7.12  )-----------( 242      ( 21 Jun 2019 05:30 )             37.0     06-21    142  |  107  |  36<H>  ----------------------------<  89  3.5   |  23  |  1.38<H>    Ca    9.2      21 Jun 2019 05:30  Phos  2.9     06-20  Mg     1.8     06-20          MICROBIOLOGY:  RECENT CULTURES:      RADIOLOGY REVIEWED:    < from: Xray Chest 1 View- PORTABLE-Routine (06.20.19 @ 10:21) >  EXAM:  XR CHEST PORTABLE ROUTINE 1V      PROCEDURE DATE:  06/20/2019        INTERPRETATION:  INDICATION: evaluate CHF    PRIORS: 6/16/2019    VIEWS: Portable AP radiography of the chest performed.    FINDINGS: Since prior evaluation no significant interval change is noted.   The position of the right PICC line is stable. Note is made of a valvular   prosthetic as well as a mitral valve clip. Airspace opacities are   redemonstrated within the bilateral lower lung fields with bilateral   pleural effusions. There is no evidence for pneumothorax. No mediastinal   shift is noted. Degenerative change of the right shoulder region noted.    IMPRESSION: No significant interval change.    < end of copied text >

## 2019-06-21 NOTE — PROGRESS NOTE ADULT - WEIGHT IN LBS
141.9
141.9
142.4
141.9
142.4
141.9
141.9
142.4
141.9
142.4

## 2019-06-21 NOTE — PROGRESS NOTE ADULT - PROBLEM SELECTOR PLAN 2
Area of thickening in the sigmoid inflammatory versus malignant conservative measures
Conservative management patient aware that the sigmoid colon could represent malignancy versus inflammation she does not want to be worked up for this and certainly would not agree to any surgical intervention
improved
improved
Antibiotics as per infectious disease
Continue IV antibiotics as per infectious disease
Markedly improved
Much improved
Supplemental hold furosemide hold digoxin telemetry observation
resolved
continue MARIAJOSE

## 2019-06-21 NOTE — PROGRESS NOTE ADULT - ASSESSMENT
Last pulse this morning 52 beta blockers and digoxin have been held continue to monitor decide if further treatment is necessary patient's situation is clinically deteriorating Will discuss hospice evaluation

## 2019-06-21 NOTE — PROGRESS NOTE ADULT - ASSESSMENT
Physical Examination:  GENERAL:               Alert, Oriented, No acute distress.    HEENT:                    No JVD, Dry MM  PULM:                     Bilateral air entry, trace Rales, No Rhonchi, No Wheezing  CVS:                         S1, S2,  6/6 + Murmur  ABD:                        Soft, nondistended, nontender, normoactive bowel sounds,   NEURO:                  Alert, oriented, interactive, nonfocal, follows commands  PSYC:                      Calm, + Insight.        Assessment  Atrial fib with slow Ventricular response Improved  Cellulitis improving/resolved with Pseudomonas Bacteremia with presumed endocarditis on abx as per ID repeat cultures negative  Abnormal Echo: with Grade 3 Diastolic CHF, Severe RV enlargement TR regurgitation, S/p AVR, Mod interatrial left to right shunt - Patent Foramen ovale   Abnormal CT: Sigmoid thickening: Sigmoid thickening  Chronic Diastolic CHF, VHD s/p AVR Mechanical s/p MV Clip, MGUS, GERD    Plan  afib management as per Cardio  dose coumadin  keep INR 2-3  Abnormal CT Abd findings - as per Primary team. if candidate for Colonoscopy   Continue care on medical floor   no active CCM issues reconsult as needed  case d/w Dr. Leon   Goals of Care: Full code

## 2019-06-21 NOTE — PROGRESS NOTE ADULT - ASSESSMENT
89 yo F,  AVR, MV clip, CHF on coumadin, remote ORIF, L leg erythema, covered for cellulitis with Vanco  found to have Strep salivarius bacteremia  Chronic findings on ECHO, but with concern for endocarditis in this setting  then pseudomonas bacteremia  endovascular infection of concern.   bacteremias now controlled and cellulitis has resolved  No fever and no leukocytosis  Anticoagulation per medicine  Trial of void with history of elevated PVR per medicine  CKD : creat is stable  S/P PICC line in right arm.  Now being managed for bradycardia  Plan:  day17/42 of Cefepime via PICC  Supportive care  Weekly CBC,Diff and CMP when discharged  ID issues reviewed with children, ID fu in 1- 2 weeks advised  Condition guarded, she will be at high risk for readmission when discharged

## 2019-06-21 NOTE — PROGRESS NOTE ADULT - SUBJECTIVE AND OBJECTIVE BOX
Follow-up Critical Care Progress Note  Chief Complaint : Cellulitis of left lower extremity      hr stable, less bradycardic from yesterday  feels well today  no cp, sob, palp, n/v  seen by cardio this am.        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 650 milliGRAM(s) Oral every 6 hours PRN Temp greater or equal to 38C (100.4F), Mild Pain (1 - 3)  ondansetron Injectable 4 milliGRAM(s) IV Push every 12 hours PRN Nausea and/or Vomiting  sodium chloride 0.9% lock flush 10 milliLiter(s) IV Push every 1 hour PRN Pre/post blood products, medications, blood draw, and to maintain line patency      LABS:                        11.3   7.12  )-----------( 242      ( 21 Jun 2019 05:30 )             37.0     06-21    142  |  107  |  36<H>  ----------------------------<  89  3.5   |  23  |  1.38<H>    Ca    9.2      21 Jun 2019 05:30  Phos  2.9     06-20  Mg     1.8     06-20              PT/INR - ( 21 Jun 2019 06:05 )   PT: 24.0 sec;   INR: 2.09 ratio                     CULTURES: (if applicable)    Culture - Blood (collected 06-13-19 @ 09:45)  Source: .Blood Blood-Venous  Final Report (06-18-19 @ 14:00):    No growth at 5 days.    Culture - Blood (collected 06-09-19 @ 20:35)  Source: .Blood Blood  Final Report (06-15-19 @ 01:01):    No growth at 5 days.    Culture - Blood (collected 06-09-19 @ 20:35)  Source: .Blood Blood  Final Report (06-15-19 @ 01:01):    No growth at 5 days.    Culture - Blood (collected 06-08-19 @ 06:23)  Source: .Blood Blood  Gram Stain (06-09-19 @ 02:56):    Growth in aerobic bottle: Gram Negative Rods  Final Report (06-10-19 @ 17:41):    Growth in aerobic bottle: Pseudomonas aeruginosa    "Due to technical problems, Proteus sp. will Not be reported as part of    the BCID panel until further notice"    ***Blood Panel PCR results on this specimen are available    approximately 3 hours after the Gram stain result.***    Gram stain, PCR, and/or culture results may not always    correspond due to difference in methodologies.    ************************************************************    This PCR assay was performed using [employer].    Thefollowing targets are tested for: Enterococcus,    vancomycin resistant enterococci, Listeria monocytogenes,    coagulase negative staphylococci, S. aureus,    methicillin resistant S. aureus, Streptococcus agalactiae    (Group B), S. pneumoniae, S. pyogenes(Group A),    Acinetobacter baumannii, Enterobacter cloacae, E. coli,    Klebsiella oxytoca, K. pneumoniae, Proteus sp.,    Serratia marcescens, Haemophilus influenzae,    Neisseria meningitidis, Pseudomonas aeruginosa, Candida    albicans, C. glabrata, C krusei, C parapsilosis,    C. tropicalis and the KPC resistance gene.  Organism: Blood Culture PCR  Pseudomonas aeruginosa (06-10-19 @ 17:41)  Organism: Pseudomonas aeruginosa (06-10-19 @ 17:41)      -  Amikacin: S <=8      -  Aztreonam: S <=4      -  Cefepime: S <=2      -  Ceftazidime: S 4      -  Ciprofloxacin: S <=0.5      -  Gentamicin: S 4      -  Imipenem: S <=1      -  Levofloxacin: S <=1      -  Meropenem: S <=1      -  Piperacillin/Tazobactam: S <=8      -  Tobramycin: S <=2      Method Type: MIRIAM  Organism: Blood Culture PCR (06-10-19 @ 17:41)      -  Pseudomonas aeruginosa: Detec      Method Type: PCR    Culture - Blood (collected 06-08-19 @ 06:23)  Source: .Blood Blood  Gram Stain (06-09-19 @ 03:59):    Growth in aerobic bottle: Gram Negative Rods  Final Report (06-10-19 @ 17:44):    Growth in aerobic bottle: Pseudomonas aeruginosa    See previous culture [id number]            VITALS:  T(C): 36.3 (06-21-19 @ 10:29), Max: 36.4 (06-20-19 @ 15:19)  T(F): 97.4 (06-21-19 @ 10:29), Max: 97.5 (06-20-19 @ 15:19)  HR: 81 (06-21-19 @ 10:29) (52 - 81)  BP: 95/54 (06-21-19 @ 10:29) (95/54 - 119/54)  BP(mean): --  ABP: --  ABP(mean): --  RR: 17 (06-21-19 @ 10:29) (16 - 17)  SpO2: 93% (06-21-19 @ 10:29) (87% - 95%)  CVP(mm Hg): --  CVP(cm H2O): --    Ins and Outs     06-20-19 @ 07:01  -  06-21-19 @ 07:00  --------------------------------------------------------  IN: 420 mL / OUT: 100 mL / NET: 320 mL    06-21-19 @ 07:01  -  06-21-19 @ 12:37  --------------------------------------------------------  IN: 250 mL / OUT: 0 mL / NET: 250 mL        Height (cm): 172.72 (06-21-19 @ 09:01)  Weight (kg): 64.6 (06-21-19 @ 09:01)  BMI (kg/m2): 21.7 (06-21-19 @ 09:01)        I&O's Detail    20 Jun 2019 07:01  -  21 Jun 2019 07:00  --------------------------------------------------------  IN:    Oral Fluid: 420 mL  Total IN: 420 mL    OUT:    Voided: 100 mL  Total OUT: 100 mL    Total NET: 320 mL      21 Jun 2019 07:01  -  21 Jun 2019 12:37  --------------------------------------------------------  IN:    Oral Fluid: 250 mL  Total IN: 250 mL    OUT:  Total OUT: 0 mL    Total NET: 250 mL

## 2019-06-21 NOTE — PROGRESS NOTE ADULT - SUBJECTIVE AND OBJECTIVE BOX
MELLY VELASQUEZ  89y  Female    Patient is a 89y old  Female who presents with a chief complaint of left lower extremity cellulitis (21 Jun 2019 09:01)         out of bed to chair  denies any chest pain or shortness of breath.      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  H/O aortic valve replacement: 2000-mechanical valve-on Coumadin          PHYSICAL EXAM:    T(C): 36.3 (06-21-19 @ 10:29), Max: 36.4 (06-20-19 @ 15:19)  HR: 81 (06-21-19 @ 10:29) (52 - 81)  BP: 95/54 (06-21-19 @ 10:29) (95/54 - 119/54)  RR: 17 (06-21-19 @ 10:29) (16 - 17)  SpO2: 93% (06-21-19 @ 10:29) (87% - 95%)  Wt(kg): --    I&O's Detail    20 Jun 2019 07:01  -  21 Jun 2019 07:00  --------------------------------------------------------  IN:    Oral Fluid: 420 mL  Total IN: 420 mL    OUT:    Voided: 100 mL  Total OUT: 100 mL    Total NET: 320 mL      21 Jun 2019 07:01  -  21 Jun 2019 10:37  --------------------------------------------------------  IN:    Oral Fluid: 250 mL  Total IN: 250 mL    OUT:  Total OUT: 0 mL    Total NET: 250 mL          Respiratory: clear anteriorly, decreased BS at bases  Cardiovascular: S1 S2  Gastrointestinal: soft NT ND +BS  Extremities: trace edema   Neuro: Awake and alert    MEDICATIONS  (STANDING):  aspirin enteric coated 81 milliGRAM(s) Oral daily  atorvastatin 80 milliGRAM(s) Oral at bedtime  cefepime   IVPB 2000 milliGRAM(s) IV Intermittent daily  escitalopram 5 milliGRAM(s) Oral daily  furosemide    Tablet 20 milliGRAM(s) Oral daily  mirtazapine 15 milliGRAM(s) Oral at bedtime  warfarin 5 milliGRAM(s) Oral once    MEDICATIONS  (PRN):  acetaminophen   Tablet .. 650 milliGRAM(s) Oral every 6 hours PRN Temp greater or equal to 38C (100.4F), Mild Pain (1 - 3)  ondansetron Injectable 4 milliGRAM(s) IV Push every 12 hours PRN Nausea and/or Vomiting  sodium chloride 0.9% lock flush 10 milliLiter(s) IV Push every 1 hour PRN Pre/post blood products, medications, blood draw, and to maintain line patency                            11.3   7.12  )-----------( 242      ( 21 Jun 2019 05:30 )             37.0       06-21    142  |  107  |  36<H>  ----------------------------<  89  3.5   |  23  |  1.38<H>    Ca    9.2      21 Jun 2019 05:30  Phos  2.9     06-20  Mg     1.8     06-20

## 2019-06-21 NOTE — PROGRESS NOTE ADULT - SUBJECTIVE AND OBJECTIVE BOX
Follow up for    bacteremia, IE, prosthetic aortic valve, af, chronic chf.    SUBJ:   No chest pain, dyspnea.  On less meds related to asymptomatic bradycardia.    PMH  Aortic valve disease  Non-rheumatic mitral regurgitation  HLD (hyperlipidemia)  Melanoma  Diverticulosis of small intestine without hemorrhage  Afib  Anxiety  Essential hypertension      MEDICATIONS  (STANDING):  aspirin enteric coated 81 milliGRAM(s) Oral daily  atorvastatin 80 milliGRAM(s) Oral at bedtime  cefepime   IVPB 2000 milliGRAM(s) IV Intermittent daily  escitalopram 5 milliGRAM(s) Oral daily  furosemide    Tablet 20 milliGRAM(s) Oral daily  mirtazapine 15 milliGRAM(s) Oral at bedtime  warfarin 5 milliGRAM(s) Oral once    MEDICATIONS  (PRN):  acetaminophen   Tablet .. 650 milliGRAM(s) Oral every 6 hours PRN Temp greater or equal to 38C (100.4F), Mild Pain (1 - 3)  ondansetron Injectable 4 milliGRAM(s) IV Push every 12 hours PRN Nausea and/or Vomiting  sodium chloride 0.9% lock flush 10 milliLiter(s) IV Push every 1 hour PRN Pre/post blood products, medications, blood draw, and to maintain line patency        PHYSICAL EXAM:  Vital Signs Last 24 Hrs  T(C): 36.3 (21 Jun 2019 10:29), Max: 36.4 (20 Jun 2019 15:19)  T(F): 97.4 (21 Jun 2019 10:29), Max: 97.5 (20 Jun 2019 15:19)  HR: 81 (21 Jun 2019 10:29) (52 - 81)  BP: 95/54 (21 Jun 2019 10:29) (95/54 - 119/54)  BP(mean): --  RR: 17 (21 Jun 2019 10:29) (16 - 17)  SpO2: 93% (21 Jun 2019 10:29) (87% - 95%)    GENERAL: NAD, well-groomed, well-developed  HEAD:  Atraumatic, Normocephalic  EYES: EOMI, PERRLA, conjunctiva and sclera clear  ENT: Moist mucous membranes,  NECK: Supple, No JVD, no bruits  CHEST/LUNG: Clear to percussion and auscultation bilaterally; No rales, rhonchi, wheezing, or rubs  HEART: Good S2  ABDOMEN: Soft, Nontender, Nondistended; Bowel sounds present  EXTREMITIES:  2+ Peripheral Pulses,  mild chronic edema lle  SKIN: No rashes or lesions  NERVOUS SYSTEM:  Alert & Oriented X3      TELEMETRY: AF with some bradycardia    ECG:    LABS:                        11.3   7.12  )-----------( 242      ( 21 Jun 2019 05:30 )             37.0     06-21    142  |  107  |  36<H>  ----------------------------<  89  3.5   |  23  |  1.38<H>    Ca    9.2      21 Jun 2019 05:30  Phos  2.9     06-20  Mg     1.8     06-20          PT/INR - ( 21 Jun 2019 06:05 )   PT: 24.0 sec;   INR: 2.09 ratio             I&O's Summary    20 Jun 2019 07:01  -  21 Jun 2019 07:00  --------------------------------------------------------  IN: 420 mL / OUT: 100 mL / NET: 320 mL    21 Jun 2019 07:01  -  21 Jun 2019 11:14  --------------------------------------------------------  IN: 250 mL / OUT: 0 mL / NET: 250 mL          RADIOLOGY & ADDITIONAL STUDIES:    < from: Xray Chest 1 View- PORTABLE-Routine (06.20.19 @ 10:21) >    EXAM:  XR CHEST PORTABLE ROUTINE 1V      PROCEDURE DATE:  06/20/2019        INTERPRETATION:  INDICATION: evaluate CHF    PRIORS: 6/16/2019    VIEWS: Portable AP radiography of the chest performed.    FINDINGS: Since prior evaluation no significant interval change is noted.   The position of the right PICC line is stable. Note is made of a valvular   prosthetic as well as a mitral valve clip. Airspace opacities are   redemonstrated within the bilateral lower lung fields with bilateral   pleural effusions. There is no evidence for pneumothorax. No mediastinal   shift is noted. Degenerative change of the right shoulder region noted.    IMPRESSION: No significant interval change.    < end of copied text >

## 2019-06-21 NOTE — PROGRESS NOTE ADULT - WEIGHT IN KG
64.4
64.4
64.6
64.4
64.6
64.4
64.4
64.6
64.4
64.6

## 2019-06-21 NOTE — DISCHARGE NOTE PROVIDER - HOSPITAL COURSE
89 y/o female with advanced heart disease, AVR, mitral clip, bacteremia and persumed IE, chronic chf and af.            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 needs weekly INR on Tuesdays - will have blood drawn at home          #Severe pulmonary hypertension 2/2 valvular disease    Pt. needs Home O2 - 3L via [place]    O2 will be delivered at home        #CKD 3:    Avoid nephrotoxic drugs    Monitor BMP        #Pt debilitated - at risk for skin breakdown - needs special bed and commode    - equipment was delivered at home        Dispo: discharge home with home care today

## 2019-06-21 NOTE — DISCHARGE NOTE PROVIDER - CARE PROVIDER_API CALL
Albino Iqbal)  Gastroenterology; Internal Medicine  [address] [place] [address], [address]  [place], [place] [id number]  Phone: [phone]  Fax: [phone]  Follow Up Time:

## 2019-06-21 NOTE — DISCHARGE NOTE NURSING/CASE MANAGEMENT/SOCIAL WORK - NSDCDPATPORTLINK_GEN_ALL_CORE
You can access the [facility] Portal, offered by [facility], by registering with the following website: [email or web address]

## 2019-06-21 NOTE — PROGRESS NOTE ADULT - PROBLEM SELECTOR PLAN 1
Eventual PICC line and IV antibiotics as per infectious disease to complete a six-week course
ID note note
Include 6 weeks total course of antibiotics at home intravenously supportive care 24 hour home assistance arrange home O2 and home physical therapy as per patient's request
Will need to 6 weeks course of antibiotics per infectious disease this is a PICC line and home antibiotics if arrangements are possible
yasir per ID
Antibiotics per infectious disease we'll order wound care with Dr. Parish
Continue IV antibiotics follow INR ventral one and home antibiotics
Continue IV antibiotics per infectious disease CT of the abdomen and pelvis
Continue IV antibiotics per infectious disease await isolation and sensitivities
Continue aggressive supplementation and monitoring discuss Lasix and digoxin with cardiology
Infectious disease followup appreciated to complete 6 weeks of IV antibiotics PICC line in place arrangements being made for discharge home
Monitor and discuss with cardiology
cultures remain positive  ID to f/u
picc placed   complete home antibiotics
hold metoprolol and digoxin

## 2019-06-21 NOTE — PROGRESS NOTE ADULT - ASSESSMENT
89W PMH atrial fibrillation, chronic diastolic heart failue, GERD, s/p mechanical valve replacement, MGUS, anxiety presents for left lower extremity cellulitis.   Renal indices are stable and are close to baseline. Continue supportive care.

## 2019-06-21 NOTE — PROGRESS NOTE ADULT - NSHPATTENDINGPLANDISCUSS_GEN_ALL_CORE
patient and son, Jeremiah Coreas NP
dr bob canseco
dr caal
patient and friend at bedside
Dr. Ball, patient and STEFANIA Robles PA

## 2019-06-21 NOTE — PROGRESS NOTE ADULT - PROBLEM SELECTOR PROBLEM 2
Cellulitis of left leg
Cellulitis of left leg
Sigmoid thickening
Sigmoid thickening
Bacteremia
Cellulitis of left leg
Hypokalemia
Bacteremia

## 2019-06-21 NOTE — PROGRESS NOTE ADULT - HEIGHT IN CM
172.72

## 2019-07-05 DIAGNOSIS — R45.1 RESTLESSNESS AND AGITATION: ICD-10-CM

## 2019-07-05 RX ORDER — LORAZEPAM 1 MG/1
1 TABLET ORAL
Qty: 60 | Refills: 0 | Status: ACTIVE | COMMUNITY
Start: 2019-07-05 | End: 1900-01-01

## 2020-11-04 NOTE — ASSESSMENT
[FreeTextEntry1] : 89-year-old woman with advanced valvular heart disease, history of mechanical aVR, mitral insufficiency and mitral clip, residual MR /MS, severe pulmonary hypertension. Atrial fibrillation. Weight loss. Possible cardiac etiology. Compensated CHF.\par \par Patient on chronic anticoagulation
no

## 2020-12-16 PROBLEM — Z87.09 HISTORY OF INFLUENZA: Status: RESOLVED | Noted: 2018-12-07 | Resolved: 2020-12-16

## 2020-12-16 PROBLEM — Z87.09 HISTORY OF UPPER RESPIRATORY INFECTION: Status: RESOLVED | Noted: 2018-03-06 | Resolved: 2020-12-16

## 2021-08-26 NOTE — REVIEW OF SYSTEMS
yes [Fever] : no fever [Recent Weight Gain (___ Lbs)] : no recent weight gain [Chills] : no chills [Feeling Fatigued] : not feeling fatigued [Recent Weight Loss (___ Lbs)] : recent [unfilled] ~Ulb weight loss [Eyeglasses] : currently wearing eyeglasses [Loss Of Hearing] : hearing loss [Dyspnea on exertion] : not dyspnea during exertion [Chest Pain] : no chest pain [Lower Ext Edema] : no extremity edema [Palpitations] : no palpitations [Abdominal Pain] : no abdominal pain [Heartburn] : no heartburn [Change in Appetite] : no change in appetite [Joint Pain] : joint pain [see HPI] : see HPI [Skin Lesions] : skin lesion(s): [Dizziness] : dizziness [Anxiety] : anxiety [Easy Bleeding] : a tendency for easy bleeding [Easy Bruising] : a tendency for easy bruising [Negative] : Heme/Lymph

## 2021-08-30 NOTE — PROGRESS NOTE ADULT - RESPIRATORY AND THORAX
negative Detail Level: Detailed Quality 226: Preventive Care And Screening: Tobacco Use: Screening And Cessation Intervention: Patient screened for tobacco use and is an ex/non-smoker Quality 130: Documentation Of Current Medications In The Medical Record: Current Medications Documented

## 2022-01-08 NOTE — ED ADULT NURSE NOTE - NS ED PATIENT SAFETY CONCERN
This is a chronic stable condition  Currently not on any medication  Follow up with PCP on out patient basis  No

## 2022-07-14 NOTE — ED ADULT NURSE NOTE - NS ED NURSE RECORD ANOTHER HT AND WT
Yes Tazorac Counseling:  Patient advised that medication is irritating and drying.  Patient may need to apply sparingly and wash off after an hour before eventually leaving it on overnight.  The patient verbalized understanding of the proper use and possible adverse effects of tazorac.  All of the patient's questions and concerns were addressed.

## 2022-09-08 NOTE — CURRENT MEDS
[Takes medication as prescribed] : takes [None] : Patient does not have any barriers to medication adherence Private car

## 2023-10-01 PROBLEM — K86.89 PANCREATIC MASS: Status: ACTIVE | Noted: 2017-09-13
